# Patient Record
Sex: FEMALE | Race: WHITE | Employment: UNEMPLOYED | URBAN - METROPOLITAN AREA
[De-identification: names, ages, dates, MRNs, and addresses within clinical notes are randomized per-mention and may not be internally consistent; named-entity substitution may affect disease eponyms.]

---

## 2017-04-05 ENCOUNTER — HOSPITAL ENCOUNTER (OUTPATIENT)
Dept: PREADMISSION TESTING | Age: 82
Discharge: HOME | End: 2017-04-05
Attending: OBSTETRICS & GYNECOLOGY | Admitting: OBSTETRICS & GYNECOLOGY

## 2017-04-05 VITALS — HEIGHT: 67 IN | BODY MASS INDEX: 21.4 KG/M2 | WEIGHT: 136.38 LBS

## 2017-04-05 LAB
A/G RATIO: 1.6 (ref 1.1–2.2)
ALBUMIN SERPL-MCNC: 4.1 G/DL (ref 3.4–5)
ALP BLD-CCNC: 59 U/L (ref 40–129)
ALT SERPL-CCNC: 13 U/L (ref 10–40)
ANION GAP SERPL CALCULATED.3IONS-SCNC: 10 MMOL/L (ref 3–16)
AST SERPL-CCNC: 10 U/L (ref 15–37)
BASOPHILS ABSOLUTE: 0 K/UL (ref 0–0.2)
BASOPHILS RELATIVE PERCENT: 0.6 %
BILIRUB SERPL-MCNC: 0.3 MG/DL (ref 0–1)
BUN BLDV-MCNC: 31 MG/DL (ref 7–20)
CALCIUM SERPL-MCNC: 9.4 MG/DL (ref 8.3–10.6)
CHLORIDE BLD-SCNC: 103 MMOL/L (ref 99–110)
CO2: 27 MMOL/L (ref 21–32)
CREAT SERPL-MCNC: <0.5 MG/DL (ref 0.6–1.2)
EOSINOPHILS ABSOLUTE: 0.2 K/UL (ref 0–0.6)
EOSINOPHILS RELATIVE PERCENT: 3.1 %
GFR AFRICAN AMERICAN: >60
GFR NON-AFRICAN AMERICAN: >60
GLOBULIN: 2.6 G/DL
GLUCOSE BLD-MCNC: 94 MG/DL (ref 70–99)
HCT VFR BLD CALC: 44.3 % (ref 36–48)
HEMOGLOBIN: 14.5 G/DL (ref 12–16)
LYMPHOCYTES ABSOLUTE: 1.6 K/UL (ref 1–5.1)
LYMPHOCYTES RELATIVE PERCENT: 30.5 %
MCH RBC QN AUTO: 28.6 PG (ref 26–34)
MCHC RBC AUTO-ENTMCNC: 32.8 G/DL (ref 31–36)
MCV RBC AUTO: 87.2 FL (ref 80–100)
MONOCYTES ABSOLUTE: 0.4 K/UL (ref 0–1.3)
MONOCYTES RELATIVE PERCENT: 7.2 %
NEUTROPHILS ABSOLUTE: 3 K/UL (ref 1.7–7.7)
NEUTROPHILS RELATIVE PERCENT: 58.6 %
PDW BLD-RTO: 15.4 % (ref 12.4–15.4)
PLATELET # BLD: 149 K/UL (ref 135–450)
PMV BLD AUTO: 9.1 FL (ref 5–10.5)
POTASSIUM SERPL-SCNC: 4.7 MMOL/L (ref 3.5–5.1)
RBC # BLD: 5.07 M/UL (ref 4–5.2)
SODIUM BLD-SCNC: 140 MMOL/L (ref 136–145)
TOTAL PROTEIN: 6.7 G/DL (ref 6.4–8.2)
WBC # BLD: 5.1 K/UL (ref 4–11)

## 2017-04-05 RX ORDER — SIMVASTATIN 20 MG
20 TABLET ORAL NIGHTLY
COMMUNITY
End: 2018-02-23

## 2017-04-05 RX ORDER — SODIUM CHLORIDE, SODIUM LACTATE, POTASSIUM CHLORIDE, CALCIUM CHLORIDE 600; 310; 30; 20 MG/100ML; MG/100ML; MG/100ML; MG/100ML
INJECTION, SOLUTION INTRAVENOUS CONTINUOUS
Status: CANCELLED | OUTPATIENT
Start: 2017-04-06

## 2017-04-05 RX ORDER — TRAMADOL HYDROCHLORIDE 50 MG/1
50 TABLET ORAL EVERY 6 HOURS PRN
COMMUNITY
End: 2017-04-10 | Stop reason: CLARIF

## 2017-04-05 RX ORDER — LIDOCAINE HYDROCHLORIDE 10 MG/ML
0.5 INJECTION, SOLUTION EPIDURAL; INFILTRATION; INTRACAUDAL; PERINEURAL ONCE
Status: CANCELLED | OUTPATIENT
Start: 2017-04-06

## 2017-04-06 ENCOUNTER — HOSPITAL ENCOUNTER (OUTPATIENT)
Dept: SURGERY | Age: 82
Discharge: HOME | End: 2017-04-06
Attending: OBSTETRICS & GYNECOLOGY | Admitting: OBSTETRICS & GYNECOLOGY

## 2017-04-06 VITALS
HEIGHT: 67 IN | BODY MASS INDEX: 21.16 KG/M2 | RESPIRATION RATE: 16 BRPM | SYSTOLIC BLOOD PRESSURE: 147 MMHG | TEMPERATURE: 98.2 F | WEIGHT: 134.8 LBS | OXYGEN SATURATION: 96 % | DIASTOLIC BLOOD PRESSURE: 82 MMHG | HEART RATE: 70 BPM

## 2017-04-06 LAB
EKG ATRIAL RATE: 55 BPM
EKG DIAGNOSIS: NORMAL
EKG P AXIS: 25 DEGREES
EKG P-R INTERVAL: 204 MS
EKG Q-T INTERVAL: 438 MS
EKG QRS DURATION: 98 MS
EKG QTC CALCULATION (BAZETT): 419 MS
EKG R AXIS: 78 DEGREES
EKG T AXIS: 49 DEGREES
EKG VENTRICULAR RATE: 55 BPM
GLUCOSE BLD-MCNC: 90 MG/DL (ref 70–99)
PERFORMED ON: NORMAL
POTASSIUM SERPL-SCNC: 3.7 MMOL/L (ref 3.5–5.1)

## 2017-04-06 PROCEDURE — 93010 ELECTROCARDIOGRAM REPORT: CPT | Performed by: INTERNAL MEDICINE

## 2017-04-06 RX ORDER — LIDOCAINE HYDROCHLORIDE 10 MG/ML
1 INJECTION, SOLUTION EPIDURAL; INFILTRATION; INTRACAUDAL; PERINEURAL
Status: ACTIVE | OUTPATIENT
Start: 2017-04-06 | End: 2017-04-06

## 2017-04-06 RX ORDER — MULTIVIT-MIN/IRON/FOLIC ACID/K 18-600-40
2000 CAPSULE ORAL EVERY OTHER DAY
COMMUNITY
End: 2018-06-19

## 2017-04-06 RX ORDER — FENTANYL CITRATE 50 UG/ML
25 INJECTION, SOLUTION INTRAMUSCULAR; INTRAVENOUS EVERY 5 MIN PRN
Status: DISCONTINUED | OUTPATIENT
Start: 2017-04-06 | End: 2017-04-07 | Stop reason: HOSPADM

## 2017-04-06 RX ORDER — TRAMADOL HYDROCHLORIDE 50 MG/1
25 TABLET ORAL ONCE
Status: COMPLETED | OUTPATIENT
Start: 2017-04-06 | End: 2017-04-06

## 2017-04-06 RX ORDER — DOXYCYCLINE HYCLATE 100 MG/1
100 CAPSULE ORAL 2 TIMES DAILY
COMMUNITY
End: 2017-04-26

## 2017-04-06 RX ORDER — TRAMADOL HYDROCHLORIDE 50 MG/1
TABLET ORAL
Status: COMPLETED
Start: 2017-04-06 | End: 2017-04-06

## 2017-04-06 RX ORDER — HYDROMORPHONE HCL 110MG/55ML
0.5 PATIENT CONTROLLED ANALGESIA SYRINGE INTRAVENOUS EVERY 5 MIN PRN
Status: DISCONTINUED | OUTPATIENT
Start: 2017-04-06 | End: 2017-04-06

## 2017-04-06 RX ORDER — HYDROMORPHONE HCL 110MG/55ML
0.25 PATIENT CONTROLLED ANALGESIA SYRINGE INTRAVENOUS EVERY 5 MIN PRN
Status: DISCONTINUED | OUTPATIENT
Start: 2017-04-06 | End: 2017-04-06

## 2017-04-06 RX ORDER — GENTAMICIN SULFATE 80 MG/100ML
INJECTION, SOLUTION INTRAVENOUS
Status: COMPLETED
Start: 2017-04-06 | End: 2017-04-06

## 2017-04-06 RX ORDER — PHENAZOPYRIDINE HYDROCHLORIDE 100 MG/1
200 TABLET, FILM COATED ORAL
Status: DISCONTINUED | OUTPATIENT
Start: 2017-04-06 | End: 2017-04-07 | Stop reason: HOSPADM

## 2017-04-06 RX ORDER — MEPERIDINE HYDROCHLORIDE 25 MG/ML
12.5 INJECTION INTRAMUSCULAR; INTRAVENOUS; SUBCUTANEOUS EVERY 5 MIN PRN
Status: DISCONTINUED | OUTPATIENT
Start: 2017-04-06 | End: 2017-04-06

## 2017-04-06 RX ORDER — GENTAMICIN SULFATE 80 MG/100ML
80 INJECTION, SOLUTION INTRAVENOUS ONCE
Status: COMPLETED | OUTPATIENT
Start: 2017-04-06 | End: 2017-04-06

## 2017-04-06 RX ORDER — PHENAZOPYRIDINE HYDROCHLORIDE 100 MG/1
TABLET, FILM COATED ORAL
Status: COMPLETED
Start: 2017-04-06 | End: 2017-04-06

## 2017-04-06 RX ORDER — TRAMADOL HYDROCHLORIDE 50 MG/1
50 TABLET ORAL EVERY 6 HOURS PRN
Qty: 30 TABLET | Refills: 0 | Status: SHIPPED | OUTPATIENT
Start: 2017-04-06 | End: 2017-04-16

## 2017-04-06 RX ORDER — LIDOCAINE HYDROCHLORIDE 10 MG/ML
0.5 INJECTION, SOLUTION EPIDURAL; INFILTRATION; INTRACAUDAL; PERINEURAL ONCE
Status: DISCONTINUED | OUTPATIENT
Start: 2017-04-06 | End: 2017-04-07 | Stop reason: HOSPADM

## 2017-04-06 RX ORDER — SODIUM CHLORIDE, SODIUM LACTATE, POTASSIUM CHLORIDE, CALCIUM CHLORIDE 600; 310; 30; 20 MG/100ML; MG/100ML; MG/100ML; MG/100ML
INJECTION, SOLUTION INTRAVENOUS CONTINUOUS
Status: DISCONTINUED | OUTPATIENT
Start: 2017-04-06 | End: 2017-04-07 | Stop reason: HOSPADM

## 2017-04-06 RX ORDER — SODIUM CHLORIDE 9 MG/ML
INJECTION, SOLUTION INTRAVENOUS CONTINUOUS
Status: DISCONTINUED | OUTPATIENT
Start: 2017-04-06 | End: 2017-04-07 | Stop reason: HOSPADM

## 2017-04-06 RX ORDER — FENTANYL CITRATE 50 UG/ML
50 INJECTION, SOLUTION INTRAMUSCULAR; INTRAVENOUS EVERY 5 MIN PRN
Status: DISCONTINUED | OUTPATIENT
Start: 2017-04-06 | End: 2017-04-07 | Stop reason: HOSPADM

## 2017-04-06 RX ORDER — CLONAZEPAM 0.5 MG/1
0.25 TABLET ORAL DAILY
Status: DISCONTINUED | OUTPATIENT
Start: 2017-04-06 | End: 2017-04-07 | Stop reason: HOSPADM

## 2017-04-06 RX ORDER — ONDANSETRON 2 MG/ML
4 INJECTION INTRAMUSCULAR; INTRAVENOUS
Status: ACTIVE | OUTPATIENT
Start: 2017-04-06 | End: 2017-04-06

## 2017-04-06 RX ADMIN — CLONAZEPAM 0.25 MG: 0.5 TABLET ORAL at 10:46

## 2017-04-06 RX ADMIN — PHENAZOPYRIDINE HYDROCHLORIDE 200 MG: 100 TABLET, FILM COATED ORAL at 08:44

## 2017-04-06 RX ADMIN — SODIUM CHLORIDE, SODIUM LACTATE, POTASSIUM CHLORIDE, CALCIUM CHLORIDE: 600; 310; 30; 20 INJECTION, SOLUTION INTRAVENOUS at 11:22

## 2017-04-06 RX ADMIN — GENTAMICIN SULFATE 80 MG: 80 INJECTION, SOLUTION INTRAVENOUS at 09:12

## 2017-04-06 RX ADMIN — TRAMADOL HYDROCHLORIDE 25 MG: 50 TABLET ORAL at 13:45

## 2017-04-06 ASSESSMENT — PAIN SCALES - GENERAL
PAINLEVEL_OUTOF10: 5
PAINLEVEL_OUTOF10: 3

## 2017-04-06 ASSESSMENT — PAIN DESCRIPTION - LOCATION
LOCATION: BACK
LOCATION: ABDOMEN

## 2017-04-06 ASSESSMENT — PAIN DESCRIPTION - PAIN TYPE
TYPE: CHRONIC PAIN
TYPE: CHRONIC PAIN

## 2017-04-06 ASSESSMENT — PAIN DESCRIPTION - ORIENTATION: ORIENTATION: LOWER;LEFT

## 2017-04-10 ENCOUNTER — OFFICE VISIT (OUTPATIENT)
Dept: CARDIOLOGY CLINIC | Age: 82
End: 2017-04-10

## 2017-04-10 VITALS
WEIGHT: 136 LBS | SYSTOLIC BLOOD PRESSURE: 134 MMHG | DIASTOLIC BLOOD PRESSURE: 84 MMHG | BODY MASS INDEX: 21.3 KG/M2 | OXYGEN SATURATION: 96 % | HEART RATE: 80 BPM

## 2017-04-10 DIAGNOSIS — I49.9 IRREGULAR HEART RATE: Primary | ICD-10-CM

## 2017-04-10 DIAGNOSIS — E78.2 MIXED HYPERLIPIDEMIA: ICD-10-CM

## 2017-04-10 DIAGNOSIS — I25.10 CORONARY ARTERY DISEASE INVOLVING NATIVE CORONARY ARTERY OF NATIVE HEART WITHOUT ANGINA PECTORIS: ICD-10-CM

## 2017-04-10 PROCEDURE — 99214 OFFICE O/P EST MOD 30 MIN: CPT | Performed by: NURSE PRACTITIONER

## 2017-04-10 PROCEDURE — 93000 ELECTROCARDIOGRAM COMPLETE: CPT | Performed by: NURSE PRACTITIONER

## 2017-04-17 ENCOUNTER — TELEPHONE (OUTPATIENT)
Dept: CARDIOLOGY CLINIC | Age: 82
End: 2017-04-17

## 2017-04-20 PROCEDURE — 93224 XTRNL ECG REC UP TO 48 HRS: CPT | Performed by: INTERNAL MEDICINE

## 2017-04-21 ENCOUNTER — TELEPHONE (OUTPATIENT)
Dept: CARDIOLOGY CLINIC | Age: 82
End: 2017-04-21

## 2017-04-26 ENCOUNTER — NURSE ONLY (OUTPATIENT)
Dept: CARDIOLOGY CLINIC | Age: 82
End: 2017-04-26

## 2017-04-26 ENCOUNTER — OFFICE VISIT (OUTPATIENT)
Dept: CARDIOLOGY CLINIC | Age: 82
End: 2017-04-26

## 2017-04-26 VITALS
DIASTOLIC BLOOD PRESSURE: 64 MMHG | OXYGEN SATURATION: 95 % | BODY MASS INDEX: 21.35 KG/M2 | HEART RATE: 57 BPM | HEIGHT: 67 IN | SYSTOLIC BLOOD PRESSURE: 126 MMHG | WEIGHT: 136 LBS

## 2017-04-26 DIAGNOSIS — R00.2 PALPITATIONS: Primary | ICD-10-CM

## 2017-04-26 DIAGNOSIS — E78.2 MIXED HYPERLIPIDEMIA: ICD-10-CM

## 2017-04-26 DIAGNOSIS — I25.10 CORONARY ARTERY DISEASE INVOLVING NATIVE CORONARY ARTERY OF NATIVE HEART WITHOUT ANGINA PECTORIS: Primary | ICD-10-CM

## 2017-04-26 DIAGNOSIS — R00.2 PALPITATIONS: ICD-10-CM

## 2017-04-26 PROCEDURE — 99214 OFFICE O/P EST MOD 30 MIN: CPT | Performed by: INTERNAL MEDICINE

## 2017-04-26 RX ORDER — BISOPROLOL FUMARATE AND HYDROCHLOROTHIAZIDE 10; 6.25 MG/1; MG/1
0.5 TABLET ORAL DAILY
COMMUNITY
End: 2021-01-26

## 2017-05-30 PROCEDURE — 93268 ECG RECORD/REVIEW: CPT | Performed by: INTERNAL MEDICINE

## 2017-05-31 ENCOUNTER — OFFICE VISIT (OUTPATIENT)
Dept: CARDIOLOGY CLINIC | Age: 82
End: 2017-05-31

## 2017-05-31 VITALS
SYSTOLIC BLOOD PRESSURE: 128 MMHG | WEIGHT: 141 LBS | HEART RATE: 58 BPM | BODY MASS INDEX: 22.08 KG/M2 | DIASTOLIC BLOOD PRESSURE: 62 MMHG

## 2017-05-31 DIAGNOSIS — R00.1 BRADYCARDIA: Primary | ICD-10-CM

## 2017-05-31 DIAGNOSIS — E78.2 MIXED HYPERLIPIDEMIA: ICD-10-CM

## 2017-05-31 DIAGNOSIS — R00.2 PALPITATIONS: ICD-10-CM

## 2017-05-31 DIAGNOSIS — I25.10 CORONARY ARTERY DISEASE INVOLVING NATIVE CORONARY ARTERY OF NATIVE HEART WITHOUT ANGINA PECTORIS: ICD-10-CM

## 2017-05-31 PROCEDURE — 93000 ELECTROCARDIOGRAM COMPLETE: CPT | Performed by: INTERNAL MEDICINE

## 2017-05-31 PROCEDURE — 99203 OFFICE O/P NEW LOW 30 MIN: CPT | Performed by: INTERNAL MEDICINE

## 2018-02-19 ENCOUNTER — HOSPITAL ENCOUNTER (OUTPATIENT)
Dept: OTHER | Age: 83
Discharge: HOME | End: 2018-02-19
Attending: INTERNAL MEDICINE | Admitting: INTERNAL MEDICINE
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
## 2018-06-19 ENCOUNTER — TELEPHONE (OUTPATIENT)
Dept: CARDIOLOGY CLINIC | Age: 83
End: 2018-06-19

## 2018-06-19 ENCOUNTER — OFFICE VISIT (OUTPATIENT)
Dept: CARDIOLOGY CLINIC | Age: 83
End: 2018-06-19

## 2018-06-19 VITALS
HEIGHT: 67 IN | DIASTOLIC BLOOD PRESSURE: 68 MMHG | BODY MASS INDEX: 22.49 KG/M2 | HEART RATE: 55 BPM | SYSTOLIC BLOOD PRESSURE: 152 MMHG | WEIGHT: 143.3 LBS

## 2018-06-19 DIAGNOSIS — I25.10 CORONARY ARTERY DISEASE INVOLVING NATIVE CORONARY ARTERY OF NATIVE HEART WITHOUT ANGINA PECTORIS: ICD-10-CM

## 2018-06-19 DIAGNOSIS — R01.1 CARDIAC MURMUR: ICD-10-CM

## 2018-06-19 DIAGNOSIS — I49.9 IRREGULAR HEART RATE: Primary | ICD-10-CM

## 2018-06-19 DIAGNOSIS — E78.2 MIXED HYPERLIPIDEMIA: ICD-10-CM

## 2018-06-19 PROCEDURE — 99214 OFFICE O/P EST MOD 30 MIN: CPT | Performed by: NURSE PRACTITIONER

## 2018-06-19 RX ORDER — MULTIVIT-MIN/IRON/FOLIC ACID/K 18-600-40
160 CAPSULE ORAL DAILY
COMMUNITY
End: 2021-01-26

## 2018-06-19 RX ORDER — ACETAMINOPHEN 500 MG
500 TABLET ORAL DAILY PRN
COMMUNITY
End: 2021-01-26

## 2018-06-19 RX ORDER — TRAMADOL HYDROCHLORIDE 50 MG/1
50 TABLET ORAL EVERY 6 HOURS PRN
COMMUNITY
End: 2021-01-26

## 2018-06-19 RX ORDER — NITROGLYCERIN 0.4 MG/1
TABLET SUBLINGUAL
Qty: 25 TABLET | Refills: 3 | Status: SHIPPED | OUTPATIENT
Start: 2018-06-19 | End: 2021-01-26 | Stop reason: SDUPTHER

## 2018-06-19 RX ORDER — EZETIMIBE AND SIMVASTATIN 10; 20 MG/1; MG/1
1 TABLET ORAL NIGHTLY
COMMUNITY
End: 2021-01-26

## 2018-06-19 RX ORDER — NITROGLYCERIN 80 MG/1
1 PATCH TRANSDERMAL PRN
Qty: 30 PATCH | Refills: 1 | Status: SHIPPED | OUTPATIENT
Start: 2018-06-19 | End: 2021-01-26

## 2018-06-19 RX ORDER — MULTIVITAMIN/IRON/FOLIC ACID 18MG-0.4MG
400 TABLET ORAL DAILY
COMMUNITY

## 2018-06-19 RX ORDER — NITROGLYCERIN 80 MG/1
1 PATCH TRANSDERMAL PRN
COMMUNITY
End: 2018-06-19 | Stop reason: SDUPTHER

## 2021-01-26 ENCOUNTER — OFFICE VISIT (OUTPATIENT)
Dept: FAMILY MEDICINE CLINIC | Age: 86
End: 2021-01-26
Payer: MEDICARE

## 2021-01-26 VITALS
BODY MASS INDEX: 20.83 KG/M2 | SYSTOLIC BLOOD PRESSURE: 150 MMHG | TEMPERATURE: 97.1 F | DIASTOLIC BLOOD PRESSURE: 100 MMHG | WEIGHT: 133 LBS

## 2021-01-26 DIAGNOSIS — I10 ESSENTIAL HYPERTENSION: ICD-10-CM

## 2021-01-26 DIAGNOSIS — L97.929 ULCER OF LEFT LOWER EXTREMITY, UNSPECIFIED ULCER STAGE (HCC): ICD-10-CM

## 2021-01-26 DIAGNOSIS — I25.10 CORONARY ARTERY DISEASE INVOLVING NATIVE CORONARY ARTERY OF NATIVE HEART WITHOUT ANGINA PECTORIS: ICD-10-CM

## 2021-01-26 DIAGNOSIS — E78.2 MIXED HYPERLIPIDEMIA: Primary | ICD-10-CM

## 2021-01-26 DIAGNOSIS — N39.3 STRESS INCONTINENCE: ICD-10-CM

## 2021-01-26 PROCEDURE — G8427 DOCREV CUR MEDS BY ELIG CLIN: HCPCS | Performed by: FAMILY MEDICINE

## 2021-01-26 PROCEDURE — 1090F PRES/ABSN URINE INCON ASSESS: CPT | Performed by: FAMILY MEDICINE

## 2021-01-26 PROCEDURE — 4040F PNEUMOC VAC/ADMIN/RCVD: CPT | Performed by: FAMILY MEDICINE

## 2021-01-26 PROCEDURE — 1123F ACP DISCUSS/DSCN MKR DOCD: CPT | Performed by: FAMILY MEDICINE

## 2021-01-26 PROCEDURE — G8399 PT W/DXA RESULTS DOCUMENT: HCPCS | Performed by: FAMILY MEDICINE

## 2021-01-26 PROCEDURE — 1036F TOBACCO NON-USER: CPT | Performed by: FAMILY MEDICINE

## 2021-01-26 PROCEDURE — 99204 OFFICE O/P NEW MOD 45 MIN: CPT | Performed by: FAMILY MEDICINE

## 2021-01-26 PROCEDURE — G8420 CALC BMI NORM PARAMETERS: HCPCS | Performed by: FAMILY MEDICINE

## 2021-01-26 PROCEDURE — G8484 FLU IMMUNIZE NO ADMIN: HCPCS | Performed by: FAMILY MEDICINE

## 2021-01-26 RX ORDER — NITROGLYCERIN 0.4 MG/1
TABLET SUBLINGUAL
Qty: 25 TABLET | Refills: 3 | Status: SHIPPED | OUTPATIENT
Start: 2021-01-26 | End: 2021-10-19

## 2021-01-26 RX ORDER — NIACINAMIDE 500 MG
500 TABLET ORAL PRN
COMMUNITY
End: 2021-11-19

## 2021-01-26 RX ORDER — MULTIVITAMIN WITH IRON
100 TABLET ORAL DAILY
COMMUNITY
End: 2021-10-19

## 2021-01-26 RX ORDER — ASPIRIN 325 MG
325 TABLET ORAL DAILY
COMMUNITY
End: 2021-10-19

## 2021-01-26 RX ORDER — HYDROCHLOROTHIAZIDE 12.5 MG/1
12.5 CAPSULE, GELATIN COATED ORAL EVERY MORNING
Qty: 90 CAPSULE | Refills: 1 | Status: SHIPPED | OUTPATIENT
Start: 2021-01-26 | End: 2021-10-19

## 2021-01-26 RX ORDER — AMLODIPINE BESYLATE 5 MG/1
5 TABLET ORAL DAILY
Qty: 30 TABLET | Refills: 3 | Status: SHIPPED | OUTPATIENT
Start: 2021-01-26 | End: 2021-01-26

## 2021-01-26 RX ORDER — AMLODIPINE BESYLATE 5 MG/1
5 TABLET ORAL DAILY
Qty: 90 TABLET | Refills: 1 | Status: SHIPPED | OUTPATIENT
Start: 2021-01-26 | End: 2021-03-26

## 2021-01-26 RX ORDER — VITAMIN E 268 MG
400 CAPSULE ORAL WEEKLY
COMMUNITY
End: 2021-10-19

## 2021-01-26 RX ORDER — LANOLIN ALCOHOL/MO/W.PET/CERES
500 CREAM (GRAM) TOPICAL NIGHTLY
COMMUNITY
End: 2021-10-19

## 2021-01-26 ASSESSMENT — ENCOUNTER SYMPTOMS
CONSTIPATION: 1
DIARRHEA: 0
SHORTNESS OF BREATH: 0
WHEEZING: 0
CHEST TIGHTNESS: 0
RHINORRHEA: 0

## 2021-01-26 NOTE — PROGRESS NOTES
SUBJECTIVE:    Kira Flores is a 81 y.o. female who presents as a new patient. Chief Complaint   Patient presents with   [address]     Patient is here to establish care. Previous Dr. Ronald Trent patient. Coronary Artery Disease  Presents for follow-up visit. Symptoms include leg swelling. Pertinent negatives include no chest pain, chest tightness, dizziness, palpitations or shortness of breath. Risk factors include hyperlipidemia. The symptoms have been stable. Compliance with diet is good. Compliance with exercise is poor. Compliance with medications is poor. Hyperlipidemia  This is a chronic problem. The current episode started more than 1 year ago. The problem is controlled. Pertinent negatives include no chest pain or shortness of breath. She is currently on no antihyperlipidemic treatment. The current treatment provides significant improvement of lipids. Compliance problems include adherence to exercise, adherence to diet and medication side effects. Risk factors for coronary artery disease include post-menopausal, a sedentary lifestyle, hypertension, diabetes mellitus, dyslipidemia and family history. Hypertension  This is a chronic problem. The current episode started more than 1 year ago. Pertinent negatives include no chest pain, palpitations or shortness of breath. Risk factors for coronary artery disease include dyslipidemia, sedentary lifestyle and post-menopausal state. Patient had been on Ziac 10/6.25 for many years. This was stopped and by her history was because of low pulse. She was tried on Norvasc 10 but does not remember why it was stopped. Most recently she was placed on Lopressor and she stated it caused a rash so it was discontinued. Today she is taking nothing for her blood pressure and it is markedly elevated in the office. Stress urinary incontinence  This is a chronic problem.   She has been followed by urology in the past and would like a new urologist.  She states that symptoms are getting worse. I am not sure what she is tried in the past but is on no medicine at this time for this problem. Pressure ulcer left lower extremity  This is a chronic problem. She states that a scab keeps forming. This morning she knocked the scab off while washing foot and it started bleeding.     Past Medical History:   Diagnosis Date    Anemia     Angina at rest [facility])     Anxiety     Back pain     CAD (coronary artery disease)     Clostridium difficile diarrhea 2016    Heart attack ([facility] [address]     History of uterine suspension procedure     Hyperlipidemia     Hypertension     Pain     back    Urinary incontinence      Past Surgical History:   Procedure Laterality Date    APPENDECTOMY      BLADDER SURGERY      COLONOSCOPY      CYSTOCELE REPAIR      CYSTOSCOPY      HYSTERECTOMY, VAGINAL  2016    vagianl hysterectomy, right salpingoophorectomy, anterior and posterior repair, paravaginal repair, uterosacral suspension, cystoscopy, transvaginal tape sling insertion    OTHER SURGICAL HISTORY  2017    SLING REMOVAL/RELEASE, AND [employer] FIT TRANSVAGINAL    RECTOCELE REPAIR      TONSILLECTOMY      UTERINE SUSPENSION       Family History   Problem Relation Age of Onset    Arthritis Mother     Heart Disease Mother     Hearing Loss Mother     Arthritis Father     Cancer Father     Hearing Loss Father     High Cholesterol Father     Dementia Father     Stroke Maternal Grandmother      Social History     Tobacco Use    Smoking status: Former Smoker     Packs/day: 1.00     Years: 15.00     Pack years: 15.00     Types: Cigarettes     Quit date: 2006     Years since quittin.3    Smokeless tobacco: Never Used   Substance Use Topics    Alcohol use: No      Allergies   Allergen Reactions    Atorvastatin      Joint aches    Captopril      \"Any of the \"pril\" medicines I'm allergic to\"    Ciprofloxacin      Lip swelling    Codeine Other (See Comments)     Respiratory distress; \"forget how to breath, end up in ER\"    Detrol [Tolterodine Tartrate]      \"just makes me feel awful and sick feeling\"    Hydrocodone      Respiratory distress, \"forget how to breath, end up in ER\"    Ibuprofen      I get a purplish rash     Oxycodone      Not effective    Statins      lipitor caused muscle pain    Sulfa Antibiotics      I will have to go to the emergency room for itching    Tolectin [Tolmetin]      \"this also sent me to the emergency room; I do not remember why, maybe swelling and itching from head to toe. \"    Zestril [Lisinopril]      Swelling in the mouth    Metoprolol Rash     Current Outpatient Medications on File Prior to Visit   Medication Sig Dispense Refill    aspirin 325 MG tablet Take 325 mg by mouth daily      vitamin B-6 (PYRIDOXINE) 100 MG tablet Take 100 mg by mouth daily      niacin 500 MG extended release capsule Take 500 mg by mouth nightly      vitamin E 400 UNIT capsule Take 400 Units by mouth daily      niacinamide 500 MG tablet Take 500 mg by mouth as needed      Magnesium Oxide 250 MG TABS Take 250 mg by mouth daily      Coenzyme Q10 (CO Q 10 PO) Take 300 mg by mouth daily      nitroGLYCERIN (NITROSTAT) 0.4 MG SL tablet Dissolve 1 tablet under tongue for chest pain and repeat every 5 min up to max of 3 total doses. If no relief after 3 doses call 911 25 tablet 3     No current facility-administered medications on file prior to visit. Review of Systems   Constitutional: Negative for activity change, fatigue and unexpected weight change. HENT: Negative for congestion, postnasal drip and rhinorrhea. Respiratory: Negative for chest tightness, shortness of breath and wheezing. Cardiovascular: Positive for leg swelling. Negative for chest pain and palpitations. Gastrointestinal: Positive for constipation ( taking psylium). Negative for diarrhea. Genitourinary: Positive for difficulty urinating.    Neurological:

## 2021-03-03 ENCOUNTER — OFFICE VISIT (OUTPATIENT)
Dept: FAMILY MEDICINE CLINIC | Age: 86
End: 2021-03-03
Payer: MEDICARE

## 2021-03-03 VITALS
BODY MASS INDEX: 18.64 KG/M2 | SYSTOLIC BLOOD PRESSURE: 130 MMHG | TEMPERATURE: 97.5 F | DIASTOLIC BLOOD PRESSURE: 86 MMHG | WEIGHT: 119 LBS

## 2021-03-03 DIAGNOSIS — I10 ESSENTIAL HYPERTENSION: ICD-10-CM

## 2021-03-03 DIAGNOSIS — E78.2 MIXED HYPERLIPIDEMIA: ICD-10-CM

## 2021-03-03 DIAGNOSIS — B35.1 ONYCHOMYCOSIS: ICD-10-CM

## 2021-03-03 DIAGNOSIS — I25.10 CORONARY ARTERY DISEASE INVOLVING NATIVE CORONARY ARTERY OF NATIVE HEART WITHOUT ANGINA PECTORIS: Primary | ICD-10-CM

## 2021-03-03 PROCEDURE — 1090F PRES/ABSN URINE INCON ASSESS: CPT | Performed by: FAMILY MEDICINE

## 2021-03-03 PROCEDURE — 4040F PNEUMOC VAC/ADMIN/RCVD: CPT | Performed by: FAMILY MEDICINE

## 2021-03-03 PROCEDURE — 1123F ACP DISCUSS/DSCN MKR DOCD: CPT | Performed by: FAMILY MEDICINE

## 2021-03-03 PROCEDURE — G8420 CALC BMI NORM PARAMETERS: HCPCS | Performed by: FAMILY MEDICINE

## 2021-03-03 PROCEDURE — G8427 DOCREV CUR MEDS BY ELIG CLIN: HCPCS | Performed by: FAMILY MEDICINE

## 2021-03-03 PROCEDURE — 99214 OFFICE O/P EST MOD 30 MIN: CPT | Performed by: FAMILY MEDICINE

## 2021-03-03 PROCEDURE — 1036F TOBACCO NON-USER: CPT | Performed by: FAMILY MEDICINE

## 2021-03-03 PROCEDURE — G8484 FLU IMMUNIZE NO ADMIN: HCPCS | Performed by: FAMILY MEDICINE

## 2021-03-03 PROCEDURE — G8399 PT W/DXA RESULTS DOCUMENT: HCPCS | Performed by: FAMILY MEDICINE

## 2021-03-03 RX ORDER — OMEGA-3S/DHA/EPA/FISH OIL/D3 300MG-1000
400 CAPSULE ORAL WEEKLY
COMMUNITY
End: 2021-10-19

## 2021-03-03 ASSESSMENT — ENCOUNTER SYMPTOMS
SHORTNESS OF BREATH: 0
CONSTIPATION: 0
CHEST TIGHTNESS: 0
BACK PAIN: 1
DIARRHEA: 0

## 2021-03-03 NOTE — PROGRESS NOTES
reviewed and updated as appropriate.      Past Medical History:   Diagnosis Date    Anemia     Angina at rest [facility])     Anxiety     Back pain     CAD (coronary artery disease)     Clostridium difficile diarrhea 2016    Heart attack ([facility] [address]     History of uterine suspension procedure     Hyperlipidemia     Hypertension     Pain     back    Urinary incontinence      Past Surgical History:   Procedure Laterality Date    APPENDECTOMY      BLADDER SURGERY      COLONOSCOPY      CYSTOCELE REPAIR      CYSTOSCOPY      HYSTERECTOMY, VAGINAL  2016    vagianl hysterectomy, right salpingoophorectomy, anterior and posterior repair, paravaginal repair, uterosacral suspension, cystoscopy, transvaginal tape sling insertion    OTHER SURGICAL HISTORY  2017    SLING REMOVAL/RELEASE, AND [employer] FIT TRANSVAGINAL    RECTOCELE REPAIR      TONSILLECTOMY      UTERINE SUSPENSION       Family History   Problem Relation Age of Onset    Arthritis Mother     Heart Disease Mother     Hearing Loss Mother     Arthritis Father     Cancer Father     Hearing Loss Father     High Cholesterol Father     Dementia Father     Stroke Maternal Grandmother      Social History     Tobacco Use    Smoking status: Former Smoker     Packs/day: 1.00     Years: 15.00     Pack years: 15.00     Types: Cigarettes     Quit date: 2006     Years since quittin.4    Smokeless tobacco: Never Used   Substance Use Topics    Alcohol use: No      Allergies   Allergen Reactions    Atorvastatin      Joint aches    Captopril      \"Any of the \"pril\" medicines I'm allergic to\"    Ciprofloxacin      Lip swelling    Codeine Other (See Comments)     Respiratory distress; \"forget how to breath, end up in ER\"    Detrol [Tolterodine Tartrate]      \"just makes me feel awful and sick feeling\"    Hydrocodone      Respiratory distress, \"forget how to breath, end up in ER\"    Ibuprofen      I get a purplish rash     Oxycodone      Not effective    Statins      lipitor caused muscle pain    Sulfa Antibiotics      I will have to go to the emergency room for itching    Tolectin [Tolmetin]      \"this also sent me to the emergency room; I do not remember why, maybe swelling and itching from head to toe. \"    Zestril [Lisinopril]      Swelling in the mouth    Metoprolol Rash     Current Outpatient Medications on File Prior to Visit   Medication Sig Dispense Refill    psyllium 0.52 g capsule Take 0.52 g by mouth daily      vitamin D3 (CHOLECALCIFEROL) 10 MCG (400 UNIT) TABS tablet Take 400 Units by mouth once a week      aspirin 325 MG tablet Take 325 mg by mouth daily      vitamin B-6 (PYRIDOXINE) 100 MG tablet Take 100 mg by mouth daily      niacin 500 MG extended release capsule Take 500 mg by mouth nightly      vitamin E 400 UNIT capsule Take 400 Units by mouth once a week       niacinamide 500 MG tablet Take 500 mg by mouth as needed      hydroCHLOROthiazide (MICROZIDE) 12.5 MG capsule Take 1 capsule by mouth every morning 90 capsule 1    amLODIPine (NORVASC) 5 MG tablet TAKE 1 TABLET BY MOUTH DAILY 90 tablet 1    nitroGLYCERIN (NITROSTAT) 0.4 MG SL tablet Dissolve 1 tablet under tongue for chest pain and repeat every 5 min up to max of 3 total doses. If no relief after 3 doses call 911 25 tablet 3    Magnesium Oxide 250 MG TABS Take 400 mg by mouth daily       Coenzyme Q10 (CO Q 10 PO) Take 300 mg by mouth daily       No current facility-administered medications on file prior to visit. Review of Systems   Constitutional: Positive for appetite change ( decreased). Negative for activity change. HENT: Positive for hearing loss. Respiratory: Negative for chest tightness and shortness of breath. Cardiovascular: Negative for chest pain. Gastrointestinal: Negative for constipation ( taking psyillium) and diarrhea. Genitourinary: Positive for difficulty urinating.    Musculoskeletal: Positive for back pain.   Neurological: Positive for dizziness. Psychiatric/Behavioral: Positive for dysphoric mood ( recent death of friends) and sleep disturbance (MNA with urinating at night). The patient is not nervous/anxious. OBJECTIVE:    /86   Temp 97.5 °F (36.4 °C)   Wt 119 lb (54 kg) Comment: pt reported- refused to weigh  BMI 18.64 kg/m²    Physical Exam  Constitutional:       Appearance: She is well-developed. HENT:      Head: Normocephalic and atraumatic. Right Ear: Tympanic membrane and external ear normal.      Left Ear: Tympanic membrane and external ear normal.      Nose: Nose normal.      Mouth/Throat:      Mouth: Mucous membranes are moist.      Pharynx: No posterior oropharyngeal erythema. Eyes:      General:         Right eye: No discharge. Conjunctiva/sclera: Conjunctivae normal.   Neck:      Musculoskeletal: Normal range of motion and neck supple. Thyroid: No thyromegaly. Vascular: No JVD. Trachea: No tracheal deviation. Cardiovascular:      Rate and Rhythm: Normal rate and regular rhythm. Heart sounds: Normal heart sounds. Pulmonary:      Effort: Pulmonary effort is normal. No respiratory distress. Breath sounds: Normal breath sounds. No rales. Lymphadenopathy:      Cervical: No cervical adenopathy. Skin:     General: Skin is warm and dry. Neurological:      Mental Status: She is alert and oriented to person, place, and time. ASSESSMENT/PLAN:    Reva Jackson was seen today for follow-up. Diagnoses and all orders for this visit:    Coronary artery disease involving native coronary artery of native heart without angina pectoris    Mixed hyperlipidemia  Patient does not tolerate statins. She has been taking niacin and multiple other supplements. Essential hypertension  Stable on current meds    Onychomycosis  -     Amb External Referral To Podiatry        Return in about 3 months (around 6/3/2021).     Please note portions of this note were completed with a voicerecognition program.  Efforts were made to edit the dictations but occasionally words are mis-transcribed.

## 2021-03-26 DIAGNOSIS — I10 ESSENTIAL HYPERTENSION: ICD-10-CM

## 2021-03-26 RX ORDER — AMLODIPINE BESYLATE 5 MG/1
5 TABLET ORAL DAILY
Qty: 90 TABLET | Refills: 1 | Status: SHIPPED | OUTPATIENT
Start: 2021-03-26 | End: 2021-05-25

## 2021-05-24 DIAGNOSIS — I10 ESSENTIAL HYPERTENSION: ICD-10-CM

## 2021-05-25 RX ORDER — AMLODIPINE BESYLATE 5 MG/1
5 TABLET ORAL DAILY
Qty: 30 TABLET | Refills: 5 | Status: SHIPPED | OUTPATIENT
Start: 2021-05-25 | End: 2021-10-15 | Stop reason: SDUPTHER

## 2021-10-15 DIAGNOSIS — I10 ESSENTIAL HYPERTENSION: ICD-10-CM

## 2021-10-15 RX ORDER — AMLODIPINE BESYLATE 5 MG/1
5 TABLET ORAL DAILY
Qty: 30 TABLET | Refills: 5 | Status: SHIPPED | OUTPATIENT
Start: 2021-10-15 | End: 2022-04-05

## 2021-10-15 NOTE — TELEPHONE ENCOUNTER
amLODIPine (NORVASC) 5 MG tablet [[id number]]    [id number], [place] [address]   Phone:  [phone]  Fax:  [phone]

## 2021-10-18 ASSESSMENT — ENCOUNTER SYMPTOMS: BACK PAIN: 1

## 2021-10-18 NOTE — PROGRESS NOTES
Tobacco Use    Smoking status: Former Smoker     Packs/day: 1.00     Years: 15.00     Pack years: 15.00     Types: Cigarettes     Quit date: 9/19/2006     Years since quitting: 15.0    Smokeless tobacco: Never Used   Substance Use Topics    Alcohol use: No      Allergies   Allergen Reactions    Atorvastatin      Joint aches    Captopril      \"Any of the \"pril\" medicines I'm allergic to\"    Ciprofloxacin      Lip swelling    Codeine Other (See Comments)     Respiratory distress; \"forget how to breath, end up in ER\"    Detrol [Tolterodine Tartrate]      \"just makes me feel awful and sick feeling\"    Hydrocodone      Respiratory distress, \"forget how to breath, end up in ER\"    Ibuprofen      I get a purplish rash     Oxycodone      Not effective    Statins      lipitor caused muscle pain    Sulfa Antibiotics      I will have to go to the emergency room for itching    Tolectin [Tolmetin]      \"this also sent me to the emergency room; I do not remember why, maybe swelling and itching from head to toe. \"    Zestril [Lisinopril]      Swelling in the mouth    Metoprolol Rash     Current Outpatient Medications on File Prior to Visit   Medication Sig Dispense Refill    amLODIPine (NORVASC) 5 MG tablet Take 1 tablet by mouth daily 30 tablet 5    niacinamide 500 MG tablet Take 500 mg by mouth as needed      Magnesium Oxide 250 MG TABS Take 400 mg by mouth daily       Coenzyme Q10 (CO Q 10 PO) Take 300 mg by mouth daily       No current facility-administered medications on file prior to visit. Review of Systems   Constitutional: Positive for weight loss. Cardiovascular: Negative for chest pain and palpitations ( resolved after stopping medications). Gastrointestinal: Positive for abdominal pain. Musculoskeletal: Positive for back pain.        OBJECTIVE:    /60   Temp 98 °F (36.7 °C)   Ht 5' 7\" (1.702 m)   Wt 111 lb (50.3 kg)   BMI 17.39 kg/m²    Physical Exam  Constitutional: General: She is not in acute distress. Appearance: She is cachectic. HENT:      Head: Normocephalic and atraumatic. Right Ear: Tympanic membrane normal.      Left Ear: Tympanic membrane normal.      Nose: Nose normal.      Mouth/Throat:      Mouth: Mucous membranes are moist.      Pharynx: No posterior oropharyngeal erythema. Cardiovascular:      Rate and Rhythm: Normal rate and regular rhythm. Pulses: Normal pulses. Heart sounds: Normal heart sounds. Pulmonary:      Effort: Pulmonary effort is normal.      Breath sounds: Normal breath sounds. Abdominal:      General: Abdomen is flat. Palpations: Abdomen is soft. There is no mass. Tenderness: There is no abdominal tenderness. There is no guarding. Musculoskeletal:      Right lower leg: No edema. Left lower leg: No edema. Neurological:      General: No focal deficit present. Mental Status: She is alert and oriented to person, place, and time. Psychiatric:         Mood and Affect: Mood normal.         Behavior: Behavior normal. Behavior is cooperative. ASSESSMENT/PLAN:    Marin Pantoja was seen today for follow-up. Diagnoses and all orders for this visit:    Left upper quadrant abdominal pain  -     CT ABDOMEN PELVIS W IV CONTRAST Additional Contrast? Radiologist Recommendation; Future  -     Comprehensive Metabolic Panel, Fasting; Future  -     CBC Auto Differential; Future  -     Lipase; Future    Weight loss  -     TSH with Reflex; Future        Return in about 4 weeks (around 11/16/2021). Please note portions of this note were completed with a voicerecognition program.  Efforts were made to edit the dictations but occasionally words are mis-transcribed.

## 2021-10-19 ENCOUNTER — OFFICE VISIT (OUTPATIENT)
Dept: FAMILY MEDICINE CLINIC | Age: 86
End: 2021-10-19
Payer: MEDICARE

## 2021-10-19 VITALS
BODY MASS INDEX: 17.42 KG/M2 | TEMPERATURE: 98 F | WEIGHT: 111 LBS | DIASTOLIC BLOOD PRESSURE: 60 MMHG | SYSTOLIC BLOOD PRESSURE: 138 MMHG | HEIGHT: 67 IN

## 2021-10-19 DIAGNOSIS — R10.12 LEFT UPPER QUADRANT ABDOMINAL PAIN: Primary | ICD-10-CM

## 2021-10-19 DIAGNOSIS — R63.4 WEIGHT LOSS: ICD-10-CM

## 2021-10-19 PROCEDURE — G8484 FLU IMMUNIZE NO ADMIN: HCPCS | Performed by: FAMILY MEDICINE

## 2021-10-19 PROCEDURE — G8399 PT W/DXA RESULTS DOCUMENT: HCPCS | Performed by: FAMILY MEDICINE

## 2021-10-19 PROCEDURE — 1036F TOBACCO NON-USER: CPT | Performed by: FAMILY MEDICINE

## 2021-10-19 PROCEDURE — 99214 OFFICE O/P EST MOD 30 MIN: CPT | Performed by: FAMILY MEDICINE

## 2021-10-19 PROCEDURE — G8419 CALC BMI OUT NRM PARAM NOF/U: HCPCS | Performed by: FAMILY MEDICINE

## 2021-10-19 PROCEDURE — G8427 DOCREV CUR MEDS BY ELIG CLIN: HCPCS | Performed by: FAMILY MEDICINE

## 2021-10-19 PROCEDURE — 1090F PRES/ABSN URINE INCON ASSESS: CPT | Performed by: FAMILY MEDICINE

## 2021-10-19 PROCEDURE — 1123F ACP DISCUSS/DSCN MKR DOCD: CPT | Performed by: FAMILY MEDICINE

## 2021-10-19 PROCEDURE — 4040F PNEUMOC VAC/ADMIN/RCVD: CPT | Performed by: FAMILY MEDICINE

## 2021-10-19 ASSESSMENT — ENCOUNTER SYMPTOMS: ABDOMINAL PAIN: 1

## 2021-11-19 ENCOUNTER — OFFICE VISIT (OUTPATIENT)
Dept: FAMILY MEDICINE CLINIC | Age: 86
End: 2021-11-19
Payer: MEDICARE

## 2021-11-19 VITALS
SYSTOLIC BLOOD PRESSURE: 120 MMHG | BODY MASS INDEX: 18.32 KG/M2 | WEIGHT: 117 LBS | TEMPERATURE: 97.3 F | DIASTOLIC BLOOD PRESSURE: 90 MMHG

## 2021-11-19 DIAGNOSIS — E55.9 VITAMIN D DEFICIENCY: ICD-10-CM

## 2021-11-19 DIAGNOSIS — R25.2 MUSCLE CRAMPS: Primary | ICD-10-CM

## 2021-11-19 DIAGNOSIS — E78.2 MIXED HYPERLIPIDEMIA: ICD-10-CM

## 2021-11-19 PROCEDURE — G8419 CALC BMI OUT NRM PARAM NOF/U: HCPCS | Performed by: FAMILY MEDICINE

## 2021-11-19 PROCEDURE — 1036F TOBACCO NON-USER: CPT | Performed by: FAMILY MEDICINE

## 2021-11-19 PROCEDURE — G8484 FLU IMMUNIZE NO ADMIN: HCPCS | Performed by: FAMILY MEDICINE

## 2021-11-19 PROCEDURE — 4040F PNEUMOC VAC/ADMIN/RCVD: CPT | Performed by: FAMILY MEDICINE

## 2021-11-19 PROCEDURE — 1123F ACP DISCUSS/DSCN MKR DOCD: CPT | Performed by: FAMILY MEDICINE

## 2021-11-19 PROCEDURE — G8427 DOCREV CUR MEDS BY ELIG CLIN: HCPCS | Performed by: FAMILY MEDICINE

## 2021-11-19 PROCEDURE — G8399 PT W/DXA RESULTS DOCUMENT: HCPCS | Performed by: FAMILY MEDICINE

## 2021-11-19 PROCEDURE — 99213 OFFICE O/P EST LOW 20 MIN: CPT | Performed by: FAMILY MEDICINE

## 2021-11-19 PROCEDURE — 1090F PRES/ABSN URINE INCON ASSESS: CPT | Performed by: FAMILY MEDICINE

## 2021-11-19 ASSESSMENT — ENCOUNTER SYMPTOMS
RHINORRHEA: 0
NAUSEA: 0
SHORTNESS OF BREATH: 0
ABDOMINAL PAIN: 1
DIARRHEA: 0
VOMITING: 0
CONSTIPATION: 1

## 2021-11-19 NOTE — PROGRESS NOTES
SUBJECTIVE:    Jrery Winkler is a 81 y.o. female who presents for a follow up visit. Chief Complaint   Patient presents with    Follow-up     Patient is here for a follow up. Discuss lab. Abdominal Pain  This is a chronic problem. The current episode started more than 1 month ago. The problem occurs daily. The problem has been gradually improving. The pain is located in the LUQ. The pain is mild. The quality of the pain is cramping. Associated symptoms include constipation. Pertinent negatives include no diarrhea, nausea or vomiting. The pain is aggravated by movement. The pain is relieved by nothing. She has tried nothing for the symptoms. Patient's medications, allergies, past medical,surgical, social and family histories were reviewed and updated as appropriate.      Past Medical History:   Diagnosis Date    Anemia     [facility] rest [facility])     Anxiety     Back pain     CAD (coronary artery disease)     Clostridium difficile diarrhea 09/2016    Heart attack ([facility] [address]     History of uterine suspension procedure     Hyperlipidemia     Hypertension     Pain     back    Urinary incontinence      Past Surgical History:   Procedure Laterality Date    APPENDECTOMY      BLADDER SURGERY      COLONOSCOPY      CYSTOCELE REPAIR      CYSTOSCOPY      HYSTERECTOMY, VAGINAL  11/17/2016    vagianl hysterectomy, right salpingoophorectomy, anterior and posterior repair, paravaginal repair, uterosacral suspension, cystoscopy, transvaginal tape sling insertion    OTHER SURGICAL HISTORY  04/06/2017    SLING REMOVAL/RELEASE, AND [employer] FIT TRANSVAGINAL    RECTOCELE REPAIR      TONSILLECTOMY      UTERINE SUSPENSION      WRIST FRACTURE SURGERY Left      Family History   Problem Relation Age of Onset    Arthritis Mother     Heart Disease Mother     Hearing Loss Mother     Arthritis Father     Cancer Father     Hearing Loss Father     High Cholesterol Father     Dementia Father    [employer] Stroke Maternal Grandmother      Social History     Tobacco Use    Smoking status: Former Smoker     Packs/day: 1.00     Years: 15.00     Pack years: 15.00     Types: Cigarettes     Quit date: 9/19/2006     Years since quitting: 15.1    Smokeless tobacco: Never Used   Substance Use Topics    Alcohol use: No      Allergies   Allergen Reactions    Atorvastatin      Joint aches    Captopril      \"Any of the \"pril\" medicines I'm allergic to\"    Ciprofloxacin      Lip swelling    Codeine Other (See Comments)     Respiratory distress; \"forget how to breath, end up in ER\"    Detrol [Tolterodine Tartrate]      \"just makes me feel awful and sick feeling\"    Hydrocodone      Respiratory distress, \"forget how to breath, end up in ER\"    Ibuprofen      I get a purplish rash     Oxycodone      Not effective    Statins      lipitor caused muscle pain    Sulfa Antibiotics      I will have to go to the emergency room for itching    Tolectin [Tolmetin]      \"this also sent me to the emergency room; I do not remember why, maybe swelling and itching from head to toe. \"    Zestril [Lisinopril]      Swelling in the mouth    Metoprolol Rash     Current Outpatient Medications on File Prior to Visit   Medication Sig Dispense Refill    amLODIPine (NORVASC) 5 MG tablet Take 1 tablet by mouth daily 30 tablet 5    Magnesium Oxide 250 MG TABS Take 400 mg by mouth daily        No current facility-administered medications on file prior to visit. Review of Systems   Constitutional: Positive for fatigue. Negative for activity change. HENT: Negative for postnasal drip and rhinorrhea. Respiratory: Negative for shortness of breath. Cardiovascular: Negative for chest pain. Gastrointestinal: Positive for abdominal pain and constipation. Negative for diarrhea, nausea and vomiting. Psychiatric/Behavioral: Negative for sleep disturbance.        OBJECTIVE:    BP (!) 120/90   Temp 97.3 °F (36.3 °C)   Wt 117 lb (53.1 kg) BMI 18.32 kg/m²    Physical Exam  Constitutional:       General: She is not in acute distress. Appearance: She is underweight. HENT:      Head: Normocephalic and atraumatic. Right Ear: Tympanic membrane normal.      Left Ear: Tympanic membrane normal.      Nose: Nose normal. No rhinorrhea. Mouth/Throat:      Mouth: Mucous membranes are moist.      Pharynx: No posterior oropharyngeal erythema. Cardiovascular:      Rate and Rhythm: Normal rate and regular rhythm. Pulmonary:      Effort: Pulmonary effort is normal.      Breath sounds: Normal breath sounds. Musculoskeletal:      Cervical back: Normal range of motion and neck supple. No rigidity. Right lower leg: No edema. Left lower leg: No edema. Lymphadenopathy:      Cervical: No cervical adenopathy. Neurological:      General: No focal deficit present. Mental Status: She is alert and oriented to person, place, and time. Psychiatric:         Mood and Affect: Mood normal.         Behavior: Behavior normal.         ASSESSMENT/PLAN:    Galina Mcrae was seen today for follow-up. Diagnoses and all orders for this visit:    Muscle cramps  -     MAGNESIUM; Future    Vitamin D deficiency  -     VITAMIN D 25 HYDROXY; Future    Mixed hyperlipidemia  -     Comprehensive Metabolic Panel, Fasting; Future  -     Lipid, Fasting; Future        Return in about 4 months (around 3/19/2022). Please note portions of this note were completed with a voicerecognition program.  Efforts were made to edit the dictations but occasionally words are mis-transcribed.

## 2022-04-05 ENCOUNTER — OFFICE VISIT (OUTPATIENT)
Dept: FAMILY MEDICINE CLINIC | Age: 87
End: 2022-04-05
Payer: MEDICARE

## 2022-04-05 VITALS
DIASTOLIC BLOOD PRESSURE: 82 MMHG | BODY MASS INDEX: 19.2 KG/M2 | OXYGEN SATURATION: 94 % | TEMPERATURE: 97.5 F | WEIGHT: 119.5 LBS | SYSTOLIC BLOOD PRESSURE: 182 MMHG | HEART RATE: 92 BPM | HEIGHT: 66 IN

## 2022-04-05 DIAGNOSIS — E78.2 MIXED HYPERLIPIDEMIA: ICD-10-CM

## 2022-04-05 DIAGNOSIS — R41.3 MEMORY IMPAIRMENT: Primary | ICD-10-CM

## 2022-04-05 DIAGNOSIS — I10 ESSENTIAL HYPERTENSION: ICD-10-CM

## 2022-04-05 LAB
A/G RATIO: 1.7 (ref 1.1–2.2)
ALBUMIN SERPL-MCNC: 4.3 G/DL (ref 3.4–5)
ALP BLD-CCNC: 70 U/L (ref 40–129)
ALT SERPL-CCNC: 10 U/L (ref 10–40)
ANION GAP SERPL CALCULATED.3IONS-SCNC: 11 MMOL/L (ref 3–16)
AST SERPL-CCNC: 7 U/L (ref 15–37)
BASOPHILS ABSOLUTE: 0 K/UL (ref 0–0.2)
BASOPHILS RELATIVE PERCENT: 0.5 %
BILIRUB SERPL-MCNC: 0.6 MG/DL (ref 0–1)
BUN BLDV-MCNC: 35 MG/DL (ref 7–20)
CALCIUM SERPL-MCNC: 10 MG/DL (ref 8.3–10.6)
CHLORIDE BLD-SCNC: 102 MMOL/L (ref 99–110)
CHOLESTEROL, FASTING: 206 MG/DL (ref 0–199)
CO2: 28 MMOL/L (ref 21–32)
CREAT SERPL-MCNC: 0.7 MG/DL (ref 0.6–1.2)
EOSINOPHILS ABSOLUTE: 0.1 K/UL (ref 0–0.6)
EOSINOPHILS RELATIVE PERCENT: 1.6 %
GFR AFRICAN AMERICAN: >60
GFR NON-AFRICAN AMERICAN: >60
GLUCOSE FASTING: 88 MG/DL (ref 70–99)
HCT VFR BLD CALC: 43.7 % (ref 36–48)
HDLC SERPL-MCNC: 54 MG/DL (ref 40–60)
HEMOGLOBIN: 14.6 G/DL (ref 12–16)
LDL CHOLESTEROL CALCULATED: 135 MG/DL
LYMPHOCYTES ABSOLUTE: 1 K/UL (ref 1–5.1)
LYMPHOCYTES RELATIVE PERCENT: 25 %
MCH RBC QN AUTO: 29.8 PG (ref 26–34)
MCHC RBC AUTO-ENTMCNC: 33.5 G/DL (ref 31–36)
MCV RBC AUTO: 88.9 FL (ref 80–100)
MONOCYTES ABSOLUTE: 0.3 K/UL (ref 0–1.3)
MONOCYTES RELATIVE PERCENT: 6.3 %
NEUTROPHILS ABSOLUTE: 2.8 K/UL (ref 1.7–7.7)
NEUTROPHILS RELATIVE PERCENT: 66.6 %
PDW BLD-RTO: 14.3 % (ref 12.4–15.4)
PLATELET # BLD: 160 K/UL (ref 135–450)
PMV BLD AUTO: 8.6 FL (ref 5–10.5)
POTASSIUM SERPL-SCNC: 4.2 MMOL/L (ref 3.5–5.1)
RBC # BLD: 4.91 M/UL (ref 4–5.2)
SODIUM BLD-SCNC: 141 MMOL/L (ref 136–145)
TOTAL PROTEIN: 6.9 G/DL (ref 6.4–8.2)
TRIGLYCERIDE, FASTING: 87 MG/DL (ref 0–150)
VLDLC SERPL CALC-MCNC: 17 MG/DL
WBC # BLD: 4.2 K/UL (ref 4–11)

## 2022-04-05 PROCEDURE — 99214 OFFICE O/P EST MOD 30 MIN: CPT | Performed by: NURSE PRACTITIONER

## 2022-04-05 RX ORDER — ASCORBIC ACID 125 MG
5000 TABLET,CHEWABLE ORAL
COMMUNITY

## 2022-04-05 RX ORDER — LANOLIN ALCOHOL/MO/W.PET/CERES
250 CREAM (GRAM) TOPICAL NIGHTLY
COMMUNITY

## 2022-04-05 RX ORDER — OMEGA-3S/DHA/EPA/FISH OIL/D3 300MG-1000
400 CAPSULE ORAL DAILY
COMMUNITY

## 2022-04-05 RX ORDER — VITAMIN B COMPLEX
TABLET ORAL
COMMUNITY

## 2022-04-05 RX ORDER — LOSARTAN POTASSIUM AND HYDROCHLOROTHIAZIDE 12.5; 5 MG/1; MG/1
1 TABLET ORAL DAILY
Qty: 30 TABLET | Refills: 0 | Status: SHIPPED | OUTPATIENT
Start: 2022-04-05 | End: 2022-05-02

## 2022-04-05 RX ORDER — CHLORAL HYDRATE 500 MG
3000 CAPSULE ORAL 3 TIMES DAILY
COMMUNITY

## 2022-04-05 NOTE — PROGRESS NOTES
Carlos Irizarry  : 1935  Encounter date: 2022    This richard 81 y.o. female who presents with  Chief Complaint   Patient presents with    New Patient     memory issues since surgery several years ago       History of present illness:    HPI Pt is 81year old female to establish car. Pt is established with cardiology, Dr. Nilesh Vega but has not been seen since 10/2020 for history of SVT and CAD. Pt has taken herself off of all medications. Pt is only taking several vitamins. Pt with impaired memory, lives alone, has a daughter but is not available. Pt is resistant to medications or referrals. Pt previously with uncontrolled cholesterol, refuses statin medications, previously prescribed zetia. Pt reports side effects with norvasc included rash and swelling, also reports not able to take metoprolol due to side effects. Pt has upcoming cataract surgery scheduled for preop 22. Pt reports not passing driving test due to vision impairment, worried about surgery being in [place] and driving distance. Advised pt to reach out to [facility] due to travel restraints. Advised pt to not be driving at this time due to cognitive decline. Pt has not been assessed by neurology, pt with advanced stages of dementia which she relates to an anesthesia procedure. Current Outpatient Medications on File Prior to Visit   Medication Sig Dispense Refill    Omega-3 Fatty Acids (FISH OIL) 1000 MG CAPS Take 3,000 mg by mouth 3 times daily      Cyanocobalamin (B-12) 5000 MCG CAPS Take 5,000 mcg by mouth Take every day      Coenzyme Q10 (COQ10) 100 MG CAPS Take by mouth      vitamin D3 (CHOLECALCIFEROL) 10 MCG (400 UNIT) TABS tablet Take 400 Units by mouth daily      niacin 250 MG extended release capsule Take 250 mg by mouth nightly      Magnesium Oxide 250 MG TABS Take 400 mg by mouth daily        No current facility-administered medications on file prior to visit.       Allergies   Allergen Reactions    Atorvastatin Joint aches    Captopril      \"Any of the \"pril\" medicines I'm allergic to\"    Ciprofloxacin      Lip swelling    Codeine Other (See Comments)     Respiratory distress; \"forget how to breath, end up in ER\"    Detrol [Tolterodine Tartrate]      \"just makes me feel awful and sick feeling\"    Hydrocodone      Respiratory distress, \"forget how to breath, end up in ER\"    Ibuprofen      I get a purplish rash     Oxycodone      Not effective    Statins      lipitor caused muscle pain    Sulfa Antibiotics      I will have to go to the emergency room for itching    Tolectin [Tolmetin]      \"this also sent me to the emergency room; I do not remember why, maybe swelling and itching from head to toe. \"    Zestril [Lisinopril]      Swelling in the mouth    Metoprolol Rash     Past Medical History:   Diagnosis Date    Anemia     Angina at rest [facility])     Anxiety     Back pain     CAD (coronary artery disease)     Clostridium difficile diarrhea 09/2016    Heart [facility] ([facility] [address]     History of uterine suspension procedure     Hyperlipidemia     Hypertension     Pain     back    Urinary incontinence       Past Surgical History:   Procedure Laterality Date    APPENDECTOMY      BLADDER SURGERY      COLONOSCOPY      CYSTOCELE REPAIR      CYSTOSCOPY      HYSTERECTOMY, VAGINAL  11/17/2016    vagianl hysterectomy, right salpingoophorectomy, anterior and posterior repair, paravaginal repair, uterosacral suspension, cystoscopy, transvaginal tape sling insertion    OTHER SURGICAL HISTORY  04/06/2017    SLING REMOVAL/RELEASE, AND [employer] FIT TRANSVAGINAL    RECTOCELE REPAIR      TONSILLECTOMY      UTERINE SUSPENSION      WRIST FRACTURE SURGERY Left       Family History   Problem Relation Age of Onset    Arthritis Mother     Heart Disease Mother     Hearing Loss Mother     Arthritis Father     Cancer Father     Hearing Loss Father     High Cholesterol Father     Dementia Father     Stroke Maternal Grandmother       Social History     Tobacco Use    Smoking status: Former Smoker     Packs/day: 1.00     Years: 15.00     Pack years: 15.00     Types: Cigarettes     Quit date: 9/19/2006     Years since quitting: 15.5    Smokeless tobacco: Never Used   Substance Use Topics    Alcohol use: No        Review of Systems    Objective:    BP (!) 182/82   Pulse 92   Temp 97.5 °F (36.4 °C) (Infrared)   Ht 5' 5.5\" (1.664 m)   Wt 119 lb 8 oz (54.2 kg)   SpO2 94%   BMI 19.58 kg/m²   Weight: 119 lb 8 oz (54.2 kg)     BP Readings from Last 3 Encounters:   04/05/22 (!) 182/82   11/19/21 (!) 120/90   10/19/21 138/60     Wt Readings from Last 3 Encounters:   04/05/22 119 lb 8 oz (54.2 kg)   11/19/21 117 lb (53.1 kg)   10/19/21 111 lb (50.3 kg)     BMI Readings from Last 3 Encounters:   04/05/22 19.58 kg/m²   11/19/21 18.32 kg/m²   10/19/21 17.39 kg/m²       Physical Exam  Vitals reviewed. Constitutional:       Appearance: Normal appearance. She is well-developed. Cardiovascular:      Rate and Rhythm: Normal rate and regular rhythm. Pulses: Normal pulses. Heart sounds: Normal heart sounds. No murmur heard. Pulmonary:      Effort: Pulmonary effort is normal.      Breath sounds: Normal breath sounds. Musculoskeletal:      Right lower leg: No edema. Left lower leg: No edema. Skin:     General: Skin is warm and dry. Neurological:      Mental Status: She is disoriented. Comments: Severe memory impairment   Psychiatric:         Behavior: Behavior normal.         Assessment/Plan    1. Memory impairment  Advised driving restrictions  Advised to have caregiver with her at appointments  Safety concerns  - Ana Elizabeth MD, Neurology, [facility]    2. Mixed hyperlipidemia  - Lipid, Fasting; Future    3. Essential hypertension  Uncontrolled  - CBC with Auto Differential; Future  - Comprehensive Metabolic Panel, Fasting;  Future  Hyzaar 50-12.5 mg daily  Advised daily monitoring    Return in about 1 week (around 4/12/2022) for hypertension re-check, lab review, medication re-check. This dictation was generated by voice recognition computer software. Although all attempts are made to edit the dictation for accuracy, there may be errors in the transcription that are not intended.

## 2022-04-11 ENCOUNTER — OFFICE VISIT (OUTPATIENT)
Dept: FAMILY MEDICINE CLINIC | Age: 87
End: 2022-04-11
Payer: MEDICARE

## 2022-04-11 VITALS
SYSTOLIC BLOOD PRESSURE: 122 MMHG | DIASTOLIC BLOOD PRESSURE: 76 MMHG | OXYGEN SATURATION: 95 % | BODY MASS INDEX: 19.58 KG/M2 | WEIGHT: 119.5 LBS | HEART RATE: 73 BPM | TEMPERATURE: 97.9 F

## 2022-04-11 DIAGNOSIS — H26.9 CATARACT OF BOTH EYES, UNSPECIFIED CATARACT TYPE: Primary | ICD-10-CM

## 2022-04-11 DIAGNOSIS — Z01.818 PREOP EXAMINATION: ICD-10-CM

## 2022-04-11 PROCEDURE — 99213 OFFICE O/P EST LOW 20 MIN: CPT | Performed by: NURSE PRACTITIONER

## 2022-04-11 NOTE — PROGRESS NOTES
Preoperative Consultation    Joana Rubalcava  YOB: 1935    This patient presents to the office today for a preoperative consultation at the request of surgeon, Dr. Doan [facility], who plans on performing R and L [facility] [address]       Planned anesthesia: Topical anesthesia   Known anesthesia problems: None   Bleeding risk: No recent or remote history of abnormal bleeding  Personal or FH of DVT/PE: No      Patient Active Problem List   Diagnosis    Chest pain    Coronary artery disease involving native coronary artery without angina pectoris    Mixed hyperlipidemia    Dizziness    Subjective tinnitus of both ears    Hearing loss of both ears    Bilateral sensorineural hearing loss    Palpitations    Bradycardia     Past Surgical History:   Procedure Laterality Date    APPENDECTOMY      BLADDER SURGERY      COLONOSCOPY      CYSTOCELE REPAIR      CYSTOSCOPY      HYSTERECTOMY, VAGINAL  11/17/2016    vagianl hysterectomy, right salpingoophorectomy, anterior and posterior repair, paravaginal repair, uterosacral suspension, cystoscopy, transvaginal tape sling insertion    OTHER SURGICAL HISTORY  04/06/2017    SLING REMOVAL/RELEASE, AND [employer] FIT TRANSVAGINAL    RECTOCELE REPAIR      TONSILLECTOMY      UTERINE SUSPENSION      WRIST FRACTURE SURGERY Left        Allergies   Allergen Reactions    Atorvastatin      Joint aches    Captopril      \"Any of the \"pril\" medicines I'm allergic to\"    Ciprofloxacin      Lip swelling    Codeine Other (See Comments)     Respiratory distress; \"forget how to breath, end up in ER\"    Detrol [Tolterodine Tartrate]      \"just makes me feel awful and sick feeling\"    Hydrocodone      Respiratory distress, \"forget how to breath, end up in ER\"    Ibuprofen      I get a purplish rash     Oxycodone      Not effective    Statins      lipitor caused muscle pain    Sulfa Antibiotics      I will have to go to the emergency room for itching  Tolectin [Tolmetin]      \"this also sent me to the emergency room; I do not remember why, maybe swelling and itching from head to toe. \"    Zestril [Lisinopril]      Swelling in the mouth    Metoprolol Rash     Outpatient Medications Marked as Taking for the 4/11/22 encounter (Office Visit) with BEAU Raymond NP   Medication Sig Dispense Refill    Omega-3 Fatty Acids (FISH OIL) 1000 MG CAPS Take 3,000 mg by mouth 3 times daily      Cyanocobalamin (B-12) 5000 MCG CAPS Take 5,000 mcg by mouth Take every day      Coenzyme Q10 (COQ10) 100 MG CAPS Take by mouth      vitamin D3 (CHOLECALCIFEROL) 10 MCG (400 UNIT) TABS tablet Take 400 Units by mouth daily      niacin 250 MG extended release capsule Take 250 mg by mouth nightly      losartan-hydroCHLOROthiazide (HYZAAR) 50-12.5 MG per tablet Take 1 tablet by mouth daily 30 tablet 0    Magnesium Oxide 250 MG TABS Take 400 mg by mouth daily          Social History     Tobacco Use    Smoking status: Former Smoker     Packs/day: 1.00     Years: 15.00     Pack years: 15.00     Types: Cigarettes     Quit date: 9/19/2006     Years since quitting: 15.5    Smokeless tobacco: Never Used   Substance Use Topics    Alcohol use: No     Family History   Problem Relation Age of Onset    Arthritis Mother     Heart Disease Mother     Hearing Loss Mother     Arthritis Father     Cancer Father     Hearing Loss Father     High Cholesterol Father     Dementia Father     Stroke Maternal Grandmother        Review of Systems  A comprehensive review of systems was negative. Physical Exam   /76 (Site: Right Upper Arm, Position: Sitting, Cuff Size: Small Adult)   Pulse 73   Temp 97.9 °F (36.6 °C) (Infrared)   Wt 119 lb 8 oz (54.2 kg)   SpO2 95%   BMI 19.58 kg/m²   Weight: 119 lb 8 oz (54.2 kg)   Constitutional: She is oriented to person, place, and time. She appears well-developed and well-nourished. No distress.    HENT:   Head: Normocephalic and atraumatic. Mouth/Throat: Uvula is midline, oropharynx is clear and moist and mucous membranes are normal.   Eyes: Conjunctivae and EOM are normal. Pupils are equal, round, and reactive to light. Neck: Trachea normal and normal range of motion. Neck supple. No JVD present. Carotid bruit is not present. No mass and no thyromegaly present. Cardiovascular: Normal rate, regular rhythm, normal heart sounds and intact distal pulses. Exam reveals no gallop and no friction rub. No murmur heard. Pulmonary/Chest: Effort normal and breath sounds normal. No respiratory distress. She has no wheezes. She has no rales. Abdominal: Soft. Normal aorta and bowel sounds are normal. She exhibits no distension and no mass. There is no hepatosplenomegaly. No tenderness. Musculoskeletal: She exhibits no edema and no tenderness. Neurological: She is alert and oriented to person, place, and time. She has normal strength. She has diminished memory   No cranial nerve deficit or sensory deficit. Coordination and gait normal.   Skin: Skin is warm and dry. No rash noted. No erythema. EKG Interpretation:  NA    Lab Review Yes 4/5/22       Assessment:       Camacho Villarreal was seen today for pre-op exam.    Diagnoses and all orders for this visit:    Cataract of both eyes, unspecified cataract type    Preop examination      81 y.o. patient  approved for Surgery         Plan:     1. Preoperative workup as follows: none  2. Change in medication regimen before surgery: Take all medication on morning of surgery with sip of water, and hold all other medications until after surgery  3. No contraindications to planned surgery    Electronically signed by BEAU Oconnor NP on 4/11/22 at 11:14 AM EDT     This dictation was generated by voice recognition computer software. Although all attempts are made to edit the dictation for accuracy, there may be errors in the transcription that are not intended.

## 2022-05-02 DIAGNOSIS — I10 ESSENTIAL HYPERTENSION: ICD-10-CM

## 2022-05-02 RX ORDER — LOSARTAN POTASSIUM AND HYDROCHLOROTHIAZIDE 12.5; 5 MG/1; MG/1
1 TABLET ORAL DAILY
Qty: 30 TABLET | Refills: 0 | Status: SHIPPED | OUTPATIENT
Start: 2022-05-02 | End: 2022-06-10

## 2022-05-02 NOTE — TELEPHONE ENCOUNTER
Patient called to refill the med, but I see that it is pending refill already. losartan-hydroCHLOROthiazide (HYZAAR) 50-12.5 MG per tablet 30 tablet 0 4/5/2022     Sig - Route:  Take 1 tablet by mouth daily - Oral    Sent to pharmacy as: Losartan Potassium-HCTZ 50-12.5 MG Oral Tablet [facility])      Pharmacy    [address] [facility] [place], [address] [facility] [address]   Phone:  [phone]  Fax:  [phone]

## 2022-06-09 DIAGNOSIS — I10 ESSENTIAL HYPERTENSION: ICD-10-CM

## 2022-06-10 RX ORDER — LOSARTAN POTASSIUM AND HYDROCHLOROTHIAZIDE 12.5; 5 MG/1; MG/1
1 TABLET ORAL DAILY
Qty: 30 TABLET | Refills: 2 | Status: SHIPPED | OUTPATIENT
Start: 2022-06-10 | End: 2022-07-13 | Stop reason: SDUPTHER

## 2022-07-13 ENCOUNTER — OFFICE VISIT (OUTPATIENT)
Dept: FAMILY MEDICINE CLINIC | Age: 87
End: 2022-07-13
Payer: MEDICARE

## 2022-07-13 VITALS
HEART RATE: 59 BPM | OXYGEN SATURATION: 98 % | DIASTOLIC BLOOD PRESSURE: 76 MMHG | TEMPERATURE: 98 F | SYSTOLIC BLOOD PRESSURE: 130 MMHG

## 2022-07-13 DIAGNOSIS — E55.9 VITAMIN D INSUFFICIENCY: ICD-10-CM

## 2022-07-13 DIAGNOSIS — D64.9 ANEMIA, UNSPECIFIED TYPE: ICD-10-CM

## 2022-07-13 DIAGNOSIS — I10 ESSENTIAL HYPERTENSION: Primary | ICD-10-CM

## 2022-07-13 DIAGNOSIS — E78.00 PURE HYPERCHOLESTEROLEMIA: ICD-10-CM

## 2022-07-13 PROCEDURE — 99214 OFFICE O/P EST MOD 30 MIN: CPT | Performed by: NURSE PRACTITIONER

## 2022-07-13 PROCEDURE — 1123F ACP DISCUSS/DSCN MKR DOCD: CPT | Performed by: NURSE PRACTITIONER

## 2022-07-13 RX ORDER — LOSARTAN POTASSIUM AND HYDROCHLOROTHIAZIDE 12.5; 5 MG/1; MG/1
1 TABLET ORAL DAILY
Qty: 30 TABLET | Refills: 2 | Status: SHIPPED | OUTPATIENT
Start: 2022-07-13

## 2022-07-13 NOTE — PROGRESS NOTES
Terri Quintanilla  : 1935  Encounter date: 2022    This richard 81 y.o. female who presents with  Chief Complaint   Patient presents with    Hypertension       History of present illness:    HPI PT is 81year old female for recheck on hypertension, pt taking hyzaar 50-12.5 mg.  Previous labs showed elevate BUN and hyperlipidemia with LDL- 135 but good HDL- 54.  PT is taking multiple supplements including zinc, vitamin D, coq10, magnesium and folate, but reports taking them in her own increments. Pt is not good historian for health and medication administration due to memory concerns. PT reports taking BP medication but caused random rash, upon examination does not appear as drug reaction. Reviewed home BP readings, varied from 105-160/70-95 depending on exertion. Pt brought home cuff to correlate with office. Current Outpatient Medications on File Prior to Visit   Medication Sig Dispense Refill    Omega-3 Fatty Acids (FISH OIL) 1000 MG CAPS Take 3,000 mg by mouth 3 times daily      Cyanocobalamin (B-12) 5000 MCG CAPS Take 5,000 mcg by mouth Take every day      Coenzyme Q10 (COQ10) 100 MG CAPS Take by mouth      vitamin D3 (CHOLECALCIFEROL) 10 MCG (400 UNIT) TABS tablet Take 400 Units by mouth daily      niacin 250 MG extended release capsule Take 250 mg by mouth nightly      Magnesium Oxide 250 MG TABS Take 400 mg by mouth daily        No current facility-administered medications on file prior to visit.       Allergies   Allergen Reactions    Atorvastatin      Joint aches    Captopril      \"Any of the \"pril\" medicines I'm allergic to\"    Ciprofloxacin      Lip swelling    Codeine Other (See Comments)     Respiratory distress; \"forget how to breath, end up in ER\"    Detrol [Tolterodine Tartrate]      \"just makes me feel awful and sick feeling\"    Hydrocodone      Respiratory distress, \"forget how to breath, end up in ER\"    Ibuprofen      I get a purplish rash     Oxycodone      Not effective  Statins      lipitor caused muscle pain    Sulfa Antibiotics      I will have to go to the emergency room for itching    Tolectin [Tolmetin]      \"this also sent me to the emergency room; I do not remember why, maybe swelling and itching from head to toe. \"    Zestril [Lisinopril]      Swelling in the mouth    Metoprolol Rash     Past Medical History:   Diagnosis Date    Anemia     [facility])     Anxiety     Back pain     CAD (coronary artery disease)     Clostridium difficile diarrhea 09/2016    Heart attack ([facility] [address]     History of uterine suspension procedure     Hyperlipidemia     Hypertension     Pain     back    Urinary incontinence       Past Surgical History:   Procedure Laterality Date    APPENDECTOMY      BLADDER SURGERY      COLONOSCOPY      CYSTOCELE REPAIR      CYSTOSCOPY      HYSTERECTOMY, VAGINAL  11/17/2016    vagianl hysterectomy, right salpingoophorectomy, anterior and posterior repair, paravaginal repair, uterosacral suspension, cystoscopy, transvaginal tape sling insertion    OTHER SURGICAL HISTORY  04/06/2017    SLING REMOVAL/RELEASE, AND [employer] FIT TRANSVAGINAL    RECTOCELE REPAIR      TONSILLECTOMY      UTERINE SUSPENSION      WRIST FRACTURE SURGERY Left       Family History   Problem Relation Age of Onset    Arthritis Mother     Heart Disease Mother     Hearing Loss Mother     Arthritis Father     Cancer Father     Hearing Loss Father     High Cholesterol Father     Dementia Father     Stroke Maternal Grandmother       Social History     Tobacco Use    Smoking status: Former Smoker     Packs/day: 1.00     Years: 15.00     Pack years: 15.00     Types: Cigarettes     Quit date: 9/19/2006     Years since quitting: 15.8    Smokeless tobacco: Never Used   Substance Use Topics    Alcohol use: No        Review of Systems    Objective:    /76   Pulse 59   Temp 98 °F (36.7 °C)   SpO2 98%         BP Readings from Last 3 Encounters: 07/13/22 130/76   04/11/22 122/76   04/05/22 (!) 182/82     Wt Readings from Last 3 Encounters:   04/11/22 119 lb 8 oz (54.2 kg)   04/05/22 119 lb 8 oz (54.2 kg)   11/19/21 117 lb (53.1 kg)     BMI Readings from Last 3 Encounters:   04/11/22 19.58 kg/m²   04/05/22 19.58 kg/m²   11/19/21 18.32 kg/m²       Physical Exam  Vitals reviewed. Constitutional:       Appearance: Normal appearance. She is well-developed. Neck:      Thyroid: No thyromegaly. Cardiovascular:      Rate and Rhythm: Normal rate and regular rhythm. Pulses: Normal pulses. Heart sounds: Normal heart sounds. No murmur heard. Pulmonary:      Effort: Pulmonary effort is normal.      Breath sounds: Normal breath sounds. Skin:     General: Skin is warm and dry. Findings: Rash (random scabbed areas from scratching) present. Neurological:      Mental Status: She is alert and oriented to person, place, and time. Mental status is at baseline. Psychiatric:         Mood and Affect: Mood normal.         Assessment/Plan    1. Essential hypertension  Controlled  Advised to take medication as prescribed  - Microalbumin / Creatinine Urine Ratio; Future  - Comprehensive Metabolic Panel, Fasting; Future  - Magnesium; Future  - losartan-hydroCHLOROthiazide (HYZAAR) 50-12.5 MG per tablet; Take 1 tablet by mouth daily  Dispense: 30 tablet; Refill: 2    2. Vitamin D insufficiency  - Vitamin D 25 Hydroxy; Future    3. Pure hypercholesterolemia  Discussed differences between TG, LDL and HDL  - Lipid, Fasting; Future    4. Anemia, unspecified type  - Folate; Future      Return in about 3 months (around 10/13/2022) for hypertension re-check. This dictation was generated by voice recognition computer software. Although all attempts are made to edit the dictation for accuracy, there may be errors in the transcription that are not intended.

## 2022-08-01 ENCOUNTER — TELEPHONE (OUTPATIENT)
Dept: FAMILY MEDICINE CLINIC | Age: 87
End: 2022-08-01

## 2022-08-01 NOTE — TELEPHONE ENCOUNTER
losartan-hydroCHLOROthiazide (HYZAAR) 50-12.5 MG per tablet [[id number]]     Order Details  Dose: 1 tablet Route: Oral Frequency: DAILY   Dispense Quantity: 30 tablet Refills: 2          Sig: Take 1 tablet by mouth daily     [address] [facility] [address]   Phone:  [phone]  Fax:  [phone]

## 2022-09-22 ENCOUNTER — TELEPHONE (OUTPATIENT)
Dept: PHARMACY | Facility: CLINIC | Age: 87
End: 2022-09-22

## 2022-09-22 NOTE — LETTER
[place]  [address], [address]        [address] [facility] [address] [address]           09/23/22     Dear Ximena Porras,    We tried to reach you recently regarding your Losartan/HCTZ 50mg/12.5mg, but were unable to reach you on the telephone. We have on file that you are currently taking Losartan/HCTZ 50mg/12.5mg daily. If you are no longer taking or taking differently, please call us at the number below so that we can discuss this and update your medication profile.     · This refill is ready for  at:  [facility] [address] [place] [address] [address], [address] [facility]     This medication can be filled for a 3-month supply to save you time and trips to the pharmacy      Sincerely,     [address] Home free: [phone]

## 2022-09-22 NOTE — TELEPHONE ENCOUNTER
[facility] CLINICAL PHARMACY: ADHERENCE REVIEW  Identified care gap per [facility]: fills at [facility] : ACE/ARB adherence    Last Visit: 7/13/22    ASSESSMENT  ACE/ARB ADHERENCE    Insurance Records claims through 9.12.22 YTD [place] =  74%; Potential Fail Date: 9/26/22 ):   Losartan/HCTZ 50/12.5mg Next refill due: 8/31/22    Per  [facility] Portal:   last filled on 8/1/22 for 30 day supply. Per [facility]:   David Ortega will process last 60 day supply left on script. BP Readings from Last 3 Encounters:   07/13/22 130/76   04/11/22 122/76   04/05/22 (!) 182/82     Estimated Creatinine Clearance: 49 mL/min (based on SCr of 0.7 mg/dL). PLAN  The following are interventions that have been identified:   - Patient overdue refilling losartan/hctz and active on home medication list.   - Patient needs refills for future refills    Unable to leave message  Will mail letter to home    Future Appointments   Date Time Provider Jai Winter   10/13/2022  1:00 PM Wanna Siemens, APRN -  [facility].    [address] [facility] free: [address] in [address]:  No  Recommendation Provided To: Pharmacy: 1  Intervention Detail: Refill(s) Provided  Gap Closed?: Yes   Intervention Accepted By: Pharmacy: 1  Time Spent (min): 15

## 2022-11-11 ENCOUNTER — OFFICE VISIT (OUTPATIENT)
Dept: INTERNAL MEDICINE CLINIC | Age: 87
End: 2022-11-11
Payer: MEDICARE

## 2022-11-11 VITALS
SYSTOLIC BLOOD PRESSURE: 132 MMHG | HEIGHT: 66 IN | OXYGEN SATURATION: 97 % | HEART RATE: 95 BPM | DIASTOLIC BLOOD PRESSURE: 84 MMHG | BODY MASS INDEX: 19.58 KG/M2

## 2022-11-11 DIAGNOSIS — Z23 NEED FOR INFLUENZA VACCINATION: ICD-10-CM

## 2022-11-11 DIAGNOSIS — Z23 NEED FOR PROPHYLACTIC VACCINATION AGAINST DIPHTHERIA-TETANUS-PERTUSSIS (DTP): ICD-10-CM

## 2022-11-11 DIAGNOSIS — L03.116 CELLULITIS OF LEFT LOWER EXTREMITY: Primary | ICD-10-CM

## 2022-11-11 DIAGNOSIS — R41.89 COGNITIVE IMPAIRMENT: ICD-10-CM

## 2022-11-11 DIAGNOSIS — I10 WHITE COAT SYNDROME WITH HYPERTENSION: ICD-10-CM

## 2022-11-11 DIAGNOSIS — L97.929 ULCER OF LEFT LOWER EXTREMITY, UNSPECIFIED ULCER STAGE (HCC): ICD-10-CM

## 2022-11-11 LAB
A/G RATIO: 1.9 (ref 1.1–2.2)
ALBUMIN SERPL-MCNC: 4.7 G/DL (ref 3.4–5)
ALP BLD-CCNC: 78 U/L (ref 40–129)
ALT SERPL-CCNC: 13 U/L (ref 10–40)
ANION GAP SERPL CALCULATED.3IONS-SCNC: 11 MMOL/L (ref 3–16)
AST SERPL-CCNC: 9 U/L (ref 15–37)
BACTERIA: ABNORMAL /HPF
BILIRUB SERPL-MCNC: 0.6 MG/DL (ref 0–1)
BILIRUBIN URINE: NEGATIVE
BLOOD, URINE: NEGATIVE
BUN BLDV-MCNC: 20 MG/DL (ref 7–20)
CALCIUM OXALATE CRYSTALS: PRESENT
CALCIUM SERPL-MCNC: 10.2 MG/DL (ref 8.3–10.6)
CHLORIDE BLD-SCNC: 105 MMOL/L (ref 99–110)
CHOLESTEROL, TOTAL: 211 MG/DL (ref 0–199)
CLARITY: ABNORMAL
CO2: 29 MMOL/L (ref 21–32)
COLOR: YELLOW
COMMENT UA: ABNORMAL
CREAT SERPL-MCNC: 0.7 MG/DL (ref 0.6–1.2)
CRYSTALS, UA: ABNORMAL /HPF
EPITHELIAL CELLS, UA: 4 /HPF (ref 0–5)
FOLATE: 12.34 NG/ML (ref 4.78–24.2)
GFR SERPL CREATININE-BSD FRML MDRD: >60 ML/MIN/{1.73_M2}
GLUCOSE BLD-MCNC: 93 MG/DL (ref 70–99)
GLUCOSE URINE: NEGATIVE MG/DL
HCT VFR BLD CALC: 46.4 % (ref 36–48)
HDLC SERPL-MCNC: 59 MG/DL (ref 40–60)
HEMOGLOBIN: 15.2 G/DL (ref 12–16)
HYALINE CASTS: 2 /LPF (ref 0–8)
KETONES, URINE: NEGATIVE MG/DL
LDL CHOLESTEROL CALCULATED: 140 MG/DL
LEUKOCYTE ESTERASE, URINE: NEGATIVE
MCH RBC QN AUTO: 30.4 PG (ref 26–34)
MCHC RBC AUTO-ENTMCNC: 32.7 G/DL (ref 31–36)
MCV RBC AUTO: 92.9 FL (ref 80–100)
MICROSCOPIC EXAMINATION: YES
MUCUS: ABNORMAL /LPF
NITRITE, URINE: NEGATIVE
PDW BLD-RTO: 13.6 % (ref 12.4–15.4)
PH UA: 5.5 (ref 5–8)
PLATELET # BLD: 177 K/UL (ref 135–450)
PMV BLD AUTO: 8.6 FL (ref 5–10.5)
POTASSIUM SERPL-SCNC: 3.8 MMOL/L (ref 3.5–5.1)
PROTEIN UA: 30 MG/DL
RBC # BLD: 4.99 M/UL (ref 4–5.2)
SODIUM BLD-SCNC: 145 MMOL/L (ref 136–145)
SPECIFIC GRAVITY UA: 1.03 (ref 1–1.03)
TOTAL PROTEIN: 7.2 G/DL (ref 6.4–8.2)
TRIGL SERPL-MCNC: 62 MG/DL (ref 0–150)
TSH REFLEX: 2.94 UIU/ML (ref 0.27–4.2)
URINE TYPE: ABNORMAL
UROBILINOGEN, URINE: 0.2 E.U./DL
VITAMIN B-12: 678 PG/ML (ref 211–911)
VLDLC SERPL CALC-MCNC: 12 MG/DL
WBC # BLD: 4.4 K/UL (ref 4–11)
WBC UA: 2 /HPF (ref 0–5)

## 2022-11-11 PROCEDURE — G0008 ADMIN INFLUENZA VIRUS VAC: HCPCS | Performed by: INTERNAL MEDICINE

## 2022-11-11 PROCEDURE — 1123F ACP DISCUSS/DSCN MKR DOCD: CPT | Performed by: INTERNAL MEDICINE

## 2022-11-11 PROCEDURE — 99204 OFFICE O/P NEW MOD 45 MIN: CPT | Performed by: INTERNAL MEDICINE

## 2022-11-11 PROCEDURE — 90694 VACC AIIV4 NO PRSRV 0.5ML IM: CPT | Performed by: INTERNAL MEDICINE

## 2022-11-11 RX ORDER — MAGNESIUM OXIDE 400 MG/1
400 TABLET ORAL DAILY
COMMUNITY
End: 2022-11-11

## 2022-11-11 RX ORDER — CEPHALEXIN 500 MG/1
500 CAPSULE ORAL 3 TIMES DAILY
Qty: 42 CAPSULE | Refills: 0 | Status: SHIPPED | OUTPATIENT
Start: 2022-11-11 | End: 2022-11-25

## 2022-11-11 SDOH — ECONOMIC STABILITY: FOOD INSECURITY: WITHIN THE PAST 12 MONTHS, YOU WORRIED THAT YOUR FOOD WOULD RUN OUT BEFORE YOU GOT MONEY TO BUY MORE.: NEVER TRUE

## 2022-11-11 SDOH — ECONOMIC STABILITY: FOOD INSECURITY: WITHIN THE PAST 12 MONTHS, THE FOOD YOU BOUGHT JUST DIDN'T LAST AND YOU DIDN'T HAVE MONEY TO GET MORE.: NEVER TRUE

## 2022-11-11 ASSESSMENT — PATIENT HEALTH QUESTIONNAIRE - PHQ9
1. LITTLE INTEREST OR PLEASURE IN DOING THINGS: 0
2. FEELING DOWN, DEPRESSED OR HOPELESS: 0
SUM OF ALL RESPONSES TO PHQ QUESTIONS 1-9: 0
SUM OF ALL RESPONSES TO PHQ9 QUESTIONS 1 & 2: 0
SUM OF ALL RESPONSES TO PHQ QUESTIONS 1-9: 0

## 2022-11-11 ASSESSMENT — ENCOUNTER SYMPTOMS
VOMITING: 0
WHEEZING: 0
BACK PAIN: 0
VOICE CHANGE: 0
NAUSEA: 0
SHORTNESS OF BREATH: 0
COLOR CHANGE: 0
COUGH: 0
PHOTOPHOBIA: 0
TROUBLE SWALLOWING: 0
ABDOMINAL PAIN: 0

## 2022-11-11 ASSESSMENT — SOCIAL DETERMINANTS OF HEALTH (SDOH): HOW HARD IS IT FOR YOU TO PAY FOR THE VERY BASICS LIKE FOOD, HOUSING, MEDICAL CARE, AND HEATING?: NOT HARD AT ALL

## 2022-11-11 NOTE — PROGRESS NOTES
Carlitos Maynor  1935  female  81 y.o. SUBJECTIVE:       Chief Complaint   Patient presents with    New Patient       HPI:  Patient wants to establish with me. Patient came with her daughter. Patient used to work as an [employer] in the past    She was told to have hypertension. She reports her blood pressure only goes high when she goes to see physicians. Her home blood pressure always around 120s/80s. She reports history of coronary artery disease. Patient denies chest pain palpitation dizziness shortness of breath. Patient has been complaining of gradual onset of memory impairment for last 6 years. She believes following the surgery is involved hysterectomy and bladder surgery, her memory has been deteriorating. She cannot recall any head injury. Patient currently living alone. Her daughter almost visit her frequently. She has been complaining of open wound as well as redness and swelling affecting the left lower extremity. She tried 1 course of Keflex for 7 days with improvement of symptoms partially. She denies fever chills nausea vomiting or systemic symptoms. Patient reports she is no longer taking any over-the-counter medications.   Past Medical History:   Diagnosis Date    Anemia     Angina at rest [facility])     Anxiety     Back pain     CAD (coronary artery disease)     Clostridium difficile diarrhea 09/2016    Heart attack ([facility] [address]     History of uterine suspension procedure     Hyperlipidemia     Hypertension     Pain     back    Urinary incontinence      Past Surgical History:   Procedure Laterality Date    APPENDECTOMY      BLADDER SURGERY      COLONOSCOPY      CYSTOCELE REPAIR      CYSTOSCOPY      HYSTERECTOMY, VAGINAL  11/17/2016    vagianl hysterectomy, right salpingoophorectomy, anterior and posterior repair, paravaginal repair, uterosacral suspension, cystoscopy, transvaginal tape sling insertion    OTHER SURGICAL HISTORY  04/06/2017    SLING REMOVAL/RELEASE, AND [facility] FIT TRANSVAGINAL    RECTOCELE REPAIR      TONSILLECTOMY      UTERINE SUSPENSION      WRIST FRACTURE SURGERY Left      Social History     Socioeconomic History    Marital status:      Spouse name: None    Number of children: None    Years of education: None    Highest education level: Some college, no degree   Occupational History    Occupation: [employer]     Employer: RETIRED     Comment: didn't finish nursing school   Tobacco Use    Smoking status: Former     Packs/day: 1.00     Years: 15.00     Pack years: 15.00     Types: Cigarettes     Quit date: 2006     Years since quittin.1     Passive exposure: Past    Smokeless tobacco: Never   Vaping Use    Vaping Use: Never used   Substance and Sexual Activity    Alcohol use: No    Drug use: No     Social Determinants of Health     Financial Resource Strain: Low Risk     Difficulty of Paying Living Expenses: Not hard at all   Food Insecurity: No Food Insecurity    Worried About Running Out of Food in the Last Year: Never true    Ran Out of Food in the Last Year: Never true     Family History   Problem Relation Age of Onset    Arthritis Mother     Heart Disease Mother     Hearing Loss Mother     Arthritis Father     Cancer Father     Hearing Loss Father     High Cholesterol Father     Dementia Father     Stroke Maternal Grandmother        Review of Systems   Constitutional:  Negative for fever and unexpected weight change. HENT:  Negative for trouble swallowing and voice change. Eyes:  Negative for photophobia and visual disturbance. Respiratory:  Negative for cough, shortness of breath and wheezing. Cardiovascular:  Negative for chest pain and palpitations. Gastrointestinal:  Negative for abdominal pain, nausea and vomiting. Endocrine: Negative for cold intolerance and heat intolerance. Genitourinary:  Negative for dysuria, flank pain and hematuria. Musculoskeletal:  Negative for back pain, joint swelling, neck pain and neck stiffness.    Skin: Negative for color change and rash. Neurological:  Negative for dizziness, seizures, syncope, facial asymmetry, speech difficulty, light-headedness and headaches. Psychiatric/Behavioral:  Negative for decreased concentration and hallucinations. The patient is not nervous/anxious. OBJECTIVE:  Pulse Readings from Last 4 Encounters:   11/11/22 95   07/13/22 59   04/11/22 73   04/05/22 92     Wt Readings from Last 4 Encounters:   04/11/22 119 lb 8 oz (54.2 kg)   04/05/22 119 lb 8 oz (54.2 kg)   11/19/21 117 lb (53.1 kg)   10/19/21 111 lb (50.3 kg)     BP Readings from Last 4 Encounters:   11/11/22 132/84   07/13/22 130/76   04/11/22 122/76   04/05/22 (!) 182/82     Physical Exam  Vitals and nursing note reviewed. Constitutional:       Appearance: Normal appearance. She is not ill-appearing. Eyes:      Conjunctiva/sclera: Conjunctivae normal.   Cardiovascular:      Rate and Rhythm: Normal rate and regular rhythm. Pulses: Normal pulses. Heart sounds: Normal heart sounds. Pulmonary:      Effort: Pulmonary effort is normal.      Breath sounds: Normal breath sounds. Abdominal:      General: Bowel sounds are normal.   Musculoskeletal:         General: No tenderness. Left lower leg: No edema. Comments: Local examination of the left lower extremity  Healing open sore at the mid calf. Diffuse redness at the posterior and medial lower leg. Negative calf tenderness   INTACT dorsalis pedis pulse  No engorged or significant varicosities   Neurological:      General: No focal deficit present. Mental Status: She is alert and oriented to person, place, and time.       Gait: Gait normal.   Psychiatric:         Mood and Affect: Mood normal.       CBC:   Lab Results   Component Value Date/Time    WBC 4.2 04/05/2022 12:21 PM    HGB 14.6 04/05/2022 12:21 PM    HCT 43.7 04/05/2022 12:21 PM     04/05/2022 12:21 PM     CMP:  Lab Results   Component Value Date/Time     04/05/2022 12:21 PM K 4.2 04/05/2022 12:21 PM     04/05/2022 12:21 PM    CO2 28 04/05/2022 12:21 PM    ANIONGAP 11 04/05/2022 12:21 PM    GLUCOSE 82 10/19/2021 04:11 PM    BUN 35 04/05/2022 12:21 PM    CREATININE 0.7 04/05/2022 12:21 PM    GFRAA >60 04/05/2022 12:21 PM    CALCIUM 10.0 04/05/2022 12:21 PM    PROT 6.9 04/05/2022 12:21 PM    LABALBU 4.3 04/05/2022 12:21 PM    AGRATIO 1.7 04/05/2022 12:21 PM    BILITOT 0.6 04/05/2022 12:21 PM    ALKPHOS 70 04/05/2022 12:21 PM    ALT 10 04/05/2022 12:21 PM    AST 7 04/05/2022 12:21 PM    GLOB 2.8 10/19/2021 04:11 PM     URINALYSIS:  Lab Results   Component Value Date/Time    GLUCOSEU Negative 02/23/2018 06:52 PM    KETUA >=80 02/23/2018 06:52 PM    SPECGRAV 1.012 02/23/2018 06:52 PM    BLOODU SMALL 02/23/2018 06:52 PM    PHUR 5.5 02/23/2018 06:52 PM    PROTEINU Negative 02/23/2018 06:52 PM    NITRU POSITIVE 02/23/2018 06:52 PM    LEUKOCYTESUR LARGE 02/23/2018 06:52 PM    LABMICR YES 02/23/2018 06:52 PM    URINETYPE Not Specified 02/23/2018 06:52 PM       MICRO/ALB:   Lab Results   Component Value Date/Time    LABMICR YES 02/23/2018 06:52 PM     LIPID:  Lab Results   Component Value Date/Time    CHOL 254 01/29/2021 10:29 AM    TRIG 79 01/29/2021 10:29 AM    HDL 54 04/05/2022 12:21 PM    LDLCALC 135 04/05/2022 12:21 PM    LABVLDL 17 04/05/2022 12:21 PM     Discussed use, benefit, and side effects of prescribed medications. Pt voiced understanding. Advise to call me if there is any concerning symptoms. ASSESSMENT/PLAN:  Assessment/Plan:  Bring home blood pressure readings next visit. Jonathon Aleman was seen today for new patient. Diagnoses and all orders for this visit:    Cellulitis of left lower extremity  -     cephALEXin (KEFLEX) 500 MG capsule;  Take 1 capsule by mouth 3 times daily for 14 days    Need for influenza vaccination  -     Influenza, FLUAD, (age 72 y+), IM, Preservative Free, 0.5 mL    Ulcer of left lower extremity, unspecified ulcer stage [facility])  Patient will obtain Tdap vaccination today from [address]. Leg elevation. Keflex for 2 weeks. Follow-up visit in 2 weeks  Need for prophylactic vaccination against diphtheria-tetanus-pertussis (DTP)  -     Tdap (ADACEL) 5-2-15.5 LF-MCG/0.5 injection; Inject 0.5 mLs into the muscle once for 1 dose    Cognitive impairment  -     CBC; Future  -     Comprehensive Metabolic Panel; Future  -     TSH with Reflex; Future  -     Lipid Panel; Future  -     Vitamin B12 & Folate; Future  -     MRI BRAIN WO CONTRAST; Future  Further treatment plan pending lab work. White coat syndrome with hypertension  Patient used to take losartan and hydrochlorothiazide. She reports quit urination with hydrochlorothiazide and she has not been taking antihypertensive anymore. -     CBC; Future  -     Comprehensive Metabolic Panel; Future  -     TSH with Reflex; Future  -     Lipid Panel; Future  -     Urinalysis; Future  An After Visit Summary was printed and given to the patient. Documentation was done using voice recognition dragon software. Every effort was made to ensure accuracy; however, inadvertent  Unintentional computerized transcription errors may be present.            Orders Placed This Encounter   Procedures    MRI BRAIN WO CONTRAST     Standing Status:   Future     Standing Expiration Date:   11/11/2023    Influenza, FLUAD, (age 72 y+), IM, Preservative Free, 0.5 mL    CBC     Standing Status:   Future     Number of Occurrences:   1     Standing Expiration Date:   11/11/2023    Comprehensive Metabolic Panel     Standing Status:   Future     Number of Occurrences:   1     Standing Expiration Date:   11/11/2023    TSH with Reflex     Standing Status:   Future     Number of Occurrences:   1     Standing Expiration Date:   11/11/2023    Lipid Panel     Standing Status:   Future     Number of Occurrences:   1     Standing Expiration Date:   11/11/2023     Order Specific Question:   Is Patient Fasting?/# of Hours     Answer:   10    Urinalysis Standing Status:   Future     Number of Occurrences:   1     Standing Expiration Date:   11/11/2023    Vitamin B12 & Folate     Standing Status:   Future     Number of Occurrences:   1     Standing Expiration Date:   11/11/2023     Current Outpatient Medications   Medication Sig Dispense Refill    Tdap (ADACEL) 5-2-15.5 LF-MCG/0.5 injection Inject 0.5 mLs into the muscle once for 1 dose 0.5 mL 0    cephALEXin (KEFLEX) 500 MG capsule Take 1 capsule by mouth 3 times daily for 14 days 42 capsule 0     No current facility-administered medications for this visit. Return in about 4 weeks (around 12/9/2022). An After Visit Summary was printed and given to the patient. Documentation was done using voice recognition dragon software. Every effort was made to ensure accuracy; however, inadvertent  Unintentional computerized transcription errors may be present.

## 2022-11-13 NOTE — RESULT ENCOUNTER NOTE
Cholesterol is elevated. I see she could not tolerate Lipitor in the past.  She may try low intensity cholesterol medicine like pravastatin 20 mg/day. Minor other lab abnormalities. Oxalate crystals in urine. Encourage hydration to avoid future renal stone. Small protein in urine. I recommend to start only low-dose losartan 25 mg/day.

## 2022-11-14 RX ORDER — MAGNESIUM OXIDE 400 MG/1
400 TABLET ORAL DAILY PRN
COMMUNITY
Start: 2022-11-14

## 2022-11-16 DIAGNOSIS — L03.116 CELLULITIS OF LEFT LOWER EXTREMITY: Primary | ICD-10-CM

## 2022-11-16 NOTE — PROGRESS NOTES
Pt stopped by office and stated that her left leg is still red and painful--she has 5 more days of antibiotic and has f/u appt on Tuesday. Dr. Isabel Gomez ordered venous doppler.

## 2022-11-17 ENCOUNTER — HOSPITAL ENCOUNTER (OUTPATIENT)
Dept: VASCULAR LAB | Age: 87
Discharge: HOME | End: 2022-11-17
Payer: MEDICARE

## 2022-11-17 DIAGNOSIS — L03.116 CELLULITIS OF LEFT LOWER EXTREMITY: ICD-10-CM

## 2022-11-17 PROCEDURE — 93971 EXTREMITY STUDY: CPT

## 2022-11-22 ENCOUNTER — OFFICE VISIT (OUTPATIENT)
Dept: INTERNAL MEDICINE CLINIC | Age: 87
End: 2022-11-22
Payer: MEDICARE

## 2022-11-22 VITALS
BODY MASS INDEX: 18 KG/M2 | HEIGHT: 66 IN | DIASTOLIC BLOOD PRESSURE: 88 MMHG | OXYGEN SATURATION: 100 % | WEIGHT: 112 LBS | SYSTOLIC BLOOD PRESSURE: 132 MMHG | HEART RATE: 66 BPM

## 2022-11-22 DIAGNOSIS — I89.0 LYMPHEDEMA: ICD-10-CM

## 2022-11-22 DIAGNOSIS — L03.116 CELLULITIS OF LEFT LOWER EXTREMITY: Primary | ICD-10-CM

## 2022-11-22 DIAGNOSIS — L97.929 ULCER OF LEFT LOWER EXTREMITY, UNSPECIFIED ULCER STAGE (HCC): ICD-10-CM

## 2022-11-22 PROCEDURE — 1123F ACP DISCUSS/DSCN MKR DOCD: CPT | Performed by: INTERNAL MEDICINE

## 2022-11-22 PROCEDURE — 99213 OFFICE O/P EST LOW 20 MIN: CPT | Performed by: INTERNAL MEDICINE

## 2022-11-22 RX ORDER — DOXYCYCLINE HYCLATE 100 MG
100 TABLET ORAL 2 TIMES DAILY
Qty: 20 TABLET | Refills: 0 | Status: SHIPPED | OUTPATIENT
Start: 2022-11-22 | End: 2022-12-02

## 2022-11-22 ASSESSMENT — ENCOUNTER SYMPTOMS
VOMITING: 0
ABDOMINAL PAIN: 0
NAUSEA: 0
WHEEZING: 0
PHOTOPHOBIA: 0
SHORTNESS OF BREATH: 0

## 2022-11-22 NOTE — PROGRESS NOTES
Timo Villarreal  1935  female  81 y.o. SUBJECTIVE:       Chief Complaint   Patient presents with    Cellulitis     Left lower leg. Will finish antibiotic today, less red, but still painful to touch    Weight Loss     7 #--eat because it's mealtime--no appetite       HPI:  Follow-up visit. Despite taking prolonged course of Keflex patient continued to complain of pain swelling redness at the left lower leg. There is open area almost healed. Patient denies fever chills or systemic symptoms. Past Medical History:   Diagnosis Date    Anemia     Angina at rest [facility])     Anxiety     Back pain     CAD (coronary artery disease)     Clostridium difficile diarrhea 2016    Heart attack ([facility] [address]     History of uterine suspension procedure     Hyperlipidemia     Hypertension     Pain     back    Urinary incontinence      Past Surgical History:   Procedure Laterality Date    APPENDECTOMY      BLADDER SURGERY      COLONOSCOPY      CYSTOCELE REPAIR      CYSTOSCOPY      HYSTERECTOMY, VAGINAL  2016    vagianl hysterectomy, right salpingoophorectomy, anterior and posterior repair, paravaginal repair, uterosacral suspension, cystoscopy, transvaginal tape sling insertion    OTHER SURGICAL HISTORY  2017    SLING REMOVAL/RELEASE, AND [employer] FIT TRANSVAGINAL    RECTOCELE REPAIR      TONSILLECTOMY      UTERINE SUSPENSION      WRIST FRACTURE SURGERY Left      Social History     Socioeconomic History    Marital status:       Spouse name: None    Number of children: None    Years of education: None    Highest education level: Some college, no degree   Occupational History    Occupation: [employer]     Employer: RETIRED     Comment: didn't finish nursing school   Tobacco Use    Smoking status: Former     Packs/day: 1.00     Years: 15.00     Pack years: 15.00     Types: Cigarettes     Quit date: 2006     Years since quittin.1     Passive exposure: Past    Smokeless tobacco: Never   Vaping Use Vaping Use: Never used   Substance and Sexual Activity    Alcohol use: No    Drug use: No     Social Determinants of Health     Financial Resource Strain: Low Risk     Difficulty of Paying Living Expenses: Not hard at all   Food Insecurity: No Food Insecurity    Worried About Running Out of Food in the Last Year: Never true    Ran Out of Food in the Last Year: Never true     Family History   Problem Relation Age of Onset    Arthritis Mother     Heart Disease Mother     Hearing Loss Mother     Arthritis Father     Cancer Father     Hearing Loss Father     High Cholesterol Father     Dementia Father     Stroke Maternal Grandmother        Review of Systems   Eyes:  Negative for photophobia and visual disturbance. Respiratory:  Negative for shortness of breath and wheezing. Cardiovascular:  Negative for chest pain and palpitations. Gastrointestinal:  Negative for abdominal pain, nausea and vomiting. Neurological:  Negative for dizziness and light-headedness. OBJECTIVE:  Pulse Readings from Last 4 Encounters:   11/22/22 66   11/11/22 95   07/13/22 59   04/11/22 73     Wt Readings from Last 4 Encounters:   11/22/22 112 lb (50.8 kg)   04/11/22 119 lb 8 oz (54.2 kg)   04/05/22 119 lb 8 oz (54.2 kg)   11/19/21 117 lb (53.1 kg)     BP Readings from Last 4 Encounters:   11/22/22 132/88   11/11/22 132/84   07/13/22 130/76   04/11/22 122/76     Physical Exam  Vitals and nursing note reviewed. Constitutional:       Appearance: She is not ill-appearing. Cardiovascular:      Rate and Rhythm: Normal rate and regular rhythm. Pulses: Normal pulses. Heart sounds: Normal heart sounds. Pulmonary:      Effort: Pulmonary effort is normal.      Breath sounds: Normal breath sounds. Musculoskeletal:      Comments: Examination of the left leg  Patient continued to have pain swelling redness at the lower half of the left leg. Open wound is sealed now. Neurological:      Mental Status: She is alert. CBC:   Lab Results   Component Value Date/Time    WBC 4.4 11/11/2022 11:28 AM    HGB 15.2 11/11/2022 11:28 AM    HCT 46.4 11/11/2022 11:28 AM     11/11/2022 11:28 AM     CMP:  Lab Results   Component Value Date/Time     11/11/2022 11:28 AM    K 3.8 11/11/2022 11:28 AM     11/11/2022 11:28 AM    CO2 29 11/11/2022 11:28 AM    ANIONGAP 11 11/11/2022 11:28 AM    GLUCOSE 93 11/11/2022 11:28 AM    BUN 20 11/11/2022 11:28 AM    CREATININE 0.7 11/11/2022 11:28 AM    GFRAA >60 04/05/2022 12:21 PM    CALCIUM 10.2 11/11/2022 11:28 AM    PROT 7.2 11/11/2022 11:28 AM    LABALBU 4.7 11/11/2022 11:28 AM    AGRATIO 1.9 11/11/2022 11:28 AM    BILITOT 0.6 11/11/2022 11:28 AM    ALKPHOS 78 11/11/2022 11:28 AM    ALT 13 11/11/2022 11:28 AM    AST 9 11/11/2022 11:28 AM    GLOB 2.8 10/19/2021 04:11 PM     URINALYSIS:  Lab Results   Component Value Date/Time    GLUCOSEU Negative 11/11/2022 11:28 AM    KETUA Negative 11/11/2022 11:28 AM    SPECGRAV 1.032 11/11/2022 11:28 AM    BLOODU Negative 11/11/2022 11:28 AM    PHUR 5.5 11/11/2022 11:28 AM    PROTEINU 30 11/11/2022 11:28 AM    NITRU Negative 11/11/2022 11:28 AM    LEUKOCYTESUR Negative 11/11/2022 11:28 AM    LABMICR YES 11/11/2022 11:28 AM    URINETYPE [employer] 11/11/2022 11:28 AM       MICRO/ALB:   Lab Results   Component Value Date/Time    LABMICR YES 11/11/2022 11:28 AM     LIPID:  Lab Results   Component Value Date/Time    CHOL 211 11/11/2022 11:28 AM    TRIG 62 11/11/2022 11:28 AM    HDL 59 11/11/2022 11:28 AM    LDLCALC 140 11/11/2022 11:28 AM    LABVLDL 12 11/11/2022 11:28 AM     TSH:   Lab Results   Component Value Date/Time    TSHREFLEX 2.94 11/11/2022 11:28 AM       ASSESSMENT/PLAN:    Assessment/Plan:  Yeimy Maloney was seen today for cellulitis and weight loss. Diagnoses and all orders for this visit:    Ulcer of left lower extremity, unspecified ulcer stage (HCC)  -     doxycycline hyclate (VIBRA-TABS) 100 MG tablet;  Take 1 tablet by mouth 2 times daily for 10 days  -     Lindsey Nolen MD, Vascular/Endovascular Surgery, [facility]    Cellulitis of left lower extremity  -     doxycycline hyclate (VIBRA-TABS) 100 MG tablet; Take 1 tablet by mouth 2 times daily for 10 days  -     Lindsey Nolen MD, Vascular/Endovascular Surgery, [facility]    Lymphedema  Continue leg elevation. Orders Placed This Encounter   Procedures    Brandon Martinez MD, Vascular/Endovascular Surgery, [facility]     Referral Priority:   Routine     Referral Type:   Eval and Treat     Referral Reason:   Specialty Services Required     Referred to Provider:   Celestino Alvarez MD     Requested Specialty:   Vascular Surgery     Number of Visits Requested:   1     Current Outpatient Medications   Medication Sig Dispense Refill    doxycycline hyclate (VIBRA-TABS) 100 MG tablet Take 1 tablet by mouth 2 times daily for 10 days 20 tablet 0    magnesium oxide (MAG-OX) 400 MG tablet Take 1 tablet by mouth daily as needed      cephALEXin (KEFLEX) 500 MG capsule Take 1 capsule by mouth 3 times daily for 14 days 42 capsule 0     No current facility-administered medications for this visit. Return in about 4 weeks (around 12/20/2022) for slums test, as schedule. An After Visit Summary was printed and given to the patient. Documentation was done using voice recognition dragon software. Every effort was made to ensure accuracy; however, inadvertent  Unintentional computerized transcription errors may be present.

## 2022-12-07 ENCOUNTER — OFFICE VISIT (OUTPATIENT)
Dept: VASCULAR SURGERY | Age: 87
End: 2022-12-07
Payer: MEDICARE

## 2022-12-07 VITALS
DIASTOLIC BLOOD PRESSURE: 88 MMHG | BODY MASS INDEX: 16.88 KG/M2 | SYSTOLIC BLOOD PRESSURE: 122 MMHG | WEIGHT: 105 LBS | HEIGHT: 66 IN

## 2022-12-07 DIAGNOSIS — I83.009 VENOUS ULCER (HCC): ICD-10-CM

## 2022-12-07 DIAGNOSIS — L97.909 VENOUS ULCER (HCC): ICD-10-CM

## 2022-12-07 DIAGNOSIS — I83.893 SYMPTOMATIC VARICOSE VEINS OF BOTH LOWER EXTREMITIES: Primary | ICD-10-CM

## 2022-12-07 PROCEDURE — 99204 OFFICE O/P NEW MOD 45 MIN: CPT | Performed by: SURGERY

## 2022-12-07 PROCEDURE — 1123F ACP DISCUSS/DSCN MKR DOCD: CPT | Performed by: SURGERY

## 2022-12-07 ASSESSMENT — ENCOUNTER SYMPTOMS
RESPIRATORY NEGATIVE: 1
GASTROINTESTINAL NEGATIVE: 1
COLOR CHANGE: 1
ALLERGIC/IMMUNOLOGIC NEGATIVE: 1
EYES NEGATIVE: 1
BACK PAIN: 1

## 2022-12-07 NOTE — PROGRESS NOTES
Subjective:      Patient ID: Fatimah Dumont is a 81 y.o. female. HPI Referral from Debby Chun MD for evaluation of chronic left leg swelling with painful skin changes and erythema associated with a small ulceration on the medial left calf. Patient believes a lot of her problems have been related to medication she was on and believes some of her problems have improved once she is stopped all these medications on her own. Also has noted brown discoloration on the dorsum of her right foot. Long history of varicose veins without treatment. No history of DVT/SVT. Does report an episode of bleeding from the arch of her left foot treated through the wound care center at [facility].  No previous venous interventions. Does wear off-the-shelf nonprescription bilateral compression stockings.     Past Medical History:   Diagnosis Date    Anemia     Angina at rest [facility])     Anxiety     Back pain     CAD (coronary artery disease)     Clostridium difficile diarrhea 09/2016    Heart attack ([facility] [address]     History of uterine suspension procedure     Hyperlipidemia     Hypertension     Pain     back    Urinary incontinence      Past Surgical History:   Procedure Laterality Date    APPENDECTOMY      BLADDER SURGERY      COLONOSCOPY      CYSTOCELE REPAIR      CYSTOSCOPY      HYSTERECTOMY, VAGINAL  11/17/2016    vagianl hysterectomy, right salpingoophorectomy, anterior and posterior repair, paravaginal repair, uterosacral suspension, cystoscopy, transvaginal tape sling insertion    OTHER SURGICAL HISTORY  04/06/2017    SLING REMOVAL/RELEASE, AND [employer] FIT TRANSVAGINAL    RECTOCELE REPAIR      TONSILLECTOMY      UTERINE SUSPENSION      WRIST FRACTURE SURGERY Left      Allergies   Allergen Reactions    Atorvastatin      Joint aches    Captopril      \"Any of the \"pril\" medicines I'm allergic to\"    Ciprofloxacin      Lip swelling    Codeine Other (See Comments)     Respiratory distress; \"forget how to breath, end up in ER\"    Detrol [Tolterodine Dora Arabi makes me feel awful and sick feeling\"    Hydrocodone      Respiratory distress, \"forget how to breath, end up in ER\"    Ibuprofen      I get a purplish rash     Oxycodone      Not effective    Statins      lipitor caused muscle pain    Sulfa Antibiotics Swelling     Swelling and itchy face. Tolectin [Tolmetin]      \"this also sent me to the emergency room; I do not remember why, maybe swelling and itching from head to toe. \"    Zestril [Lisinopril]      Swelling in the mouth    Metoprolol Rash     Current Outpatient Medications   Medication Sig Dispense Refill    magnesium oxide (MAG-OX) 400 MG tablet Take 1 tablet by mouth daily as needed       No current facility-administered medications for this visit. Social History     Socioeconomic History    Marital status:       Spouse name: Not on file    Number of children: Not on file    Years of education: Not on file    Highest education level: Some college, no degree   Occupational History    Occupation: [employer]     Employer: RETIRED     Comment: didn't finish nursing school   Tobacco Use    Smoking status: Former     Packs/day: 1.00     Years: 15.00     Pack years: 15.00     Types: Cigarettes     Quit date: 2006     Years since quittin.2     Passive exposure: Past    Smokeless tobacco: Never   Vaping Use    Vaping Use: Never used   Substance and Sexual Activity    Alcohol use: No    Drug use: No    Sexual activity: Not on file   Other Topics Concern    Not on file   Social History Narrative    Not on file     Social Determinants of Health     Financial Resource Strain: Low Risk     Difficulty of Paying Living Expenses: Not hard at all   Food Insecurity: No Food Insecurity    Worried About Running Out of Food in the Last Year: Never true    Ran Out of Food in the Last Year: Never true   Transportation Needs: Not on file   Physical Activity: Not on file   Stress: Not on file   Social Connections: Not on file   Intimate Partner Violence: Not on file   Housing Stability: Not on file     Family History   Problem Relation Age of Onset    Arthritis Mother     Heart Disease Mother     Hearing Loss Mother     Arthritis Father     Cancer Father     Hearing Loss Father     High Cholesterol Father     Dementia Father     Stroke Maternal Grandmother          Review of Systems   Constitutional:  Positive for unexpected weight change. HENT: Negative. Eyes: Negative. Respiratory: Negative. Cardiovascular: Negative. Gastrointestinal: Negative. Endocrine: Negative. Genitourinary: Negative. Musculoskeletal:  Positive for back pain. Skin:  Positive for color change, rash and wound. Allergic/Immunologic: Negative. Neurological: Negative. Hematological: Negative. Psychiatric/Behavioral: Negative. 15 pt ROS per this MD personally. Objective:   Physical Exam  Vitals and nursing note reviewed. Exam conducted with a chaperone present (daughter). Constitutional:       Comments: Very thin   HENT:      Head: Normocephalic and atraumatic. Right Ear: External ear normal.      Left Ear: External ear normal.      Nose: Nose normal.      Mouth/Throat:      Mouth: Mucous membranes are moist.      Pharynx: Oropharynx is clear. Eyes:      Conjunctiva/sclera: Conjunctivae normal.      Pupils: Pupils are equal, round, and reactive to light. Cardiovascular:      Rate and Rhythm: Normal rate and regular rhythm. Heart sounds: Normal heart sounds. Pulmonary:      Effort: Pulmonary effort is normal.      Comments: kyphoscoliosis  Abdominal:      General: Abdomen is flat. Bowel sounds are normal.      Palpations: Abdomen is soft. There is no mass. Hernia: No hernia is present. Musculoskeletal:      Cervical back: Normal range of motion and neck supple. Right lower leg: No edema. Left lower leg: Edema (1+) present. Skin:     General: Skin is warm and dry.       Capillary Refill: Capillary refill takes less than 2 seconds. Findings: Erythema (L paitor region) and lesion (scabbed wound medial L calf (<1 cm in size)) present. Comments: Hemosiderin dorsum R foot   Neurological:      General: No focal deficit present. Mental Status: She is alert and oriented to person, place, and time. Cranial Nerves: No cranial nerve deficit. Motor: No weakness. Gait: Gait abnormal (unsteady). Psychiatric:         Mood and Affect: Mood normal.         Behavior: Behavior normal.         Thought Content: Thought content normal.         Judgment: Judgment normal.     Pulses:   R bruit  L bruit   2   carotid 2    2   brachial 2    2   radial 2    2   femoral 2    2   popliteal 2    2   posterior tibial 2    2   dorsalis pedis 2       bypass graft             Assessment:      Chronic venous insufficiency B legs with varicose veins  Stasis changes B legs - R CEAP C4a, L CEAP C6 (active ulceration and dermatitis)      Plan:      Begin 20/30 mmHg KH compression stocking R leg. Unna boot with steroid cream L leg - weekly changes until changes resolved then stockings. Will consider more complete venous evaluation with reflux scan after L leg healed. Explained in detail to pt and daughter. Both understand and are agreeable. F/U next week at [facility] office.         Vanessa Esquivel MD

## 2022-12-07 NOTE — Clinical Note
Dr. Wilson Litter in the office today for evaluation of her left leg. She has clear evidence of bilateral chronic venous insufficiency with varicose veins and complications. We will begin treating her with an Unna boot on the left leg and compression stockings on the right. Once her problems have cleared in the left leg she will need further evaluation for the possibility of intervention. If you have any questions please feel free to contact me. I will keep you appraised of our progress and plans for the future. Thanks for asked me to see and please let me know if I can help with any other patients in the future.   Kiersten High

## 2022-12-13 ENCOUNTER — TELEPHONE (OUTPATIENT)
Dept: INTERNAL MEDICINE CLINIC | Age: 87
End: 2022-12-13

## 2022-12-13 DIAGNOSIS — R30.0 DYSURIA: Primary | ICD-10-CM

## 2022-12-13 LAB
BACTERIA: ABNORMAL /HPF
BILIRUBIN URINE: NEGATIVE
BLOOD, URINE: ABNORMAL
CLARITY: ABNORMAL
COLOR: YELLOW
COMMENT UA: ABNORMAL
CRYSTALS, UA: ABNORMAL /HPF
EPITHELIAL CELLS, UA: 3 /HPF (ref 0–5)
GLUCOSE URINE: NEGATIVE MG/DL
HYALINE CASTS: 2 /LPF (ref 0–8)
KETONES, URINE: NEGATIVE MG/DL
LEUKOCYTE ESTERASE, URINE: ABNORMAL
MICROSCOPIC EXAMINATION: YES
NITRITE, URINE: NEGATIVE
PH UA: 6 (ref 5–8)
PROTEIN UA: 30 MG/DL
RBC UA: ABNORMAL /HPF (ref 0–4)
SPECIFIC GRAVITY UA: 1.02 (ref 1–1.03)
URINE REFLEX TO CULTURE: YES
URINE TYPE: ABNORMAL
UROBILINOGEN, URINE: 0.2 E.U./DL
WBC UA: 1113 /HPF (ref 0–5)

## 2022-12-14 ENCOUNTER — OFFICE VISIT (OUTPATIENT)
Dept: VASCULAR SURGERY | Age: 87
End: 2022-12-14
Payer: MEDICARE

## 2022-12-14 VITALS — BODY MASS INDEX: 17.49 KG/M2 | HEIGHT: 65 IN | WEIGHT: 105 LBS

## 2022-12-14 DIAGNOSIS — I83.009 VENOUS ULCER (HCC): ICD-10-CM

## 2022-12-14 DIAGNOSIS — N39.0 URINARY TRACT INFECTION WITHOUT HEMATURIA, SITE UNSPECIFIED: Primary | ICD-10-CM

## 2022-12-14 DIAGNOSIS — L97.909 VENOUS ULCER (HCC): ICD-10-CM

## 2022-12-14 DIAGNOSIS — I83.893 SYMPTOMATIC VARICOSE VEINS OF BOTH LOWER EXTREMITIES: Primary | ICD-10-CM

## 2022-12-14 PROCEDURE — 1123F ACP DISCUSS/DSCN MKR DOCD: CPT | Performed by: SURGERY

## 2022-12-14 PROCEDURE — 99212 OFFICE O/P EST SF 10 MIN: CPT | Performed by: SURGERY

## 2022-12-14 RX ORDER — CEFUROXIME AXETIL 250 MG/1
250 TABLET ORAL 2 TIMES DAILY
Qty: 20 TABLET | Refills: 0 | Status: SHIPPED | OUTPATIENT
Start: 2022-12-14 | End: 2022-12-24

## 2022-12-14 NOTE — RESULT ENCOUNTER NOTE
Patient have a urinary tract infection. While awaiting culture report start antibiotic. Order sent to the pharmacy.

## 2022-12-14 NOTE — PROGRESS NOTES
Seen for L leg venous stasis changes and small ulceration. Tolerated wrap this week without slippage. Feels better overall. EXAM:  Edema improved    Decreased gaitor erythema and induration    Medial calf wound remains but jus    All VVs soft, nontender    A/P: Chronic venous insufficiency B legs with varicose veins  Venous stasis changes B legs - R CEAP C4a, L CEAP C6 (active ulceration and dermatitis)  Improved with compression and steroid cream  Unna boot today with topical steroid cream  F/U one week - possible stocking fitting. Eventual venous reflux scan.

## 2022-12-16 ENCOUNTER — TELEPHONE (OUTPATIENT)
Dept: INTERNAL MEDICINE CLINIC | Age: 87
End: 2022-12-16

## 2022-12-16 LAB
ORGANISM: ABNORMAL
URINE CULTURE, ROUTINE: ABNORMAL

## 2022-12-21 ENCOUNTER — OFFICE VISIT (OUTPATIENT)
Dept: VASCULAR SURGERY | Age: 87
End: 2022-12-21
Payer: MEDICARE

## 2022-12-21 DIAGNOSIS — I83.893 SYMPTOMATIC VARICOSE VEINS OF BOTH LOWER EXTREMITIES: ICD-10-CM

## 2022-12-21 DIAGNOSIS — L97.909 VENOUS ULCER (HCC): Primary | ICD-10-CM

## 2022-12-21 DIAGNOSIS — I83.009 VENOUS ULCER (HCC): Primary | ICD-10-CM

## 2022-12-21 PROCEDURE — 99212 OFFICE O/P EST SF 10 MIN: CPT | Performed by: SURGERY

## 2022-12-21 PROCEDURE — 1123F ACP DISCUSS/DSCN MKR DOCD: CPT | Performed by: SURGERY

## 2023-01-06 ENCOUNTER — TELEPHONE (OUTPATIENT)
Dept: INTERNAL MEDICINE CLINIC | Age: 88
End: 2023-01-06

## 2023-01-06 NOTE — TELEPHONE ENCOUNTER
Pt came in asking about her urine culture advised jessica notes she stated still having mild symptoms of a UTI .  Please advise thank you

## 2023-01-06 NOTE — TELEPHONE ENCOUNTER
Urine cx returned on 12/16/22. Patient still having some mild dysuria with slightly cloudy urine - symptoms are very mild, not concerning to patient and she stated she does not need this addressed by covering provider at this time - PCP out of office - would be forwarded to on-call physician. Patient was advised to drink more fluids over the next few days - she has not been drinking many fluids - and she was advised to call us back on Monday if she is still having mild symptoms for further advice/treatment.

## 2023-01-26 ENCOUNTER — OFFICE VISIT (OUTPATIENT)
Dept: INTERNAL MEDICINE CLINIC | Age: 88
End: 2023-01-26

## 2023-01-26 VITALS
DIASTOLIC BLOOD PRESSURE: 76 MMHG | HEIGHT: 65 IN | WEIGHT: 112 LBS | OXYGEN SATURATION: 98 % | BODY MASS INDEX: 18.66 KG/M2 | SYSTOLIC BLOOD PRESSURE: 120 MMHG | HEART RATE: 95 BPM

## 2023-01-26 DIAGNOSIS — I10 WHITE COAT SYNDROME WITH HYPERTENSION: ICD-10-CM

## 2023-01-26 DIAGNOSIS — L97.909 VENOUS ULCER (HCC): ICD-10-CM

## 2023-01-26 DIAGNOSIS — I83.009 VENOUS ULCER (HCC): ICD-10-CM

## 2023-01-26 DIAGNOSIS — Z00.00 INITIAL MEDICARE ANNUAL WELLNESS VISIT: Primary | ICD-10-CM

## 2023-01-26 DIAGNOSIS — H91.93 HEARING DEFICIT, BILATERAL: ICD-10-CM

## 2023-01-26 DIAGNOSIS — R41.89 COGNITIVE IMPAIRMENT: ICD-10-CM

## 2023-01-26 DIAGNOSIS — R41.89 COGNITIVE IMPAIRMENT: Primary | ICD-10-CM

## 2023-01-26 DIAGNOSIS — I83.93 VARICOSE VEINS OF BOTH LOWER EXTREMITIES, UNSPECIFIED WHETHER COMPLICATED: ICD-10-CM

## 2023-01-26 RX ORDER — LANOLIN ALCOHOL/MO/W.PET/CERES
400 CREAM (GRAM) TOPICAL EVERY OTHER DAY
COMMUNITY

## 2023-01-26 RX ORDER — DONEPEZIL HYDROCHLORIDE 5 MG/1
5 TABLET, FILM COATED ORAL NIGHTLY
Qty: 30 TABLET | Refills: 2 | Status: SHIPPED | OUTPATIENT
Start: 2023-01-26

## 2023-01-26 RX ORDER — ACETAMINOPHEN 160 MG
TABLET,DISINTEGRATING ORAL
COMMUNITY

## 2023-01-26 ASSESSMENT — ENCOUNTER SYMPTOMS
SHORTNESS OF BREATH: 0
CHEST TIGHTNESS: 0
VOICE CHANGE: 0
WHEEZING: 0
TROUBLE SWALLOWING: 0

## 2023-01-26 ASSESSMENT — PATIENT HEALTH QUESTIONNAIRE - PHQ9
SUM OF ALL RESPONSES TO PHQ QUESTIONS 1-9: 0
SUM OF ALL RESPONSES TO PHQ QUESTIONS 1-9: 0
1. LITTLE INTEREST OR PLEASURE IN DOING THINGS: 0
SUM OF ALL RESPONSES TO PHQ QUESTIONS 1-9: 0
2. FEELING DOWN, DEPRESSED OR HOPELESS: 0
SUM OF ALL RESPONSES TO PHQ QUESTIONS 1-9: 0
SUM OF ALL RESPONSES TO PHQ9 QUESTIONS 1 & 2: 0

## 2023-01-26 ASSESSMENT — LIFESTYLE VARIABLES
HOW OFTEN DO YOU HAVE A DRINK CONTAINING ALCOHOL: NEVER
HOW MANY STANDARD DRINKS CONTAINING ALCOHOL DO YOU HAVE ON A TYPICAL DAY: PATIENT DOES NOT DRINK

## 2023-01-26 NOTE — PROGRESS NOTES
Medicare Annual Wellness Visit    Pinky Le is here for [facility] AWV    Assessment & Plan   Initial Medicare annual wellness visit  Hearing deficit, bilateral  -     [facility] Er[facility]. ULICES, Audiology, [facility]      Recommendations for Preventive Services Due: see orders and patient instructions/AVS.  Recommended screening schedule for the next 5-10 years is provided to the patient in written form: see Patient Instructions/AVS.     No follow-ups on file. Subjective   The following acute and/or chronic problems were also addressed today:  Patient has no current healthcare gaps. Patient's complete Health Risk Assessment and screening values have been reviewed and are found in Flowsheets. The following problems were reviewed today and where indicated follow up appointments were made and/or referrals ordered. Positive Risk Factor Screenings with Interventions:    Fall Risk:  Do you feel unsteady or are you worried about falling? : (!) yes  2 or more falls in past year?: no  Fall with injury in past year?: no     Interventions:    Patient sometimes gets dizzy when she looks certain directions, given vertigo exercises to try and alleviate. If does not helped instructed to talk with PCP. Patient also encouraged to make appt with in office Physical Therapist to evaluate to see if she needs help with weakness.                Weight and Activity:  Physical Activity: Inactive    Days of Exercise per Week: 0 days    Minutes of Exercise per Session: 0 min     On average, how many days per week do you engage in moderate to strenuous exercise (like a brisk walk)?: 0 days  Have you lost any weight without trying in the past 3 months?: No         Inactivity Interventions:  See AVS for additional education material      Dentist Screen:  Have you seen the dentist within the past year?: (!) No (top dentures)    Intervention:  Advised to schedule with their dentist    Hearing Screen:  Do you or your family notice any trouble with your hearing that hasn't been managed with hearing aids?: (!) Yes    Interventions:  Referred to Audiology     Safety:  Do you have any tripping hazards - loose or unsecured carpets or rugs?: (!) Yes  Interventions:  See AVS for additional education material, only has rugs in bathroom is going to get something to secure down. If patient is unable to get rugs secured patient is encouraged to remove. Objective   There were no vitals filed for this visit. There is no height or weight on file to calculate BMI. Allergies   Allergen Reactions    Atorvastatin      Joint aches    Captopril      \"Any of the \"pril\" medicines I'm allergic to\"    Ciprofloxacin      Lip swelling    Codeine Other (See Comments)     Respiratory distress; \"forget how to breath, end up in ER\"    Detrol [Tolterodine Tartrate]      \"just makes me feel awful and sick feeling\"    Hydrocodone      Respiratory distress, \"forget how to breath, end up in ER\"    Ibuprofen      I get a purplish rash     Oxycodone      Not effective    Statins      lipitor caused muscle pain    Sulfa Antibiotics Swelling     Swelling and itchy face. Tolectin [Tolmetin]      \"this also sent me to the emergency room; I do not remember why, maybe swelling and itching from head to toe. \"    Zestril [Lisinopril]      Swelling in the mouth    Metoprolol Rash     Prior to Visit Medications    Medication Sig Taking?  Authorizing Provider   folic acid (FOLVITE) 662 MCG tablet Take 400 mcg by mouth every other day  Historical Provider, MD   Coenzyme Q10 (COQ10 PO) Take by mouth  Historical Provider, MD   Omega-3 Fatty Acids (FISH OIL EXTRA STRENGTH) 1200 MG CAPS Take by mouth  Historical Provider, MD   donepezil (ARICEPT) 5 MG tablet Take 1 tablet by mouth nightly  Margarita Bravo MD   Cholecalciferol (VITAMIN D3) 50 MCG (2000 UT) CAPS Take by mouth  Historical Provider, MD   magnesium oxide (MAG-OX) 400 MG tablet Take 400 mg by mouth daily as needed Leg cramps  Hiwot Meneses MD       [facility] (Including outside providers/suppliers regularly involved in providing care):   Patient Care Team:  Hiwot Meneses MD as PCP - General (Internal Medicine)  Hiwot Meneses MD as PCP - [facility] Empaneled Provider  Tiago Deluna MD as Consulting Physician (Otolaryngology)     Reviewed and updated this visit:  Tobacco  Allergies  Meds  Med Hx  Surg Hx  Soc Hx  Fam Hx             This encounter was performed under my, Erum Guajardo, direct supervision, 1/26/2023.

## 2023-01-26 NOTE — PROGRESS NOTES
Blanca Ch  1935  female  81 y.o. SUBJECTIVE:       Chief Complaint   Patient presents with    Dementia     SLUMS 23/30    Other     Denies urinary issues. Daughter here        HPI:  Follow-up visit. History of whitecoat hypertension. In the past she could not tolerate antihypertensive. Patient continue to complain of forgetfulness. Today she got slums score of 23. She have not done MRI of the brain yet. Since last visit patient has been evaluated by vascular surgeon for bilateral varicose veins. Her bilateral redness of the feet and legs gradually getting better. Past Medical History:   Diagnosis Date    Anemia     Angina at rest [facility])     Anxiety     Back pain     CAD (coronary artery disease)     Clostridium difficile diarrhea 09/2016    Heart attack ([facility] [address]     History of uterine suspension procedure     Hyperlipidemia     Hypertension     Pain     back    Urinary incontinence      Past Surgical History:   Procedure Laterality Date    APPENDECTOMY      BLADDER SURGERY      COLONOSCOPY      CYSTOCELE REPAIR      CYSTOSCOPY      HYSTERECTOMY, VAGINAL  11/17/2016    vagianl hysterectomy, right salpingoophorectomy, anterior and posterior repair, paravaginal repair, uterosacral suspension, cystoscopy, transvaginal tape sling insertion    OTHER SURGICAL HISTORY  04/06/2017    SLING REMOVAL/RELEASE, AND [email or web address] [facility] FIT TRANSVAGINAL    RECTOCELE REPAIR      TONSILLECTOMY      UTERINE SUSPENSION      WRIST FRACTURE SURGERY Left      Social History     Socioeconomic History    Marital status:       Spouse name: None    Number of children: None    Years of education: None    Highest education level: Some college, no degree   Occupational History    Occupation: [employer]     Employer: RETIRED     Comment: didn't finish nursing school   Tobacco Use    Smoking status: Former     Packs/day: 1.00     Years: 15.00     Pack years: 15.00     Types: Cigarettes     Quit date: 9/19/2006     Years since quittin.3     Passive exposure: Past    Smokeless tobacco: Never   Vaping Use    Vaping Use: Never used   Substance and Sexual Activity    Alcohol use: No    Drug use: No     Social Determinants of Health     Financial Resource Strain: Low Risk     Difficulty of Paying Living Expenses: Not hard at all   Food Insecurity: No Food Insecurity    Worried About Running Out of Food in the Last Year: Never true    Ran Out of Food in the Last Year: Never true     Family History   Problem Relation Age of Onset    Arthritis Mother     Heart Disease Mother     Hearing Loss Mother     Arthritis Father     Cancer Father     Hearing Loss Father     High Cholesterol Father     Dementia Father     Stroke Maternal Grandmother        Review of Systems   Constitutional:  Negative for diaphoresis and unexpected weight change. HENT:  Negative for trouble swallowing and voice change. Respiratory:  Negative for chest tightness, shortness of breath and wheezing. Cardiovascular:  Negative for chest pain and palpitations. Neurological:  Negative for dizziness and light-headedness. OBJECTIVE:  Pulse Readings from Last 4 Encounters:   23 95   22 66   22 95   22 59     Wt Readings from Last 4 Encounters:   23 112 lb (50.8 kg)   22 105 lb (47.6 kg)   22 105 lb (47.6 kg)   22 112 lb (50.8 kg)     BP Readings from Last 4 Encounters:   23 120/76   22 122/88   22 132/88   22 132/84     Physical Exam  Vitals and nursing note reviewed. Constitutional:       Appearance: Normal appearance. Comments: Thin built. Eyes:      Conjunctiva/sclera: Conjunctivae normal.   Cardiovascular:      Rate and Rhythm: Normal rate and regular rhythm. Pulses: Normal pulses. Heart sounds: Normal heart sounds. Pulmonary:      Effort: Pulmonary effort is normal.      Breath sounds: Normal breath sounds. No wheezing or rhonchi.    Musculoskeletal:         General: No swelling. Comments: Continue to have generalized mild redness affecting both feet and lower leg. No open ulcer. Neurological:      General: No focal deficit present. Mental Status: She is alert and oriented to person, place, and time. Mental status is at baseline.       Comments: Slums score 23   Psychiatric:         Mood and Affect: Mood normal.         Behavior: Behavior normal.       CBC:   Lab Results   Component Value Date/Time    WBC 4.4 11/11/2022 11:28 AM    HGB 15.2 11/11/2022 11:28 AM    HCT 46.4 11/11/2022 11:28 AM     11/11/2022 11:28 AM     CMP:  Lab Results   Component Value Date/Time     11/11/2022 11:28 AM    K 3.8 11/11/2022 11:28 AM     11/11/2022 11:28 AM    CO2 29 11/11/2022 11:28 AM    ANIONGAP 11 11/11/2022 11:28 AM    GLUCOSE 93 11/11/2022 11:28 AM    BUN 20 11/11/2022 11:28 AM    CREATININE 0.7 11/11/2022 11:28 AM    GFRAA >60 04/05/2022 12:21 PM    CALCIUM 10.2 11/11/2022 11:28 AM    PROT 7.2 11/11/2022 11:28 AM    LABALBU 4.7 11/11/2022 11:28 AM    AGRATIO 1.9 11/11/2022 11:28 AM    BILITOT 0.6 11/11/2022 11:28 AM    ALKPHOS 78 11/11/2022 11:28 AM    ALT 13 11/11/2022 11:28 AM    AST 9 11/11/2022 11:28 AM    GLOB 2.8 10/19/2021 04:11 PM     URINALYSIS:  Lab Results   Component Value Date/Time    GLUCOSEU Negative 12/13/2022 12:06 PM    KETUA Negative 12/13/2022 12:06 PM    SPECGRAV 1.024 12/13/2022 12:06 PM    BLOODU SMALL 12/13/2022 12:06 PM    PHUR 6.0 12/13/2022 12:06 PM    PROTEINU 30 12/13/2022 12:06 PM    NITRU Negative 12/13/2022 12:06 PM    LEUKOCYTESUR LARGE 12/13/2022 12:06 PM    LABMICR YES 12/13/2022 12:06 PM    URINETYPE [employer] 12/13/2022 12:06 PM     MICRO/ALB:   Lab Results   Component Value Date/Time    LABMICR YES 12/13/2022 12:06 PM     LIPID:  Lab Results   Component Value Date/Time    CHOL 211 11/11/2022 11:28 AM    TRIG 62 11/11/2022 11:28 AM    HDL 59 11/11/2022 11:28 AM    LDLCALC 140 11/11/2022 11:28 AM    LABVLDL 12 11/11/2022 11:28 AM     TSH:   Lab Results   Component Value Date/Time    TSHREFLEX 2.94 11/11/2022 11:28 AM         ASSESSMENT/PLAN:  Assessment/Plan:  Lolita Melgar was seen today for dementia and other. Diagnoses and all orders for this visit:    Cognitive impairment  SLUMS score 23  -     donepezil (ARICEPT) 5 MG tablet; Take 1 tablet by mouth nightly  Call me in a month about the response to current dose of Aricept. May increase dose of Aricept. Patient will get MRI of the brain as advised. White coat syndrome with hypertension  Nonpharmacologic management. Varicose veins of both lower extremities, unspecified whether complicated  Continue to follow-up with vascular surgeon. Currently on [employer] Venous ulcer ([facility] [address]  No active venous ulcer at present. No orders of the defined types were placed in this encounter. Current Outpatient Medications   Medication Sig Dispense Refill    folic acid (FOLVITE) 982 MCG tablet Take 400 mcg by mouth every other day      Coenzyme Q10 (COQ10 PO) Take by mouth      Omega-3 Fatty Acids (FISH OIL EXTRA STRENGTH) 1200 MG CAPS Take by mouth      donepezil (ARICEPT) 5 MG tablet Take 1 tablet by mouth nightly 30 tablet 2    magnesium oxide (MAG-OX) 400 MG tablet Take 400 mg by mouth daily as needed Leg cramps       No current facility-administered medications for this visit. Return in about 3 months (around 4/26/2023). An After Visit Summary was printed and given to the patient. Documentation was done using voice recognition dragon software. Every effort was made to ensure accuracy; however, inadvertent  Unintentional computerized transcription errors may be present.

## 2023-01-26 NOTE — PATIENT INSTRUCTIONS
Personalized Preventive Plan for Gina Adams - 1/26/2023  Medicare offers a range of preventive health benefits. Some of the tests and screenings are paid in full while other may be subject to a deductible, co-insurance, and/or copay. Some of these benefits include a comprehensive review of your medical history including lifestyle, illnesses that may run in your family, and various assessments and screenings as appropriate. After reviewing your medical record and screening and assessments performed today your provider may have ordered immunizations, labs, imaging, and/or referrals for you. A list of these orders (if applicable) as well as your Preventive Care list are included within your After Visit Summary for your review. Other Preventive Recommendations:    A preventive eye exam performed by an eye specialist is recommended every 1-2 years to screen for glaucoma; cataracts, macular degeneration, and other eye disorders. A preventive dental visit is recommended every 6 months. Try to get at least 150 minutes of exercise per week or 10,000 steps per day on a pedometer . Order or download the FREE \"Exercise & Physical Activity: Your Everyday Guide\" from [employer]. Call 6-495.866.7700 or search [employer] online. You need 5676-2837 mg of calcium and 6619-6478 IU of vitamin D per day. It is possible to meet your calcium requirement with diet alone, but a vitamin D supplement is usually necessary to meet this goal.  When exposed to the sun, use a sunscreen that protects against both UVA and UVB radiation with an SPF of 30 or greater. Reapply every 2 to 3 hours or after sweating, drying off with a towel, or swimming. Always wear a seat belt when traveling in a car. Always wear a helmet when riding a bicycle or motorcycle.

## 2023-01-27 RX ORDER — DONEPEZIL HYDROCHLORIDE 5 MG/1
TABLET, FILM COATED ORAL
Qty: 90 TABLET | OUTPATIENT
Start: 2023-01-27

## 2023-02-01 ENCOUNTER — OFFICE VISIT (OUTPATIENT)
Dept: VASCULAR SURGERY | Age: 88
End: 2023-02-01
Payer: MEDICARE

## 2023-02-01 VITALS — BODY MASS INDEX: 18.66 KG/M2 | WEIGHT: 112 LBS | HEIGHT: 65 IN

## 2023-02-01 DIAGNOSIS — L97.909 VENOUS ULCER (HCC): ICD-10-CM

## 2023-02-01 DIAGNOSIS — I83.893 SYMPTOMATIC VARICOSE VEINS OF BOTH LOWER EXTREMITIES: Primary | ICD-10-CM

## 2023-02-01 DIAGNOSIS — I83.009 VENOUS ULCER (HCC): ICD-10-CM

## 2023-02-01 PROCEDURE — 1123F ACP DISCUSS/DSCN MKR DOCD: CPT | Performed by: SURGERY

## 2023-02-01 PROCEDURE — 99212 OFFICE O/P EST SF 10 MIN: CPT | Performed by: SURGERY

## 2023-02-01 NOTE — PROGRESS NOTES
Seen for L leg venous stasis changes and small ulceration. Begun on compression stockings 6 weeks ago and has been very compliant wearing daily and elevating. Feels better overall. EXAM:  Edema controlled. Gaitor erythema and induration controlled    Medial calf wound remains healed    All VVs soft, nontender    A/P: Chronic venous insufficiency B legs with varicose veins  Venous stasis changes B legs - R CEAP C4a, L CEAP C5 (healed ulceration and dermatitis) - controlled  Improved with compression and steroid cream  Continue stocking use daily. Elevate when not walking. Elevate FOB. F/U 3 months. Consider venous duplex and intervention should she fail this approach. Pt and daughter understand and agree.

## 2023-02-01 NOTE — Clinical Note
Dr. Kylie Rudolph,  I saw Robles Garcia back in the office today. I am happy to say that her venous stasis is well controlled as she remains compliant with her compression stockings. It is my recommendation that we stay conservative on her but if she fails she may need further work-up and intervention. We will keep you appraised as our plan develops in the future. If you have any questions please will free to contact me. Thanks Brandon Rahman please him know if he can help you with any other patients in the future.   Maddie Petty

## 2023-02-20 ENCOUNTER — TELEPHONE (OUTPATIENT)
Dept: INTERNAL MEDICINE CLINIC | Age: 88
End: 2023-02-20

## 2023-02-20 ENCOUNTER — OFFICE VISIT (OUTPATIENT)
Dept: INTERNAL MEDICINE CLINIC | Age: 88
End: 2023-02-20

## 2023-02-20 VITALS
BODY MASS INDEX: 18.64 KG/M2 | HEART RATE: 100 BPM | SYSTOLIC BLOOD PRESSURE: 138 MMHG | DIASTOLIC BLOOD PRESSURE: 88 MMHG | WEIGHT: 112 LBS | OXYGEN SATURATION: 97 %

## 2023-02-20 DIAGNOSIS — N39.0 URINARY TRACT INFECTION WITHOUT HEMATURIA, SITE UNSPECIFIED: Primary | ICD-10-CM

## 2023-02-20 DIAGNOSIS — R41.89 COGNITIVE IMPAIRMENT: ICD-10-CM

## 2023-02-20 LAB
BILIRUBIN, POC: ABNORMAL
BLOOD URINE, POC: ABNORMAL
CLARITY, POC: ABNORMAL
COLOR, POC: YELLOW
GLUCOSE URINE, POC: ABNORMAL
KETONES, POC: ABNORMAL
LEUKOCYTE EST, POC: ABNORMAL
NITRITE, POC: ABNORMAL
PH, POC: 7
PROTEIN, POC: ABNORMAL
SPECIFIC GRAVITY, POC: >=1.03
UROBILINOGEN, POC: 0.2

## 2023-02-20 RX ORDER — NITROFURANTOIN 25; 75 MG/1; MG/1
100 CAPSULE ORAL 2 TIMES DAILY
Qty: 14 CAPSULE | Refills: 0 | Status: SHIPPED | OUTPATIENT
Start: 2023-02-20 | End: 2023-02-27

## 2023-02-20 RX ORDER — MEMANTINE HYDROCHLORIDE 5 MG/1
5 TABLET ORAL DAILY
Qty: 30 TABLET | Refills: 0 | Status: SHIPPED | OUTPATIENT
Start: 2023-02-20

## 2023-02-20 SDOH — ECONOMIC STABILITY: HOUSING INSECURITY
IN THE LAST 12 MONTHS, WAS THERE A TIME WHEN YOU DID NOT HAVE A STEADY PLACE TO SLEEP OR SLEPT IN A SHELTER (INCLUDING NOW)?: NO

## 2023-02-20 SDOH — ECONOMIC STABILITY: FOOD INSECURITY: WITHIN THE PAST 12 MONTHS, THE FOOD YOU BOUGHT JUST DIDN'T LAST AND YOU DIDN'T HAVE MONEY TO GET MORE.: NEVER TRUE

## 2023-02-20 SDOH — ECONOMIC STABILITY: FOOD INSECURITY: WITHIN THE PAST 12 MONTHS, YOU WORRIED THAT YOUR FOOD WOULD RUN OUT BEFORE YOU GOT MONEY TO BUY MORE.: NEVER TRUE

## 2023-02-20 SDOH — ECONOMIC STABILITY: INCOME INSECURITY: HOW HARD IS IT FOR YOU TO PAY FOR THE VERY BASICS LIKE FOOD, HOUSING, MEDICAL CARE, AND HEATING?: NOT HARD AT ALL

## 2023-02-20 ASSESSMENT — ENCOUNTER SYMPTOMS
SHORTNESS OF BREATH: 0
WHEEZING: 0
PHOTOPHOBIA: 0
VOMITING: 0
NAUSEA: 0
ABDOMINAL PAIN: 0

## 2023-02-20 NOTE — PROGRESS NOTES
Subjective:      Chief Complaint   Patient presents with    Urinary Tract Infection       Patient ID: Timo Villarreal is a 81 y.o. female. HPI  Patient walked into our office. Complaining of urinary tract symptoms of burning frequency urgency for the last 7 days. She report she had UTI in the past and feels similar symptoms. Patient denies fever, chills, nausea, vomiting, flank pain. She just had slight discomfort over the suprapubic area. Review of Systems   Constitutional:  Negative for appetite change, fatigue, fever and unexpected weight change. Eyes:  Negative for photophobia and visual disturbance. Respiratory:  Negative for shortness of breath and wheezing. Cardiovascular:  Negative for chest pain and palpitations. Gastrointestinal:  Negative for abdominal pain, nausea and vomiting. Genitourinary:  Positive for dysuria, frequency and urgency. Negative for flank pain and hematuria. Neurological:  Negative for light-headedness and headaches. Vitals:    02/20/23 1155   BP: 138/88   Site: Left Upper Arm   Position: Sitting   Cuff Size: Child   Pulse: 100   SpO2: 97%   Weight: 112 lb (50.8 kg)       Objective:   Physical Exam  Vitals and nursing note reviewed. Constitutional:       General: She is not in acute distress. Appearance: Normal appearance. She is not ill-appearing or diaphoretic. Cardiovascular:      Rate and Rhythm: Normal rate and regular rhythm. Pulses: Normal pulses. Heart sounds: Normal heart sounds. Pulmonary:      Effort: Pulmonary effort is normal.      Breath sounds: Normal breath sounds. Abdominal:      General: Bowel sounds are normal.      Tenderness: There is no abdominal tenderness. There is no right CVA tenderness, left CVA tenderness, guarding or rebound. Comments: Slight discomfort over the suprapubic area on deep palpation. Neurological:      Mental Status: She is alert.      Assessment/Plan:  Jessica Russo was seen today for urinary tract infection. Diagnoses and all orders for this visit:    Urinary tract infection without hematuria, site unspecified  -     POCT Urinalysis no Micro  Positive nitrite and mild leukocyte esterase positive. -     Culture, Urine; Future  -     Culture, Urine  -     nitrofurantoin, macrocrystal-monohydrate, (MACROBID) 100 MG capsule; Take 1 capsule by mouth 2 times daily for 7 days    Cognitive impairment  Patient continues to complains of memory issues  Patient could not tolerate Aricept. We will try Namenda. -     memantine (NAMENDA) 5 MG tablet; Take 1 tablet by mouth daily  She will keep regular appointment as a schedule in April  An After Visit Summary was printed and given to the patient. Documentation was done using voice recognition dragon software. Every effort was made to ensure accuracy; however, inadvertent  Unintentional computerized transcription errors may be present.      Velia Preston MD

## 2023-02-22 LAB
ORGANISM: ABNORMAL
URINE CULTURE, ROUTINE: ABNORMAL

## 2023-03-28 DIAGNOSIS — R41.89 COGNITIVE IMPAIRMENT: ICD-10-CM

## 2023-03-28 RX ORDER — MEMANTINE HYDROCHLORIDE 5 MG/1
5 TABLET ORAL DAILY
Qty: 90 TABLET | Refills: 1 | Status: SHIPPED | OUTPATIENT
Start: 2023-03-28 | End: 2023-05-18

## 2023-04-30 ENCOUNTER — APPOINTMENT (OUTPATIENT)
Dept: CT IMAGING | Age: 88
DRG: 417 | End: 2023-04-30
Payer: MEDICARE

## 2023-04-30 ENCOUNTER — APPOINTMENT (OUTPATIENT)
Dept: GENERAL RADIOLOGY | Age: 88
DRG: 417 | End: 2023-04-30
Payer: MEDICARE

## 2023-04-30 ENCOUNTER — HOSPITAL ENCOUNTER (INPATIENT)
Age: 88
LOS: 3 days | Discharge: HOME HEALTH | DRG: 417 | End: 2023-05-03
Attending: EMERGENCY MEDICINE | Admitting: INTERNAL MEDICINE
Payer: MEDICARE

## 2023-04-30 DIAGNOSIS — K81.9 CHOLECYSTITIS: ICD-10-CM

## 2023-04-30 DIAGNOSIS — R41.82 ALTERED MENTAL STATUS, UNSPECIFIED ALTERED MENTAL STATUS TYPE: Primary | ICD-10-CM

## 2023-04-30 DIAGNOSIS — K81.0 ACUTE CHOLECYSTITIS: ICD-10-CM

## 2023-04-30 DIAGNOSIS — N30.00 ACUTE CYSTITIS WITHOUT HEMATURIA: ICD-10-CM

## 2023-04-30 PROBLEM — F03.90 DEMENTIA (HCC): Status: ACTIVE | Noted: 2023-04-30

## 2023-04-30 LAB
ALBUMIN SERPL-MCNC: 3.7 G/DL (ref 3.4–5)
ALBUMIN/GLOB SERPL: 1.4 {RATIO} (ref 1.1–2.2)
ALP SERPL-CCNC: 72 U/L (ref 40–129)
ALT SERPL-CCNC: 9 U/L (ref 10–40)
ANION GAP SERPL CALCULATED.3IONS-SCNC: 8 MMOL/L (ref 3–16)
AST SERPL-CCNC: 7 U/L (ref 15–37)
BACTERIA URNS QL MICRO: ABNORMAL /HPF
BASOPHILS # BLD: 0 K/UL (ref 0–0.2)
BASOPHILS NFR BLD: 0.4 %
BILIRUB SERPL-MCNC: 0.3 MG/DL (ref 0–1)
BILIRUB UR QL STRIP.AUTO: NEGATIVE
BUN SERPL-MCNC: 36 MG/DL (ref 7–20)
CALCIUM SERPL-MCNC: 9.8 MG/DL (ref 8.3–10.6)
CHLORIDE SERPL-SCNC: 106 MMOL/L (ref 99–110)
CLARITY UR: ABNORMAL
CO2 SERPL-SCNC: 28 MMOL/L (ref 21–32)
COLOR UR: YELLOW
CREAT SERPL-MCNC: 0.6 MG/DL (ref 0.6–1.2)
DEPRECATED RDW RBC AUTO: 14 % (ref 12.4–15.4)
EKG ATRIAL RATE: 64 BPM
EKG DIAGNOSIS: NORMAL
EKG P AXIS: 83 DEGREES
EKG P-R INTERVAL: 194 MS
EKG Q-T INTERVAL: 408 MS
EKG QRS DURATION: 96 MS
EKG QTC CALCULATION (BAZETT): 420 MS
EKG R AXIS: -43 DEGREES
EKG T AXIS: 45 DEGREES
EKG VENTRICULAR RATE: 64 BPM
EOSINOPHIL # BLD: 0.1 K/UL (ref 0–0.6)
EOSINOPHIL NFR BLD: 1.9 %
EPI CELLS #/AREA URNS AUTO: 0 /HPF (ref 0–5)
GFR SERPLBLD CREATININE-BSD FMLA CKD-EPI: >60 ML/MIN/{1.73_M2}
GLUCOSE SERPL-MCNC: 104 MG/DL (ref 70–99)
GLUCOSE UR STRIP.AUTO-MCNC: NEGATIVE MG/DL
HCT VFR BLD AUTO: 42.8 % (ref 36–48)
HGB BLD-MCNC: 14.1 G/DL (ref 12–16)
HGB UR QL STRIP.AUTO: NEGATIVE
HYALINE CASTS #/AREA URNS AUTO: 1 /LPF (ref 0–8)
KETONES UR STRIP.AUTO-MCNC: NEGATIVE MG/DL
LEUKOCYTE ESTERASE UR QL STRIP.AUTO: ABNORMAL
LIPASE SERPL-CCNC: 79 U/L (ref 13–60)
LYMPHOCYTES # BLD: 1.1 K/UL (ref 1–5.1)
LYMPHOCYTES NFR BLD: 20.5 %
MCH RBC QN AUTO: 29.8 PG (ref 26–34)
MCHC RBC AUTO-ENTMCNC: 33.1 G/DL (ref 31–36)
MCV RBC AUTO: 90.1 FL (ref 80–100)
MONOCYTES # BLD: 0.3 K/UL (ref 0–1.3)
MONOCYTES NFR BLD: 5.6 %
NEUTROPHILS # BLD: 3.8 K/UL (ref 1.7–7.7)
NEUTROPHILS NFR BLD: 71.6 %
NITRITE UR QL STRIP.AUTO: POSITIVE
PH UR STRIP.AUTO: 7.5 [PH] (ref 5–8)
PLATELET # BLD AUTO: 172 K/UL (ref 135–450)
PMV BLD AUTO: 8.4 FL (ref 5–10.5)
POTASSIUM SERPL-SCNC: 4 MMOL/L (ref 3.5–5.1)
PROT SERPL-MCNC: 6.3 G/DL (ref 6.4–8.2)
PROT UR STRIP.AUTO-MCNC: NEGATIVE MG/DL
RBC # BLD AUTO: 4.75 M/UL (ref 4–5.2)
RBC CLUMPS #/AREA URNS AUTO: 1 /HPF (ref 0–4)
REASON FOR REJECTION: NORMAL
REJECTED TEST: NORMAL
SODIUM SERPL-SCNC: 142 MMOL/L (ref 136–145)
SP GR UR STRIP.AUTO: 1.01 (ref 1–1.03)
TROPONIN, HIGH SENSITIVITY: 9 NG/L (ref 0–14)
UA COMPLETE W REFLEX CULTURE PNL UR: YES
UA DIPSTICK W REFLEX MICRO PNL UR: YES
URN SPEC COLLECT METH UR: ABNORMAL
UROBILINOGEN UR STRIP-ACNC: 0.2 E.U./DL
WBC # BLD AUTO: 5.3 K/UL (ref 4–11)
WBC #/AREA URNS AUTO: 32 /HPF (ref 0–5)

## 2023-04-30 PROCEDURE — 36415 COLL VENOUS BLD VENIPUNCTURE: CPT

## 2023-04-30 PROCEDURE — 87186 SC STD MICRODIL/AGAR DIL: CPT

## 2023-04-30 PROCEDURE — 99222 1ST HOSP IP/OBS MODERATE 55: CPT | Performed by: SURGERY

## 2023-04-30 PROCEDURE — 2580000003 HC RX 258: Performed by: INTERNAL MEDICINE

## 2023-04-30 PROCEDURE — 87086 URINE CULTURE/COLONY COUNT: CPT

## 2023-04-30 PROCEDURE — 93010 ELECTROCARDIOGRAM REPORT: CPT | Performed by: INTERNAL MEDICINE

## 2023-04-30 PROCEDURE — 1200000000 HC SEMI PRIVATE

## 2023-04-30 PROCEDURE — 70450 CT HEAD/BRAIN W/O DYE: CPT

## 2023-04-30 PROCEDURE — 6360000002 HC RX W HCPCS: Performed by: PHYSICIAN ASSISTANT

## 2023-04-30 PROCEDURE — 80053 COMPREHEN METABOLIC PANEL: CPT

## 2023-04-30 PROCEDURE — 84484 ASSAY OF TROPONIN QUANT: CPT

## 2023-04-30 PROCEDURE — 96375 TX/PRO/DX INJ NEW DRUG ADDON: CPT

## 2023-04-30 PROCEDURE — 81001 URINALYSIS AUTO W/SCOPE: CPT

## 2023-04-30 PROCEDURE — 74177 CT ABD & PELVIS W/CONTRAST: CPT

## 2023-04-30 PROCEDURE — 87077 CULTURE AEROBIC IDENTIFY: CPT

## 2023-04-30 PROCEDURE — 85025 COMPLETE CBC W/AUTO DIFF WBC: CPT

## 2023-04-30 PROCEDURE — 93005 ELECTROCARDIOGRAM TRACING: CPT | Performed by: EMERGENCY MEDICINE

## 2023-04-30 PROCEDURE — 96365 THER/PROPH/DIAG IV INF INIT: CPT

## 2023-04-30 PROCEDURE — 2580000003 HC RX 258: Performed by: PHYSICIAN ASSISTANT

## 2023-04-30 PROCEDURE — 83690 ASSAY OF LIPASE: CPT

## 2023-04-30 PROCEDURE — 71045 X-RAY EXAM CHEST 1 VIEW: CPT

## 2023-04-30 PROCEDURE — 6360000002 HC RX W HCPCS: Performed by: INTERNAL MEDICINE

## 2023-04-30 PROCEDURE — 6360000004 HC RX CONTRAST MEDICATION: Performed by: EMERGENCY MEDICINE

## 2023-04-30 PROCEDURE — 99285 EMERGENCY DEPT VISIT HI MDM: CPT

## 2023-04-30 RX ORDER — FOLIC ACID 1 MG/1
500 TABLET ORAL EVERY OTHER DAY
Status: DISCONTINUED | OUTPATIENT
Start: 2023-05-01 | End: 2023-05-03 | Stop reason: HOSPADM

## 2023-04-30 RX ORDER — ENOXAPARIN SODIUM 100 MG/ML
40 INJECTION SUBCUTANEOUS DAILY
Status: DISCONTINUED | OUTPATIENT
Start: 2023-04-30 | End: 2023-05-03 | Stop reason: HOSPADM

## 2023-04-30 RX ORDER — SODIUM CHLORIDE 9 MG/ML
INJECTION, SOLUTION INTRAVENOUS PRN
Status: DISCONTINUED | OUTPATIENT
Start: 2023-04-30 | End: 2023-05-03 | Stop reason: HOSPADM

## 2023-04-30 RX ORDER — 0.9 % SODIUM CHLORIDE 0.9 %
500 INTRAVENOUS SOLUTION INTRAVENOUS ONCE
Status: COMPLETED | OUTPATIENT
Start: 2023-04-30 | End: 2023-04-30

## 2023-04-30 RX ORDER — SODIUM CHLORIDE 9 MG/ML
INJECTION, SOLUTION INTRAVENOUS CONTINUOUS
Status: DISCONTINUED | OUTPATIENT
Start: 2023-04-30 | End: 2023-05-03

## 2023-04-30 RX ORDER — MEMANTINE HYDROCHLORIDE 5 MG/1
5 TABLET ORAL DAILY
Status: DISCONTINUED | OUTPATIENT
Start: 2023-04-30 | End: 2023-05-03 | Stop reason: HOSPADM

## 2023-04-30 RX ORDER — SODIUM CHLORIDE 0.9 % (FLUSH) 0.9 %
5-40 SYRINGE (ML) INJECTION EVERY 12 HOURS SCHEDULED
Status: DISCONTINUED | OUTPATIENT
Start: 2023-04-30 | End: 2023-05-03 | Stop reason: HOSPADM

## 2023-04-30 RX ORDER — MORPHINE SULFATE 2 MG/ML
2 INJECTION, SOLUTION INTRAMUSCULAR; INTRAVENOUS EVERY 4 HOURS PRN
Status: DISCONTINUED | OUTPATIENT
Start: 2023-04-30 | End: 2023-05-01

## 2023-04-30 RX ORDER — ONDANSETRON 2 MG/ML
4 INJECTION INTRAMUSCULAR; INTRAVENOUS EVERY 6 HOURS PRN
Status: DISCONTINUED | OUTPATIENT
Start: 2023-04-30 | End: 2023-05-03 | Stop reason: HOSPADM

## 2023-04-30 RX ORDER — SODIUM CHLORIDE 0.9 % (FLUSH) 0.9 %
5-40 SYRINGE (ML) INJECTION PRN
Status: DISCONTINUED | OUTPATIENT
Start: 2023-04-30 | End: 2023-05-03 | Stop reason: HOSPADM

## 2023-04-30 RX ORDER — FENTANYL CITRATE 50 UG/ML
25 INJECTION, SOLUTION INTRAMUSCULAR; INTRAVENOUS ONCE
Status: COMPLETED | OUTPATIENT
Start: 2023-04-30 | End: 2023-04-30

## 2023-04-30 RX ORDER — POLYETHYLENE GLYCOL 3350 17 G/17G
17 POWDER, FOR SOLUTION ORAL DAILY PRN
Status: DISCONTINUED | OUTPATIENT
Start: 2023-04-30 | End: 2023-05-03 | Stop reason: HOSPADM

## 2023-04-30 RX ORDER — ONDANSETRON 4 MG/1
4 TABLET, ORALLY DISINTEGRATING ORAL EVERY 8 HOURS PRN
Status: DISCONTINUED | OUTPATIENT
Start: 2023-04-30 | End: 2023-05-03 | Stop reason: HOSPADM

## 2023-04-30 RX ORDER — LANOLIN ALCOHOL/MO/W.PET/CERES
400 CREAM (GRAM) TOPICAL DAILY PRN
Status: DISCONTINUED | OUTPATIENT
Start: 2023-04-30 | End: 2023-05-03 | Stop reason: HOSPADM

## 2023-04-30 RX ORDER — ACETAMINOPHEN 650 MG/1
650 SUPPOSITORY RECTAL EVERY 6 HOURS PRN
Status: DISCONTINUED | OUTPATIENT
Start: 2023-04-30 | End: 2023-05-03 | Stop reason: HOSPADM

## 2023-04-30 RX ORDER — ONDANSETRON 2 MG/ML
4 INJECTION INTRAMUSCULAR; INTRAVENOUS EVERY 30 MIN PRN
Status: DISCONTINUED | OUTPATIENT
Start: 2023-04-30 | End: 2023-04-30 | Stop reason: ALTCHOICE

## 2023-04-30 RX ORDER — ACETAMINOPHEN 325 MG/1
650 TABLET ORAL EVERY 6 HOURS PRN
Status: DISCONTINUED | OUTPATIENT
Start: 2023-04-30 | End: 2023-05-02 | Stop reason: DRUGHIGH

## 2023-04-30 RX ADMIN — PIPERACILLIN AND TAZOBACTAM 3375 MG: 3; .375 INJECTION, POWDER, LYOPHILIZED, FOR SOLUTION INTRAVENOUS at 20:29

## 2023-04-30 RX ADMIN — CEFTRIAXONE SODIUM 1000 MG: 1 INJECTION, POWDER, FOR SOLUTION INTRAMUSCULAR; INTRAVENOUS at 10:21

## 2023-04-30 RX ADMIN — FENTANYL CITRATE 25 MCG: 50 INJECTION, SOLUTION INTRAMUSCULAR; INTRAVENOUS at 09:28

## 2023-04-30 RX ADMIN — SODIUM CHLORIDE: 9 INJECTION, SOLUTION INTRAVENOUS at 17:12

## 2023-04-30 RX ADMIN — IOPAMIDOL 75 ML: 755 INJECTION, SOLUTION INTRAVENOUS at 11:11

## 2023-04-30 RX ADMIN — ONDANSETRON 4 MG: 2 INJECTION INTRAMUSCULAR; INTRAVENOUS at 09:27

## 2023-04-30 RX ADMIN — ENOXAPARIN SODIUM 40 MG: 100 INJECTION SUBCUTANEOUS at 16:59

## 2023-04-30 RX ADMIN — PIPERACILLIN AND TAZOBACTAM 3375 MG: 3; .375 INJECTION, POWDER, LYOPHILIZED, FOR SOLUTION INTRAVENOUS at 13:06

## 2023-04-30 RX ADMIN — SODIUM CHLORIDE 500 ML: 9 INJECTION, SOLUTION INTRAVENOUS at 12:31

## 2023-04-30 ASSESSMENT — PAIN DESCRIPTION - DESCRIPTORS: DESCRIPTORS: ACHING

## 2023-04-30 ASSESSMENT — LIFESTYLE VARIABLES: HOW OFTEN DO YOU HAVE A DRINK CONTAINING ALCOHOL: NEVER

## 2023-04-30 ASSESSMENT — PAIN DESCRIPTION - LOCATION: LOCATION: ABDOMEN

## 2023-04-30 ASSESSMENT — PAIN - FUNCTIONAL ASSESSMENT: PAIN_FUNCTIONAL_ASSESSMENT: 0-10

## 2023-04-30 ASSESSMENT — PAIN DESCRIPTION - PAIN TYPE: TYPE: ACUTE PAIN

## 2023-04-30 ASSESSMENT — PAIN SCALES - GENERAL: PAINLEVEL_OUTOF10: 9

## 2023-05-01 ENCOUNTER — APPOINTMENT (OUTPATIENT)
Dept: GENERAL RADIOLOGY | Age: 88
DRG: 417 | End: 2023-05-01
Payer: MEDICARE

## 2023-05-01 ENCOUNTER — ANESTHESIA EVENT (OUTPATIENT)
Dept: OPERATING ROOM | Age: 88
DRG: 417 | End: 2023-05-01
Payer: MEDICARE

## 2023-05-01 ENCOUNTER — ANESTHESIA (OUTPATIENT)
Dept: OPERATING ROOM | Age: 88
DRG: 417 | End: 2023-05-01
Payer: MEDICARE

## 2023-05-01 PROBLEM — J96.01 ACUTE RESPIRATORY FAILURE WITH HYPOXIA AND HYPERCAPNIA (HCC): Status: ACTIVE | Noted: 2023-05-01

## 2023-05-01 PROBLEM — J96.02 ACUTE RESPIRATORY FAILURE WITH HYPOXIA AND HYPERCAPNIA (HCC): Status: ACTIVE | Noted: 2023-05-01

## 2023-05-01 PROBLEM — R41.82 ALTERED MENTAL STATUS: Status: ACTIVE | Noted: 2023-05-01

## 2023-05-01 PROBLEM — G93.41 ACUTE METABOLIC ENCEPHALOPATHY: Status: ACTIVE | Noted: 2023-05-01

## 2023-05-01 LAB
ANION GAP SERPL CALCULATED.3IONS-SCNC: 5 MMOL/L (ref 3–16)
BASE EXCESS BLDA CALC-SCNC: 1 MMOL/L (ref -3–3)
BASOPHILS # BLD: 0 K/UL (ref 0–0.2)
BASOPHILS NFR BLD: 0.7 %
BUN SERPL-MCNC: 18 MG/DL (ref 7–20)
CA-I BLD-SCNC: 1.32 MMOL/L (ref 1.12–1.32)
CALCIUM SERPL-MCNC: 9 MG/DL (ref 8.3–10.6)
CHLORIDE SERPL-SCNC: 109 MMOL/L (ref 99–110)
CO2 BLDA-SCNC: 32 MMOL/L
CO2 SERPL-SCNC: 26 MMOL/L (ref 21–32)
CREAT SERPL-MCNC: 0.6 MG/DL (ref 0.6–1.2)
DEPRECATED RDW RBC AUTO: 13.7 % (ref 12.4–15.4)
EOSINOPHIL # BLD: 0.1 K/UL (ref 0–0.6)
EOSINOPHIL NFR BLD: 3 %
GFR SERPLBLD CREATININE-BSD FMLA CKD-EPI: >60 ML/MIN/{1.73_M2}
GLUCOSE BLD-MCNC: 157 MG/DL (ref 70–99)
GLUCOSE SERPL-MCNC: 84 MG/DL (ref 70–99)
HCO3 BLDA-SCNC: 29.8 MMOL/L (ref 21–29)
HCT VFR BLD AUTO: 40 % (ref 36–48)
HCT VFR BLD AUTO: 44 % (ref 36–48)
HGB BLD CALC-MCNC: 15 GM/DL (ref 12–16)
HGB BLD-MCNC: 12.9 G/DL (ref 12–16)
LACTATE BLD-SCNC: 0.74 MMOL/L (ref 0.4–2)
LYMPHOCYTES # BLD: 1.4 K/UL (ref 1–5.1)
LYMPHOCYTES NFR BLD: 32 %
MCH RBC QN AUTO: 29.1 PG (ref 26–34)
MCHC RBC AUTO-ENTMCNC: 32.3 G/DL (ref 31–36)
MCV RBC AUTO: 89.9 FL (ref 80–100)
MONOCYTES # BLD: 0.4 K/UL (ref 0–1.3)
MONOCYTES NFR BLD: 8.9 %
NEUTROPHILS # BLD: 2.4 K/UL (ref 1.7–7.7)
NEUTROPHILS NFR BLD: 55.4 %
PCO2 BLDA: 83.8 MM HG (ref 35–45)
PERFORMED ON: ABNORMAL
PH BLDA: 7.16 [PH] (ref 7.35–7.45)
PLATELET # BLD AUTO: 141 K/UL (ref 135–450)
PMV BLD AUTO: 7.9 FL (ref 5–10.5)
PO2 BLDA: 112.1 MM HG (ref 75–108)
POC SAMPLE TYPE: ABNORMAL
POTASSIUM BLD-SCNC: 3.5 MMOL/L (ref 3.5–5.1)
POTASSIUM SERPL-SCNC: 3.6 MMOL/L (ref 3.5–5.1)
RBC # BLD AUTO: 4.44 M/UL (ref 4–5.2)
SAO2 % BLDA: 96 % (ref 93–100)
SODIUM BLD-SCNC: 144 MMOL/L (ref 136–145)
SODIUM SERPL-SCNC: 140 MMOL/L (ref 136–145)
WBC # BLD AUTO: 4.3 K/UL (ref 4–11)

## 2023-05-01 PROCEDURE — 36415 COLL VENOUS BLD VENIPUNCTURE: CPT

## 2023-05-01 PROCEDURE — 82947 ASSAY GLUCOSE BLOOD QUANT: CPT

## 2023-05-01 PROCEDURE — BF131ZZ FLUOROSCOPY OF GALLBLADDER AND BILE DUCTS USING LOW OSMOLAR CONTRAST: ICD-10-PCS | Performed by: SURGERY

## 2023-05-01 PROCEDURE — 3600000004 HC SURGERY LEVEL 4 BASE: Performed by: SURGERY

## 2023-05-01 PROCEDURE — 2580000003 HC RX 258: Performed by: ANESTHESIOLOGY

## 2023-05-01 PROCEDURE — 6370000000 HC RX 637 (ALT 250 FOR IP): Performed by: INTERNAL MEDICINE

## 2023-05-01 PROCEDURE — 2720000010 HC SURG SUPPLY STERILE: Performed by: SURGERY

## 2023-05-01 PROCEDURE — 6360000004 HC RX CONTRAST MEDICATION: Performed by: SURGERY

## 2023-05-01 PROCEDURE — 88304 TISSUE EXAM BY PATHOLOGIST: CPT

## 2023-05-01 PROCEDURE — 6360000002 HC RX W HCPCS: Performed by: SURGERY

## 2023-05-01 PROCEDURE — 2580000003 HC RX 258: Performed by: SURGERY

## 2023-05-01 PROCEDURE — 74300 X-RAY BILE DUCTS/PANCREAS: CPT

## 2023-05-01 PROCEDURE — 84132 ASSAY OF SERUM POTASSIUM: CPT

## 2023-05-01 PROCEDURE — 2000000000 HC ICU R&B

## 2023-05-01 PROCEDURE — 85025 COMPLETE CBC W/AUTO DIFF WBC: CPT

## 2023-05-01 PROCEDURE — 2700000000 HC OXYGEN THERAPY PER DAY

## 2023-05-01 PROCEDURE — 36600 WITHDRAWAL OF ARTERIAL BLOOD: CPT

## 2023-05-01 PROCEDURE — 82330 ASSAY OF CALCIUM: CPT

## 2023-05-01 PROCEDURE — 6360000002 HC RX W HCPCS

## 2023-05-01 PROCEDURE — 82803 BLOOD GASES ANY COMBINATION: CPT

## 2023-05-01 PROCEDURE — 3600000014 HC SURGERY LEVEL 4 ADDTL 15MIN: Performed by: SURGERY

## 2023-05-01 PROCEDURE — 6360000002 HC RX W HCPCS: Performed by: INTERNAL MEDICINE

## 2023-05-01 PROCEDURE — 84295 ASSAY OF SERUM SODIUM: CPT

## 2023-05-01 PROCEDURE — 2709999900 HC NON-CHARGEABLE SUPPLY: Performed by: SURGERY

## 2023-05-01 PROCEDURE — A4217 STERILE WATER/SALINE, 500 ML: HCPCS | Performed by: SURGERY

## 2023-05-01 PROCEDURE — 47563 LAPARO CHOLECYSTECTOMY/GRAPH: CPT | Performed by: SURGERY

## 2023-05-01 PROCEDURE — 94761 N-INVAS EAR/PLS OXIMETRY MLT: CPT

## 2023-05-01 PROCEDURE — 85014 HEMATOCRIT: CPT

## 2023-05-01 PROCEDURE — 99223 1ST HOSP IP/OBS HIGH 75: CPT | Performed by: INTERNAL MEDICINE

## 2023-05-01 PROCEDURE — 80048 BASIC METABOLIC PNL TOTAL CA: CPT

## 2023-05-01 PROCEDURE — 2580000003 HC RX 258: Performed by: INTERNAL MEDICINE

## 2023-05-01 PROCEDURE — 2500000003 HC RX 250 WO HCPCS: Performed by: NURSE ANESTHETIST, CERTIFIED REGISTERED

## 2023-05-01 PROCEDURE — 6360000002 HC RX W HCPCS: Performed by: ANESTHESIOLOGY

## 2023-05-01 PROCEDURE — 6360000002 HC RX W HCPCS: Performed by: NURSE ANESTHETIST, CERTIFIED REGISTERED

## 2023-05-01 PROCEDURE — 0FT44ZZ RESECTION OF GALLBLADDER, PERCUTANEOUS ENDOSCOPIC APPROACH: ICD-10-PCS | Performed by: SURGERY

## 2023-05-01 PROCEDURE — 83605 ASSAY OF LACTIC ACID: CPT

## 2023-05-01 PROCEDURE — 3700000000 HC ANESTHESIA ATTENDED CARE: Performed by: SURGERY

## 2023-05-01 PROCEDURE — 7100000000 HC PACU RECOVERY - FIRST 15 MIN: Performed by: SURGERY

## 2023-05-01 PROCEDURE — 94660 CPAP INITIATION&MGMT: CPT

## 2023-05-01 PROCEDURE — 6370000000 HC RX 637 (ALT 250 FOR IP): Performed by: SURGERY

## 2023-05-01 PROCEDURE — 3700000001 HC ADD 15 MINUTES (ANESTHESIA): Performed by: SURGERY

## 2023-05-01 PROCEDURE — 2500000003 HC RX 250 WO HCPCS: Performed by: SURGERY

## 2023-05-01 PROCEDURE — 7100000001 HC PACU RECOVERY - ADDTL 15 MIN: Performed by: SURGERY

## 2023-05-01 RX ORDER — SODIUM CHLORIDE 9 MG/ML
INJECTION, SOLUTION INTRAVENOUS CONTINUOUS
Status: DISCONTINUED | OUTPATIENT
Start: 2023-05-01 | End: 2023-05-01 | Stop reason: HOSPADM

## 2023-05-01 RX ORDER — HYDROMORPHONE HCL 110MG/55ML
PATIENT CONTROLLED ANALGESIA SYRINGE INTRAVENOUS
Status: COMPLETED
Start: 2023-05-01 | End: 2023-05-01

## 2023-05-01 RX ORDER — NALOXONE HYDROCHLORIDE 0.4 MG/ML
INJECTION, SOLUTION INTRAMUSCULAR; INTRAVENOUS; SUBCUTANEOUS
Status: DISPENSED
Start: 2023-05-01 | End: 2023-05-02

## 2023-05-01 RX ORDER — SODIUM CHLORIDE 9 MG/ML
INJECTION, SOLUTION INTRAVENOUS PRN
Status: DISCONTINUED | OUTPATIENT
Start: 2023-05-01 | End: 2023-05-01 | Stop reason: HOSPADM

## 2023-05-01 RX ORDER — FENTANYL CITRATE 50 UG/ML
INJECTION, SOLUTION INTRAMUSCULAR; INTRAVENOUS PRN
Status: DISCONTINUED | OUTPATIENT
Start: 2023-05-01 | End: 2023-05-01 | Stop reason: SDUPTHER

## 2023-05-01 RX ORDER — LIDOCAINE HYDROCHLORIDE 10 MG/ML
1 INJECTION, SOLUTION EPIDURAL; INFILTRATION; INTRACAUDAL; PERINEURAL
Status: DISCONTINUED | OUTPATIENT
Start: 2023-05-01 | End: 2023-05-01 | Stop reason: HOSPADM

## 2023-05-01 RX ORDER — PROCHLORPERAZINE EDISYLATE 5 MG/ML
5 INJECTION INTRAMUSCULAR; INTRAVENOUS
Status: DISCONTINUED | OUTPATIENT
Start: 2023-05-01 | End: 2023-05-01 | Stop reason: HOSPADM

## 2023-05-01 RX ORDER — HYDROMORPHONE HYDROCHLORIDE 1 MG/ML
1 INJECTION, SOLUTION INTRAMUSCULAR; INTRAVENOUS; SUBCUTANEOUS ONCE
Status: COMPLETED | OUTPATIENT
Start: 2023-05-01 | End: 2023-05-01

## 2023-05-01 RX ORDER — BUPIVACAINE HYDROCHLORIDE AND EPINEPHRINE 5; 5 MG/ML; UG/ML
INJECTION, SOLUTION EPIDURAL; INTRACAUDAL; PERINEURAL
Status: COMPLETED | OUTPATIENT
Start: 2023-05-01 | End: 2023-05-01

## 2023-05-01 RX ORDER — LABETALOL HYDROCHLORIDE 5 MG/ML
10 INJECTION, SOLUTION INTRAVENOUS
Status: DISCONTINUED | OUTPATIENT
Start: 2023-05-01 | End: 2023-05-01 | Stop reason: HOSPADM

## 2023-05-01 RX ORDER — NALOXONE HYDROCHLORIDE 0.4 MG/ML
0.4 INJECTION, SOLUTION INTRAMUSCULAR; INTRAVENOUS; SUBCUTANEOUS ONCE
Status: CANCELLED | OUTPATIENT
Start: 2023-05-01

## 2023-05-01 RX ORDER — LIDOCAINE HYDROCHLORIDE 20 MG/ML
INJECTION, SOLUTION INFILTRATION; PERINEURAL PRN
Status: DISCONTINUED | OUTPATIENT
Start: 2023-05-01 | End: 2023-05-01 | Stop reason: SDUPTHER

## 2023-05-01 RX ORDER — MAGNESIUM HYDROXIDE 1200 MG/15ML
LIQUID ORAL CONTINUOUS PRN
Status: COMPLETED | OUTPATIENT
Start: 2023-05-01 | End: 2023-05-01

## 2023-05-01 RX ORDER — ACETAMINOPHEN 325 MG/1
650 TABLET ORAL EVERY 6 HOURS
Status: DISCONTINUED | OUTPATIENT
Start: 2023-05-01 | End: 2023-05-03 | Stop reason: HOSPADM

## 2023-05-01 RX ORDER — FENTANYL CITRATE 50 UG/ML
25 INJECTION, SOLUTION INTRAMUSCULAR; INTRAVENOUS EVERY 5 MIN PRN
Status: DISCONTINUED | OUTPATIENT
Start: 2023-05-01 | End: 2023-05-01 | Stop reason: HOSPADM

## 2023-05-01 RX ORDER — SODIUM CHLORIDE 0.9 % (FLUSH) 0.9 %
5-40 SYRINGE (ML) INJECTION PRN
Status: DISCONTINUED | OUTPATIENT
Start: 2023-05-01 | End: 2023-05-01 | Stop reason: HOSPADM

## 2023-05-01 RX ORDER — SUCCINYLCHOLINE CHLORIDE 20 MG/ML
INJECTION INTRAMUSCULAR; INTRAVENOUS PRN
Status: DISCONTINUED | OUTPATIENT
Start: 2023-05-01 | End: 2023-05-01 | Stop reason: SDUPTHER

## 2023-05-01 RX ORDER — HYDRALAZINE HYDROCHLORIDE 20 MG/ML
10 INJECTION INTRAMUSCULAR; INTRAVENOUS
Status: DISCONTINUED | OUTPATIENT
Start: 2023-05-01 | End: 2023-05-01 | Stop reason: HOSPADM

## 2023-05-01 RX ORDER — SODIUM CHLORIDE 9 MG/ML
25 INJECTION, SOLUTION INTRAVENOUS PRN
Status: DISCONTINUED | OUTPATIENT
Start: 2023-05-01 | End: 2023-05-01 | Stop reason: HOSPADM

## 2023-05-01 RX ORDER — KETOROLAC TROMETHAMINE 15 MG/ML
15 INJECTION, SOLUTION INTRAMUSCULAR; INTRAVENOUS EVERY 6 HOURS
Status: DISCONTINUED | OUTPATIENT
Start: 2023-05-01 | End: 2023-05-03

## 2023-05-01 RX ORDER — HYDROMORPHONE HCL 110MG/55ML
0.25 PATIENT CONTROLLED ANALGESIA SYRINGE INTRAVENOUS EVERY 5 MIN PRN
Status: DISCONTINUED | OUTPATIENT
Start: 2023-05-01 | End: 2023-05-01 | Stop reason: HOSPADM

## 2023-05-01 RX ORDER — ROCURONIUM BROMIDE 10 MG/ML
INJECTION, SOLUTION INTRAVENOUS PRN
Status: DISCONTINUED | OUTPATIENT
Start: 2023-05-01 | End: 2023-05-01 | Stop reason: SDUPTHER

## 2023-05-01 RX ORDER — SODIUM CHLORIDE 0.9 % (FLUSH) 0.9 %
5-40 SYRINGE (ML) INJECTION EVERY 12 HOURS SCHEDULED
Status: DISCONTINUED | OUTPATIENT
Start: 2023-05-01 | End: 2023-05-01 | Stop reason: HOSPADM

## 2023-05-01 RX ORDER — ETOMIDATE 2 MG/ML
INJECTION INTRAVENOUS PRN
Status: DISCONTINUED | OUTPATIENT
Start: 2023-05-01 | End: 2023-05-01 | Stop reason: SDUPTHER

## 2023-05-01 RX ORDER — ONDANSETRON 2 MG/ML
4 INJECTION INTRAMUSCULAR; INTRAVENOUS
Status: DISCONTINUED | OUTPATIENT
Start: 2023-05-01 | End: 2023-05-01 | Stop reason: HOSPADM

## 2023-05-01 RX ORDER — ZIPRASIDONE MESYLATE 20 MG/ML
20 INJECTION, POWDER, LYOPHILIZED, FOR SOLUTION INTRAMUSCULAR EVERY 12 HOURS PRN
Status: DISCONTINUED | OUTPATIENT
Start: 2023-05-01 | End: 2023-05-03 | Stop reason: HOSPADM

## 2023-05-01 RX ADMIN — LIDOCAINE HYDROCHLORIDE 80 MG: 20 INJECTION, SOLUTION INFILTRATION; PERINEURAL at 11:15

## 2023-05-01 RX ADMIN — ETOMIDATE 10 MG: 2 INJECTION, SOLUTION INTRAVENOUS at 11:17

## 2023-05-01 RX ADMIN — ROCURONIUM BROMIDE 25 MG: 10 INJECTION, SOLUTION INTRAVENOUS at 11:25

## 2023-05-01 RX ADMIN — FENTANYL CITRATE 25 MCG: 50 INJECTION, SOLUTION INTRAMUSCULAR; INTRAVENOUS at 11:18

## 2023-05-01 RX ADMIN — HYDROMORPHONE HYDROCHLORIDE 0.25 MG: 2 INJECTION, SOLUTION INTRAMUSCULAR; INTRAVENOUS; SUBCUTANEOUS at 12:35

## 2023-05-01 RX ADMIN — ETOMIDATE 5 MG: 2 INJECTION, SOLUTION INTRAVENOUS at 11:19

## 2023-05-01 RX ADMIN — SODIUM CHLORIDE: 9 INJECTION, SOLUTION INTRAVENOUS at 22:33

## 2023-05-01 RX ADMIN — FENTANYL CITRATE 25 MCG: 50 INJECTION, SOLUTION INTRAMUSCULAR; INTRAVENOUS at 11:41

## 2023-05-01 RX ADMIN — HYDROMORPHONE HYDROCHLORIDE 0.25 MG: 2 INJECTION, SOLUTION INTRAMUSCULAR; INTRAVENOUS; SUBCUTANEOUS at 13:00

## 2023-05-01 RX ADMIN — FENTANYL CITRATE 25 MCG: 50 INJECTION, SOLUTION INTRAMUSCULAR; INTRAVENOUS at 11:13

## 2023-05-01 RX ADMIN — SUCCINYLCHOLINE CHLORIDE 120 MG: 20 INJECTION, SOLUTION INTRAMUSCULAR; INTRAVENOUS at 11:18

## 2023-05-01 RX ADMIN — KETOROLAC TROMETHAMINE 15 MG: 15 INJECTION, SOLUTION INTRAMUSCULAR; INTRAVENOUS at 22:28

## 2023-05-01 RX ADMIN — HYDROMORPHONE HYDROCHLORIDE 1 MG: 1 INJECTION, SOLUTION INTRAMUSCULAR; INTRAVENOUS; SUBCUTANEOUS at 16:40

## 2023-05-01 RX ADMIN — ZIPRASIDONE MESYLATE 20 MG: 20 INJECTION, POWDER, LYOPHILIZED, FOR SOLUTION INTRAMUSCULAR at 15:28

## 2023-05-01 RX ADMIN — PIPERACILLIN AND TAZOBACTAM 3375 MG: 3; .375 INJECTION, POWDER, LYOPHILIZED, FOR SOLUTION INTRAVENOUS at 03:47

## 2023-05-01 RX ADMIN — PIPERACILLIN AND TAZOBACTAM 3375 MG: 3; .375 INJECTION, POWDER, LYOPHILIZED, FOR SOLUTION INTRAVENOUS at 16:34

## 2023-05-01 RX ADMIN — Medication 10 ML: at 22:28

## 2023-05-01 RX ADMIN — HYDROMORPHONE HYDROCHLORIDE 0.25 MG: 2 INJECTION, SOLUTION INTRAMUSCULAR; INTRAVENOUS; SUBCUTANEOUS at 12:49

## 2023-05-01 RX ADMIN — SUGAMMADEX 200 MG: 100 INJECTION, SOLUTION INTRAVENOUS at 12:15

## 2023-05-01 RX ADMIN — FENTANYL CITRATE 25 MCG: 50 INJECTION, SOLUTION INTRAMUSCULAR; INTRAVENOUS at 11:32

## 2023-05-01 RX ADMIN — SODIUM CHLORIDE: 9 INJECTION, SOLUTION INTRAVENOUS at 10:10

## 2023-05-01 ASSESSMENT — PAIN DESCRIPTION - LOCATION
LOCATION: ABDOMEN

## 2023-05-01 ASSESSMENT — PAIN DESCRIPTION - DESCRIPTORS
DESCRIPTORS: ACHING

## 2023-05-01 ASSESSMENT — PAIN SCALES - GENERAL
PAINLEVEL_OUTOF10: 10
PAINLEVEL_OUTOF10: 10
PAINLEVEL_OUTOF10: 0
PAINLEVEL_OUTOF10: 10
PAINLEVEL_OUTOF10: 0
PAINLEVEL_OUTOF10: 0
PAINLEVEL_OUTOF10: 10
PAINLEVEL_OUTOF10: 0
PAINLEVEL_OUTOF10: 7
PAINLEVEL_OUTOF10: 10
PAINLEVEL_OUTOF10: 0

## 2023-05-01 ASSESSMENT — PAIN SCALES - WONG BAKER
WONGBAKER_NUMERICALRESPONSE: 0
WONGBAKER_NUMERICALRESPONSE: 0

## 2023-05-01 ASSESSMENT — ENCOUNTER SYMPTOMS: SHORTNESS OF BREATH: 0

## 2023-05-01 ASSESSMENT — PAIN DESCRIPTION - ORIENTATION
ORIENTATION: MID
ORIENTATION: RIGHT;LEFT;MID
ORIENTATION: RIGHT;LEFT;MID

## 2023-05-01 ASSESSMENT — PAIN - FUNCTIONAL ASSESSMENT
PAIN_FUNCTIONAL_ASSESSMENT: 0-10
PAIN_FUNCTIONAL_ASSESSMENT: ACTIVITIES ARE NOT PREVENTED

## 2023-05-01 ASSESSMENT — PAIN DESCRIPTION - PAIN TYPE: TYPE: SURGICAL PAIN

## 2023-05-01 NOTE — BRIEF OP NOTE
Brief Postoperative Note      Patient: Cori Ocampo  YOB: 1935  MRN: [id number]    Date of Procedure: 5/1/2023    Pre-Op Diagnosis Codes:     * Cholecystitis [K81.9]    Post-Op Diagnosis: Same       Procedure(s):  CHOLECYSTECTOMY LAPAROSCOPIC, CHOLANGIOGRAM    Surgeon(s):  Evita Diaz MD    Assistant:  Surgical Assistant: Jonathan Polanco    Anesthesia: General    Estimated Blood Loss (mL): Minimal    Complications: None    Specimens:   ID Type Source Tests Collected by Time Destination   A : a.gallbladder  Tissue Tissue SURGICAL PATHOLOGY Evita Diaz MD 5/1/2023 1156        Implants:  * No implants in log *      Drains: * No LDAs found *    Findings: GB removed, was distended and thickened with pericholecystic fluid. IOC clear.       Electronically signed by Evita Diaz MD on 5/1/2023 at 12:08 PM

## 2023-05-01 NOTE — ANESTHESIA PRE PROCEDURE
Department of Anesthesiology  Preprocedure Note       Name:  Nadir Faulkner   Age:  81 y.o.  :  1935                                          MRN:  [id number]         Date:  2023      Surgeon: Keith Vaughn):  Mady Mathews MD    Procedure: Procedure(s):  CHOLECYSTECTOMY LAPAROSCOPIC, CHOLANGIOGRAM    Medications prior to admission:   Prior to Admission medications    Medication Sig Start Date End Date Taking?  Authorizing Provider   memantine (NAMENDA) 5 MG tablet TAKE 1 TABLET BY MOUTH DAILY 3/28/23   Darrin Gilbert MD   folic acid (FOLVITE) 421 MCG tablet Take 1 tablet by mouth every other day    Historical Provider, MD   Coenzyme Q10 (COQ10 PO) Take by mouth    Historical Provider, MD   Omega-3 Fatty Acids (FISH OIL EXTRA STRENGTH) 1200 MG CAPS Take by mouth    Historical Provider, MD   Cholecalciferol (VITAMIN D3) 50 MCG (2000) CAPS Take by mouth    Historical Provider, MD   magnesium oxide (MAG-OX) 400 MG tablet Take 1 tablet by mouth daily as needed Leg cramps 22   Darrin Gilbert MD       Current medications:    Current Facility-Administered Medications   Medication Dose Route Frequency Provider Last Rate Last Admin    folic acid (FOLVITE) tablet 500 mcg  500 mcg Oral Every Other Day Kg Awan MD        magnesium oxide (MAG-OX) tablet 400 mg  400 mg Oral Daily PRN Kg Awan MD        memantine [facility]) tablet 5 mg  5 mg Oral Daily Kg Awan MD        sodium chloride flush 0.9 % injection 5-40 mL  5-40 mL IntraVENous 2 times per day Kg Awan MD        sodium chloride flush 0.9 % injection 5-40 mL  5-40 mL IntraVENous PRN Kg Awan MD        0.9 % sodium chloride infusion   IntraVENous PRN Kg Awan MD        enoxaparin (LOVENOX) injection 40 mg  40 mg SubCUTAneous Daily Kg Awan MD   40 mg at 23 [id number]    ondansetron (ZOFRAN-ODT) disintegrating tablet 4 mg  4 mg Oral Q8H PRN Kg Awan MD        Or    ondansetron (ZOFRAN) injection 4 mg  4 mg

## 2023-05-02 ENCOUNTER — APPOINTMENT (OUTPATIENT)
Dept: GENERAL RADIOLOGY | Age: 88
DRG: 417 | End: 2023-05-02
Payer: MEDICARE

## 2023-05-02 LAB
ALBUMIN SERPL-MCNC: 3.7 G/DL (ref 3.4–5)
ALBUMIN/GLOB SERPL: 1.4 {RATIO} (ref 1.1–2.2)
ALP SERPL-CCNC: 75 U/L (ref 40–129)
ALT SERPL-CCNC: 25 U/L (ref 10–40)
ANION GAP SERPL CALCULATED.3IONS-SCNC: 7 MMOL/L (ref 3–16)
AST SERPL-CCNC: 19 U/L (ref 15–37)
BACTERIA UR CULT: ABNORMAL
BASE EXCESS BLDA CALC-SCNC: 0.6 MMOL/L (ref -3–3)
BASE EXCESS BLDA CALC-SCNC: 2.1 MMOL/L (ref -3–3)
BASOPHILS # BLD: 0 K/UL (ref 0–0.2)
BASOPHILS NFR BLD: 0.2 %
BILIRUB SERPL-MCNC: 0.8 MG/DL (ref 0–1)
BUN SERPL-MCNC: 21 MG/DL (ref 7–20)
CALCIUM SERPL-MCNC: 9.8 MG/DL (ref 8.3–10.6)
CHLORIDE SERPL-SCNC: 108 MMOL/L (ref 99–110)
CO2 BLDA-SCNC: 63.1 MMOL/L
CO2 BLDA-SCNC: 65.3 MMOL/L
CO2 SERPL-SCNC: 28 MMOL/L (ref 21–32)
COHGB MFR BLDA: 1.5 % (ref 0–1.5)
COHGB MFR BLDA: 1.7 % (ref 0–1.5)
CREAT SERPL-MCNC: 0.6 MG/DL (ref 0.6–1.2)
DEPRECATED RDW RBC AUTO: 14.2 % (ref 12.4–15.4)
EOSINOPHIL # BLD: 0 K/UL (ref 0–0.6)
EOSINOPHIL NFR BLD: 0.1 %
GFR SERPLBLD CREATININE-BSD FMLA CKD-EPI: >60 ML/MIN/{1.73_M2}
GLUCOSE SERPL-MCNC: 126 MG/DL (ref 70–99)
HCO3 BLDA-SCNC: 26.7 MMOL/L (ref 21–29)
HCO3 BLDA-SCNC: 27.7 MMOL/L (ref 21–29)
HCT VFR BLD AUTO: 43.1 % (ref 36–48)
HGB BLD-MCNC: 14.3 G/DL (ref 12–16)
HGB BLDA-MCNC: 14 G/DL (ref 12–16)
HGB BLDA-MCNC: 14.1 G/DL (ref 12–16)
LIPASE SERPL-CCNC: 35 U/L (ref 13–60)
LYMPHOCYTES # BLD: 0.7 K/UL (ref 1–5.1)
LYMPHOCYTES NFR BLD: 7 %
MCH RBC QN AUTO: 29.9 PG (ref 26–34)
MCHC RBC AUTO-ENTMCNC: 33.1 G/DL (ref 31–36)
MCV RBC AUTO: 90.3 FL (ref 80–100)
METHGB MFR BLDA: 0.2 %
METHGB MFR BLDA: 0.2 %
MONOCYTES # BLD: 0.6 K/UL (ref 0–1.3)
MONOCYTES NFR BLD: 6.2 %
NEUTROPHILS # BLD: 8.9 K/UL (ref 1.7–7.7)
NEUTROPHILS NFR BLD: 86.5 %
O2 THERAPY: ABNORMAL
O2 THERAPY: ABNORMAL
ORGANISM: ABNORMAL
PCO2 BLDA: 46.2 MMHG (ref 35–45)
PCO2 BLDA: 47.5 MMHG (ref 35–45)
PH BLDA: 7.36 [PH] (ref 7.35–7.45)
PH BLDA: 7.39 [PH] (ref 7.35–7.45)
PLATELET # BLD AUTO: 163 K/UL (ref 135–450)
PMV BLD AUTO: 8.1 FL (ref 5–10.5)
PO2 BLDA: 170 MMHG (ref 75–108)
PO2 BLDA: 72.7 MMHG (ref 75–108)
POTASSIUM SERPL-SCNC: 4.3 MMOL/L (ref 3.5–5.1)
POTASSIUM SERPL-SCNC: 4.3 MMOL/L (ref 3.5–5.1)
PROT SERPL-MCNC: 6.4 G/DL (ref 6.4–8.2)
RBC # BLD AUTO: 4.78 M/UL (ref 4–5.2)
SAO2 % BLDA: 96.1 %
SAO2 % BLDA: 99.8 %
SODIUM SERPL-SCNC: 143 MMOL/L (ref 136–145)
WBC # BLD AUTO: 10.3 K/UL (ref 4–11)

## 2023-05-02 PROCEDURE — 6360000002 HC RX W HCPCS: Performed by: INTERNAL MEDICINE

## 2023-05-02 PROCEDURE — 97530 THERAPEUTIC ACTIVITIES: CPT

## 2023-05-02 PROCEDURE — 36415 COLL VENOUS BLD VENIPUNCTURE: CPT

## 2023-05-02 PROCEDURE — 2580000003 HC RX 258: Performed by: NURSE PRACTITIONER

## 2023-05-02 PROCEDURE — 2580000003 HC RX 258: Performed by: SURGERY

## 2023-05-02 PROCEDURE — 94761 N-INVAS EAR/PLS OXIMETRY MLT: CPT

## 2023-05-02 PROCEDURE — 97535 SELF CARE MNGMENT TRAINING: CPT

## 2023-05-02 PROCEDURE — 82803 BLOOD GASES ANY COMBINATION: CPT

## 2023-05-02 PROCEDURE — APPNB30 APP NON BILLABLE TIME 0-30 MINS: Performed by: NURSE PRACTITIONER

## 2023-05-02 PROCEDURE — 97166 OT EVAL MOD COMPLEX 45 MIN: CPT

## 2023-05-02 PROCEDURE — 97161 PT EVAL LOW COMPLEX 20 MIN: CPT

## 2023-05-02 PROCEDURE — 2700000000 HC OXYGEN THERAPY PER DAY

## 2023-05-02 PROCEDURE — 85025 COMPLETE CBC W/AUTO DIFF WBC: CPT

## 2023-05-02 PROCEDURE — 6370000000 HC RX 637 (ALT 250 FOR IP): Performed by: INTERNAL MEDICINE

## 2023-05-02 PROCEDURE — 71045 X-RAY EXAM CHEST 1 VIEW: CPT

## 2023-05-02 PROCEDURE — 1200000000 HC SEMI PRIVATE

## 2023-05-02 PROCEDURE — 6360000002 HC RX W HCPCS: Performed by: NURSE PRACTITIONER

## 2023-05-02 PROCEDURE — 80053 COMPREHEN METABOLIC PANEL: CPT

## 2023-05-02 PROCEDURE — 99024 POSTOP FOLLOW-UP VISIT: CPT | Performed by: SURGERY

## 2023-05-02 PROCEDURE — 99232 SBSQ HOSP IP/OBS MODERATE 35: CPT | Performed by: INTERNAL MEDICINE

## 2023-05-02 PROCEDURE — 36600 WITHDRAWAL OF ARTERIAL BLOOD: CPT

## 2023-05-02 PROCEDURE — 6360000002 HC RX W HCPCS: Performed by: SURGERY

## 2023-05-02 PROCEDURE — APPSS15 APP SPLIT SHARED TIME 0-15 MINUTES: Performed by: NURSE PRACTITIONER

## 2023-05-02 PROCEDURE — 83690 ASSAY OF LIPASE: CPT

## 2023-05-02 RX ADMIN — PIPERACILLIN AND TAZOBACTAM 3375 MG: 3; .375 INJECTION, POWDER, LYOPHILIZED, FOR SOLUTION INTRAVENOUS at 00:32

## 2023-05-02 RX ADMIN — ACETAMINOPHEN 650 MG: 325 TABLET ORAL at 10:48

## 2023-05-02 RX ADMIN — KETOROLAC TROMETHAMINE 15 MG: 15 INJECTION, SOLUTION INTRAMUSCULAR; INTRAVENOUS at 21:55

## 2023-05-02 RX ADMIN — KETOROLAC TROMETHAMINE 15 MG: 15 INJECTION, SOLUTION INTRAMUSCULAR; INTRAVENOUS at 16:21

## 2023-05-02 RX ADMIN — ACETAMINOPHEN 650 MG: 325 TABLET ORAL at 21:55

## 2023-05-02 RX ADMIN — Medication 10 ML: at 08:27

## 2023-05-02 RX ADMIN — PIPERACILLIN AND TAZOBACTAM 3375 MG: 3; .375 INJECTION, POWDER, LYOPHILIZED, FOR SOLUTION INTRAVENOUS at 08:24

## 2023-05-02 RX ADMIN — PIPERACILLIN AND TAZOBACTAM 3375 MG: 3; .375 INJECTION, POWDER, LYOPHILIZED, FOR SOLUTION INTRAVENOUS at 16:31

## 2023-05-02 RX ADMIN — ACETAMINOPHEN 650 MG: 325 TABLET ORAL at 16:21

## 2023-05-02 RX ADMIN — KETOROLAC TROMETHAMINE 15 MG: 15 INJECTION, SOLUTION INTRAMUSCULAR; INTRAVENOUS at 08:24

## 2023-05-02 ASSESSMENT — PAIN SCALES - GENERAL
PAINLEVEL_OUTOF10: 0
PAINLEVEL_OUTOF10: 0
PAINLEVEL_OUTOF10: 1
PAINLEVEL_OUTOF10: 0
PAINLEVEL_OUTOF10: 0
PAINLEVEL_OUTOF10: 1
PAINLEVEL_OUTOF10: 0
PAINLEVEL_OUTOF10: 3

## 2023-05-02 ASSESSMENT — PAIN DESCRIPTION - ORIENTATION: ORIENTATION: MID

## 2023-05-02 ASSESSMENT — PAIN DESCRIPTION - LOCATION: LOCATION: ABDOMEN

## 2023-05-02 ASSESSMENT — PAIN DESCRIPTION - DESCRIPTORS: DESCRIPTORS: ACHING

## 2023-05-02 ASSESSMENT — PAIN SCALES - WONG BAKER
WONGBAKER_NUMERICALRESPONSE: 0

## 2023-05-02 ASSESSMENT — PAIN DESCRIPTION - PAIN TYPE: TYPE: SURGICAL PAIN

## 2023-05-02 ASSESSMENT — PAIN - FUNCTIONAL ASSESSMENT
PAIN_FUNCTIONAL_ASSESSMENT: PREVENTS OR INTERFERES SOME ACTIVE ACTIVITIES AND ADLS
PAIN_FUNCTIONAL_ASSESSMENT: PREVENTS OR INTERFERES SOME ACTIVE ACTIVITIES AND ADLS

## 2023-05-02 NOTE — PLAN OF CARE
Problem: Discharge Planning  Goal: Discharge to home or other facility with appropriate resources  Outcome: Progressing     Problem: Safety - Adult  Goal: Free from fall injury  Outcome: Progressing  Flowsheets (Taken 5/2/2023 0900)  Free From Fall Injury: Instruct family/caregiver on patient safety     Problem: ABCDS Injury Assessment  Goal: Absence of physical injury  Outcome: Progressing  Flowsheets (Taken 5/2/2023 0900)  Absence of Physical Injury: Implement safety measures based on patient assessment     Problem: Pain  Goal: Verbalizes/displays adequate comfort level or baseline comfort level  Outcome: Progressing

## 2023-05-02 NOTE — DISCHARGE INSTRUCTIONS
uncontrolled, either contact the office or present to nearest ED  - It is common to have bruising or mild redness around the wounds within the first few days after surgery. If it worsens, or starts draining, do not hesitate to contact the office  - May shower but no tub baths or swimming pools--do not submerge incisions under water    Cautions  - Watch for signs of infection, such as: fever over 100.5, excessive warmth or redness around incisions, bloody or cloudy drainage from incisions  - Watch for signs of complication: uncontrolled pain, nausea, bloating, sudden lightheadedness  - Serious problems can arise after surgery that need urgent or immediate care  - Do not hesitate to contact the office with any questions    Follow-up with your surgeon as needed. Call [phone] with any questions/concerns.

## 2023-05-02 NOTE — DISCHARGE INSTR - COC
Continuity of Care Form    Patient Name: Cristiane Gaitan   :  1935  MRN:  [id number]    Admit date:  2023  Discharge date:  2023    Code Status Order: DNR-CCA   Advance Directives:   [facility] Documentation       Date/Time Healthcare Directive Type of Healthcare Directive Copy in [address] Agent's Name Healthcare Agent's Phone Number    [phone] Yes, patient has an advance directive for healthcare treatment -- -- -- -- --            Admitting Physician:  Hakeem Dominguez MD  PCP: August Esparza MD    Discharging Nurse: [facility] Unit/Room#: 5XL-8997/7047-00  Discharging Unit Phone Number: [phone]    Emergency Contact:   Extended Emergency Contact Information  Primary Emergency Contact: Martina Wallace, [employer]  Home Phone: [phone]  Relation: Child  Secondary Emergency Contact: Chaparro Oliver  Home Phone: [phone]  Mobile Phone: [phone]  Relation: Child    Past Surgical History:  Past Surgical History:   Procedure Laterality Date    APPENDECTOMY      BLADDER SURGERY      CHOLECYSTECTOMY, LAPAROSCOPIC N/A 2023    CHOLECYSTECTOMY LAPAROSCOPIC, CHOLANGIOGRAM performed by Lata Nova MD [facility], VAGINAL  2016    vagianl hysterectomy, right salpingoophorectomy, anterior and posterior repair, paravaginal repair, uterosacral suspension, cystoscopy, transvaginal tape sling insertion    OTHER SURGICAL HISTORY  2017    SLING REMOVAL/RELEASE, AND [facility] FIT TRANSVAGINAL    RECTOCELE REPAIR      TONSILLECTOMY      UTERINE SUSPENSION      WRIST FRACTURE SURGERY Left        Immunization History:   Immunization History   Administered Date(s) Administered    COVID-19, MODERNA BLUE border, Primary or Immunocompromised, (age 12y+), IM, 100 mcg/0.5mL 2021, 2021, 2021    COVID-19, PFIZER

## 2023-05-02 NOTE — OP NOTE
[facility]                     [facility], [address] [place] [address]                                OPERATIVE REPORT    PATIENT NAME: Tony Vernon                       :        1935  MED REC NO:   [id number]                          ROOM:       [id number]  ACCOUNT NO:   [de-identified]                           ADMIT DATE: 2023  PROVIDER:     Alo Morales MD    DATE OF PROCEDURE:  2023    PREOPERATIVE DIAGNOSIS:  Acute cholecystitis. POSTOPERATIVE DIAGNOSIS:  Acute cholecystitis. PROCEDURE:  Laparoscopic cholecystectomy with intraoperative  cholangiogram.    SURGEON:  Alo Morales MD    ANESTHESIA:  General plus local.    ESTIMATED BLOOD LOSS:  Minimal.    COMPLICATIONS:  None. SPECIMEN:  Gallbladder. FINDINGS:  Gallbladder distended, thickened with pericholecystic fluid  and adhesions, all consistent with acute cholecystitis. Gallbladder was  removed laparoscopically. Cholangiogram was performed through the  cystic duct and appeared normal.    DETAILS OF SURGERY:  The patient was met in the preoperative holding bay  along with her daughter. Details of surgery were all reviewed. Antibiotics were administered per her inpatient standing orders and all  questions regarding surgery answered. Decision was made to proceed. ADDITIONAL DETAILS OF SURGERY:  The patient was brought to the operating  room, placed on the operative table in supine position. Compression  boots were placed. General anesthetic was administered. The abdomen  was prepped and draped sterilely and time-out was done. An  infraumbilical 5-mm direct entry view port was placed and the abdominal  cavity was insufflated to 15 mmHg pressure slowly. The patient  tolerated the insufflation well. The epigastric 11-mm port and two  right lateral abdominal 5-mm ports were placed under direct vision. The  upper abdomen was visualized.   The gallbladder was markedly

## 2023-05-02 NOTE — CARE COORDINATION
Case Management Assessment  Initial Evaluation    Date/Time of Evaluation: 5/2/2023 5:00 PM  Assessment Completed by: GRACE Young    If patient is discharged prior to next notation, then this note serves as note for discharge by case management. Patient Name: Polo Larson                   YOB: 1935  Diagnosis: Acute cholecystitis [K81.0]  Acalculous cholecystitis [K81.9]  Acute cystitis without hematuria [N30.00]  Altered mental status, unspecified altered mental status type [R41.82]                   Date / Time: 4/30/2023  8:57 AM    Patient Admission Status: Inpatient   Readmission Risk (Low < 19, Mod (19-27), High > 27): Readmission Risk Score: 10.3    Current PCP: Manuel Penaloza MD  PCP verified by CM? Yes    Chart Reviewed: Yes      History Provided by: Child/Family (Daughter, [place])  Patient Orientation:      Patient Cognition: Short Term Memory Deficit    Hospitalization in the last 30 days (Readmission):  No    If yes, Readmission Assessment in  Navigator will be completed. Advance Directives:      Code Status: DNR-CCA   Patient's Primary Decision Maker is: Named in [address] [place] [address]    Primary Decision Maker[facility] Center[facility]oseph Meléndez - Child - [phone]    Discharge Planning:    Patient lives with: Alone Type of Home: House  Primary Care Giver: Self  Patient Support Systems include: Children   Current Financial resources: Medicare  Current community resources:    Current services prior to admission: Durable Medical Equipment            Current DME: Matt Evon, Shower Chair            Type of Home Care services:  None    ADLS  Prior functional level: Independent in ADLs/IADLs  Current functional level: Other (see comment) (PT/OT recommended SNF)    PT AM-PAC: 17 /24  OT AM-PAC: 16 /24    Family can provide assistance at DC: Yes  Would you like Case Management to discuss the discharge plan with any other family members/significant others, and if so, who?     Plans to Return to Present

## 2023-05-02 NOTE — CONSULTS
[facility] Pulmonary and Critical Care   Consult Note      Reason for Consult: Acute hypercapnic respiratory failure/unresponsive  Requesting Physician: Jani Cain    Subjective:   CHIEF COMPLAINT: Unresponsiveness     HPI: Originally presented with upper abdominal pain associated with nausea, CT abdomen was consistent with acute cholecystitis. Patient underwent laparoscopic cholecystectomy this a.m. Postoperatively patient was complaining of severe abdominal pain and did have an element of anxiety/delirium. Received Dilaudid x2 doses-2 mg and Geodon. Following this patient has been quite obtunded and poorly responsive even to painful stimulation. Rapid response was activated, Narcan 0.4 mg x 2 doses was administered IV with some improvement in responsiveness. Patient has been placed on BiPAP and transferred to ICU for close monitoring.     History of dementia, CAD, hypertension, hyperlipidemia       The patient is a 81 y.o. female with significant past medical history of:      Diagnosis Date    Anemia     Angina at rest [facility])     Anxiety     Back pain     CAD (coronary artery disease)     Clostridium difficile diarrhea 09/2016    Heart attack ([facility] [address]     History of uterine suspension procedure     Hyperlipidemia     Hypertension     Pain     back    Urinary incontinence         Past Surgical History:        Procedure Laterality Date    APPENDECTOMY      BLADDER SURGERY      CHOLECYSTECTOMY, LAPAROSCOPIC N/A 5/1/2023    CHOLECYSTECTOMY LAPAROSCOPIC, CHOLANGIOGRAM performed by Blu Khanna MD at [facility], VAGINAL  11/17/2016    vagianl hysterectomy, right salpingoophorectomy, anterior and posterior repair, paravaginal repair, uterosacral suspension, cystoscopy, transvaginal tape sling insertion    OTHER SURGICAL HISTORY  04/06/2017    SLING REMOVAL/RELEASE, AND [facility] FIT TRANSVAGINAL    RECTOCELE REPAIR      TONSILLECTOMY

## 2023-05-03 VITALS
RESPIRATION RATE: 20 BRPM | BODY MASS INDEX: 17.39 KG/M2 | HEART RATE: 73 BPM | TEMPERATURE: 97.7 F | DIASTOLIC BLOOD PRESSURE: 70 MMHG | OXYGEN SATURATION: 97 % | SYSTOLIC BLOOD PRESSURE: 170 MMHG | HEIGHT: 65 IN | WEIGHT: 104.38 LBS

## 2023-05-03 LAB
ANION GAP SERPL CALCULATED.3IONS-SCNC: 4 MMOL/L (ref 3–16)
BASOPHILS # BLD: 0 K/UL (ref 0–0.2)
BASOPHILS NFR BLD: 0.2 %
BUN SERPL-MCNC: 26 MG/DL (ref 7–20)
CALCIUM SERPL-MCNC: 9.1 MG/DL (ref 8.3–10.6)
CHLORIDE SERPL-SCNC: 107 MMOL/L (ref 99–110)
CO2 SERPL-SCNC: 30 MMOL/L (ref 21–32)
CREAT SERPL-MCNC: 0.7 MG/DL (ref 0.6–1.2)
DEPRECATED RDW RBC AUTO: 14.1 % (ref 12.4–15.4)
EOSINOPHIL # BLD: 0 K/UL (ref 0–0.6)
EOSINOPHIL NFR BLD: 0.6 %
GFR SERPLBLD CREATININE-BSD FMLA CKD-EPI: >60 ML/MIN/{1.73_M2}
GLUCOSE SERPL-MCNC: 72 MG/DL (ref 70–99)
HCT VFR BLD AUTO: 40.5 % (ref 36–48)
HGB BLD-MCNC: 13.3 G/DL (ref 12–16)
LYMPHOCYTES # BLD: 1.3 K/UL (ref 1–5.1)
LYMPHOCYTES NFR BLD: 16.9 %
MAGNESIUM SERPL-MCNC: 2.1 MG/DL (ref 1.8–2.4)
MCH RBC QN AUTO: 29.6 PG (ref 26–34)
MCHC RBC AUTO-ENTMCNC: 32.8 G/DL (ref 31–36)
MCV RBC AUTO: 90.3 FL (ref 80–100)
MONOCYTES # BLD: 0.4 K/UL (ref 0–1.3)
MONOCYTES NFR BLD: 5.9 %
NEUTROPHILS # BLD: 5.7 K/UL (ref 1.7–7.7)
NEUTROPHILS NFR BLD: 76.4 %
PLATELET # BLD AUTO: 132 K/UL (ref 135–450)
PMV BLD AUTO: 8 FL (ref 5–10.5)
POTASSIUM SERPL-SCNC: 3.5 MMOL/L (ref 3.5–5.1)
RBC # BLD AUTO: 4.48 M/UL (ref 4–5.2)
SODIUM SERPL-SCNC: 141 MMOL/L (ref 136–145)
WBC # BLD AUTO: 7.5 K/UL (ref 4–11)

## 2023-05-03 PROCEDURE — 6370000000 HC RX 637 (ALT 250 FOR IP): Performed by: INTERNAL MEDICINE

## 2023-05-03 PROCEDURE — 80048 BASIC METABOLIC PNL TOTAL CA: CPT

## 2023-05-03 PROCEDURE — 6360000002 HC RX W HCPCS: Performed by: NURSE PRACTITIONER

## 2023-05-03 PROCEDURE — 85025 COMPLETE CBC W/AUTO DIFF WBC: CPT

## 2023-05-03 PROCEDURE — 6370000000 HC RX 637 (ALT 250 FOR IP): Performed by: SURGERY

## 2023-05-03 PROCEDURE — 6360000002 HC RX W HCPCS: Performed by: INTERNAL MEDICINE

## 2023-05-03 PROCEDURE — APPSS15 APP SPLIT SHARED TIME 0-15 MINUTES: Performed by: NURSE PRACTITIONER

## 2023-05-03 PROCEDURE — APPNB30 APP NON BILLABLE TIME 0-30 MINS: Performed by: NURSE PRACTITIONER

## 2023-05-03 PROCEDURE — 97116 GAIT TRAINING THERAPY: CPT

## 2023-05-03 PROCEDURE — 83735 ASSAY OF MAGNESIUM: CPT

## 2023-05-03 PROCEDURE — 2580000003 HC RX 258: Performed by: SURGERY

## 2023-05-03 PROCEDURE — 2580000003 HC RX 258: Performed by: NURSE PRACTITIONER

## 2023-05-03 PROCEDURE — 6360000002 HC RX W HCPCS: Performed by: SURGERY

## 2023-05-03 PROCEDURE — 36415 COLL VENOUS BLD VENIPUNCTURE: CPT

## 2023-05-03 RX ORDER — ACETAMINOPHEN 500 MG
500 TABLET ORAL EVERY 8 HOURS
Qty: 30 TABLET | Refills: 0
Start: 2023-05-03 | End: 2023-05-13

## 2023-05-03 RX ORDER — KETOROLAC TROMETHAMINE 30 MG/ML
15 INJECTION, SOLUTION INTRAMUSCULAR; INTRAVENOUS EVERY 6 HOURS
Status: DISCONTINUED | OUTPATIENT
Start: 2023-05-03 | End: 2023-05-03 | Stop reason: HOSPADM

## 2023-05-03 RX ADMIN — PIPERACILLIN AND TAZOBACTAM 3375 MG: 3; .375 INJECTION, POWDER, LYOPHILIZED, FOR SOLUTION INTRAVENOUS at 00:27

## 2023-05-03 RX ADMIN — FOLIC ACID 500 MCG: 1 TABLET ORAL at 10:07

## 2023-05-03 RX ADMIN — KETOROLAC TROMETHAMINE 15 MG: 15 INJECTION, SOLUTION INTRAMUSCULAR; INTRAVENOUS at 04:34

## 2023-05-03 RX ADMIN — ACETAMINOPHEN 650 MG: 325 TABLET ORAL at 10:07

## 2023-05-03 RX ADMIN — Medication 10 ML: at 10:19

## 2023-05-03 RX ADMIN — MEMANTINE 5 MG: 5 TABLET ORAL at 10:07

## 2023-05-03 RX ADMIN — ACETAMINOPHEN 650 MG: 325 TABLET ORAL at 04:35

## 2023-05-03 RX ADMIN — KETOROLAC TROMETHAMINE 15 MG: 15 INJECTION, SOLUTION INTRAMUSCULAR; INTRAVENOUS at 10:07

## 2023-05-03 ASSESSMENT — PAIN SCALES - GENERAL: PAINLEVEL_OUTOF10: 0

## 2023-05-03 NOTE — CARE COORDINATION
Discharge Planning:     (CM) CM called [phone] Eleazar Pugh) at [facility] of [address]  Left  with follow up request on referral.     Electronically signed by Bhavesh Alatorre on 5/3/2023 at 9:23 AM

## 2023-05-03 NOTE — CARE COORDINATION
Discharge Planning:     (JADEN) spoke with [place]. [facility] admissions staff, Artis Art ([phone]) who reported that they are not in network with patient's insurance. JADEN called patient's Daughter, Zane Quinones (on emergency contact list), and left a voicemail requesting a callback to discuss other placement options for skilled placement. CLARE Rendon, [facility] Work -   [phone]    Electronically signed by GRACE Dugan on 5/3/2023 at 12:35 PM        Update at 0:  CM spoke with Daughter and informed of above information. CM discussed limited skilled nursing facilities (SNF) who accept patient's [facility] insurance. Daughter agreed to [facility]. JADEN spoke with [facility] admissions staff, Eliseo Kebede, who reported that they are ou of network with patient's insurance. CM left Daughter a voicemail and informed of above information. CM informed that patient's insurance is in network with [place], [place]. Theodora Dee and [facility]. CM requested a callback for choice of referrals. CLARE Rendon, [facility]  [employer] Work -   [phone]    Electronically signed by GRACE Dugan on 5/3/2023 at 1:14 PM        Update at [phone]:  JADEN called patient's Daughter and informed that therapy is now scoring patient 23/24 and no longer qualifies for a skilled nursing facility. Daughter agreeable to patient [facility] [address] [place] [address]) agency that takes patient's insurance. CM educated Daughter on KAILEE Thomas for assistance with possible future patient placement needs and [facility] for other possible patient needs. Patient confirmed that she will call [facility] for any future patient needs as she is familiar with [facility]. Daughter confirmed that she will transport patient home at discharge and will assist patient in the home with her needs.     JADEN called

## 2023-05-03 NOTE — PLAN OF CARE
Problem: Discharge Planning  Goal: Discharge to home or other facility with appropriate resources  5/2/2023 2029 by Nadja Polk RN  Outcome: Progressing     Problem: Safety - Adult  Goal: Free from fall injury  5/2/2023 2029 by Nadja Polk RN  Outcome: Progressing     Problem: ABCDS Injury Assessment  Goal: Absence of physical injury  5/2/2023 2029 by Nadja Polk RN  Outcome: Progressing     Problem: Pain  Goal: Verbalizes/displays adequate comfort level or baseline comfort level  5/2/2023 2029 by Nadja Polk RN  Outcome: Progressing

## 2023-05-03 NOTE — PROGRESS NOTES
Al discharge instructions gone over with pt at [facility] time, all questions asked and answered. Pt verbalized understanding. All pt belongings with pt at this time. Peripheral IV removed, no complications, band aid applied.
Call placed to General Surgery regarding daughter and son's concerns. Awaiting a call back.
[facility] [address] Department   Phone: [phone]    Physical Therapy    [] Initial Evaluation            [x] Daily Treatment Note         [x] Discharge Summary      Patient: Miguel Valdez   : 1935   MRN: [id number]   Date of Service:  5/3/2023  Admitting Diagnosis: Acalculous cholecystitis  Current Admission Summary: pt found wandering outside, admitted with abdominal pain, had lap matthew . Post op, increased AMS  Past Medical History:  has a past medical history of Anemia, Angina at rest [facility]), Anxiety, Back pain, CAD (coronary artery disease), Clostridium difficile diarrhea, [facility] History of uterine suspension procedure, Hyperlipidemia, Hypertension, Pain, and Urinary incontinence. Past Surgical History:  has a past surgical history that includes Uterine Suspension; Colonoscopy; Appendectomy; Tonsillectomy; Hysterectomy, vaginal (2016); Rectocele repair; Cystocele repair; Cystocopy; other surgical history (2017); Bladder surgery; Wrist fracture surgery (Left); and Cholecystectomy, laparoscopic (N/A, 2023). Discharge Recommendations: Miguel Valdez scored a 23/24 on the AM-PAC short mobility form. At this time, no further PT is recommended upon discharge due to Close to baseline level of function. Recommend patient returns to prior setting with prior services and with initial 24 hour Supervision d/t memory impairment.   IDME Required For Discharge: DME to be determined at next level of care, DME to be determined pending patient progress  Precautions/Restrictions: high fall risk  Weight Bearing Restrictions: no restrictions  [] Right Upper Extremity  [] Left Upper Extremity [] Right Lower Extremity  [] Left Lower Extremity     Required Braces/Orthotics: no braces required   [] Right  [] Left  Positional Restrictions:no positional restrictions    Pre-Admission Information   Lives With: alone                     Type of Home: house  Home Layout:
[facility] [address] Department   Phone: [phone]    Occupational Therapy    [x] Initial Evaluation            [] Daily Treatment Note         [] Discharge Summary      Patient: Janett Guzman   : 1935   MRN: [id number]   Date of Service:  2023    Admitting Diagnosis:  Acalculous cholecystitis  Current Admission Summary: pt found wandering outside, admitted with abdominal pain, had lap matthew . Post op, increased AMS  Past Medical History:  has a past medical history of Anemia, Angina at rest [facility]), Anxiety, Back pain, CAD (coronary artery disease), Clostridium difficile diarrhea, Heart attack ([facility] History of uterine suspension procedure, Hyperlipidemia, Hypertension, Pain, and Urinary incontinence. Past Surgical History:  has a past surgical history that includes Uterine Suspension; Colonoscopy; Appendectomy; Tonsillectomy; Hysterectomy, vaginal (2016); Rectocele repair; Cystocele repair; Cystocopy; other surgical history (2017); Bladder surgery; Wrist fracture surgery (Left); and Cholecystectomy, laparoscopic (N/A, 2023). Discharge Recommendations: Janett Guzman scored a 17/24 on the AM-PAC ADL Inpatient form. Current research shows that an AM-PAC score of 17 or less is typically not associated with a discharge to the patient's home setting. Based on the patient's AM-PAC score and their current ADL deficits, it is recommended that the patient have 3-5 sessions per week of Occupational Therapy at d/c to increase the patient's independence. Please see assessment section for further patient specific details. If patient discharges prior to next session this note will serve as a discharge summary. Please see below for the latest assessment towards goals.       DME Required For Discharge: DME to be determined pending patient progress    Precautions/Restrictions: high fall risk  Weight Bearing Restrictions: no restrictions  [] Right Upper Extremity  []
[facility] Pulmonary/CCM Progress note      Admit Date: 4/30/2023    Chief Complaint: Unresponsiveness    Subjective: Interval History: Patient is awake and alert, appears to be at baseline. On room air. Does not complain of significant abdominal pain.     Scheduled Meds:   piperacillin-tazobactam  3,375 mg IntraVENous Q8H    ketorolac  15 mg IntraVENous Q6H    acetaminophen  650 mg Oral T2P    folic acid  281 mcg Oral Every Other Day    memantine  5 mg Oral Daily    sodium chloride flush  5-40 mL IntraVENous 2 times per day    enoxaparin  40 mg SubCUTAneous Daily     Continuous Infusions:   sodium chloride      sodium chloride 75 mL/hr at 05/02/23 0537     PRN Meds:ziprasidone, magnesium oxide, sodium chloride flush, sodium chloride, ondansetron **OR** ondansetron, polyethylene glycol, [DISCONTINUED] acetaminophen **OR** acetaminophen    Review of Systems  Constitutional: negative for fatigue, fevers, malaise and weight loss  Ears, nose, mouth, throat: negative for ear drainage, epistaxis, hoarseness, nasal congestion, sore throat and voice change  Respiratory: negative for shortness of breath, cough, phlegm or pleurisy  Cardiovascular: negative for chest pain, chest pressure/discomfort, irregular heart beat, lower extremity edema and palpitations  Gastrointestinal: negative for abdominal pain, constipation, diarrhea, jaundice, melena, odynophagia, reflux symptoms and vomiting  Hematologic/lymphatic: negative for bleeding, easy bruising, lymphadenopathy and petechiae  Musculoskeletal:negative for arthralgias, bone pain, muscle weakness, neck pain and stiff joints  Neurological: negative for dizziness, gait problems, headaches, seizures, speech problems, tremors and weakness  Behavioral/Psych: negative for anxiety, behavior problems, depression, fatigue and sleep disturbance  Endocrine: negative for diabetic symptoms including none, neuropathy, polyphagia, polyuria, polydipsia, vomiting and diarrhea and temperature
Hospitalist Progress Note      PCP: Kasia Aldrich MD    Date of Admission: 4/30/2023    Chief Complaint: Abdominal pain    Hospital Course: 81 y.o. female with history of CAD, dementia, hyperlipidemia who came to ER with complaints of abdominal pain. Admitted as inpatient for acute cholecystitis. Started on IV Zosyn. Seen by Surgery. Underwent lap matthew with IOC on 5/1/23. Post operatively, very agitated on medical floor. Required Geodon IM and restraints for agitation and Dilaudid for abdominal pain. Rapid response called later for acute metabolic encephalopathy with acute respiratory failure with hypoxia and hypercapnia. Given Narcan and started on Bipap. Transferred to ICU. [facility] consulted for critical care. Code status is DNR-CCA. Would allow intubation for short course. Daughter is POA. Subjective:  Patient is much better today. Expected post operative incisional pain. No CP, SOB, HA or fevers.       Medications:  Reviewed    Infusion Medications    sodium chloride      sodium chloride 50 mL/hr at 05/02/23 1621     Scheduled Medications    piperacillin-tazobactam  3,375 mg IntraVENous Q8H    ketorolac  15 mg IntraVENous Q6H    acetaminophen  650 mg Oral R4G    folic acid  004 mcg Oral Every Other Day    memantine  5 mg Oral Daily    sodium chloride flush  5-40 mL IntraVENous 2 times per day    enoxaparin  40 mg SubCUTAneous Daily     PRN Meds: ziprasidone, magnesium oxide, sodium chloride flush, sodium chloride, ondansetron **OR** ondansetron, polyethylene glycol, [DISCONTINUED] acetaminophen **OR** acetaminophen      Intake/Output Summary (Last 24 hours) at 5/2/2023 1930  Last data filed at 5/2/2023 0800  Gross per 24 hour   Intake 1567 ml   Output 175 ml   Net 1392 ml         Physical Exam Performed:    BP (!) 146/77   Pulse 92   Temp 98.5 °F (36.9 °C) (Oral)   Resp 16   Ht 5' 5\" (1.651 m)   Wt 104 lb 11.2 oz (47.5 kg)   SpO2 94%   BMI 17.42 kg/m²     General appearance: Awake,
Hospitalist Progress Note      PCP: Richard Leonardo MD    Date of Admission: 4/30/2023    Chief Complaint: Abdominal pain    Hospital Course: 81 y.o. female with history of CAD, dementia, hyperlipidemia who came to ER with complaints of abdominal pain. Admitted as inpatient for acute cholecystitis. Started on IV Zosyn. Seen by Surgery. Underwent lap matthew with IOC on 5/1/23. Post operatively, very agitated on medical floor. Required Geodon IM and restraints for agitation and Dilaudid for abdominal pain. Rapid response called later for acute metabolic encephalopathy with acute respiratory failure with hypoxia and hypercapnia. Given Narcan and started on Bipap. Transferred to ICU. [facility] consulted for critical care. Code status is DNR-CCA. Would allow intubation for short course. Daughter is POA. Subjective:  Patient very agitated and delirious after OR. Standing up from bed and disoriented. Complained of abdominal pain. No CP, SOB, HA. Later in evening, patient hypoxic and rapid response called.   Encephalopathic on my exam.         Medications:  Reviewed    Infusion Medications    sodium chloride      sodium chloride 75 mL/hr at 04/30/23 1712     Scheduled Medications    ketorolac  15 mg IntraVENous Q6H    acetaminophen  650 mg Oral I9H    folic acid  592 mcg Oral Every Other Day    memantine  5 mg Oral Daily    sodium chloride flush  5-40 mL IntraVENous 2 times per day    enoxaparin  40 mg SubCUTAneous Daily    piperacillin-tazobactam  3,375 mg IntraVENous Q8H     PRN Meds: ziprasidone, magnesium oxide, sodium chloride flush, sodium chloride, ondansetron **OR** ondansetron, polyethylene glycol, acetaminophen **OR** acetaminophen      Intake/Output Summary (Last 24 hours) at 5/1/2023 1728  Last data filed at 5/1/2023 1216  Gross per 24 hour   Intake 700 ml   Output --   Net 700 ml       Physical Exam Performed:    /71   Pulse 85   Temp 97.2 °F (36.2 °C) (Axillary)   Resp 24   Ht 5'
[facility] Laparoscopic Surgery        Progress Note    Patient Name: Markie Desai  MRN: [id number]  YOB: 1935  Date of Evaluation: 5/3/2023    Subjective:  No acute events overnight  Pain controlled  No nausea or vomiting, tolerating regular diet  Resting in bed at this time    Post-Operative Day #2      Vital Signs:  Patient Vitals for the past 24 hrs:   BP Temp Temp src Pulse Resp SpO2 Weight   23 0959 (!) 170/70 97.7 °F (36.5 °C) Oral 73 20 97 % --   23 0437 134/78 98.2 °F (36.8 °C) Oral 78 18 -- 104 lb 6 oz (47.3 kg)   23 0029 128/74 98 °F (36.7 °C) Oral 68 18 93 % --   23 132/78 98.2 °F (36.8 °C) Oral 88 18 93 % --        TEMPERATURE HISTORY 24H: Temp (24hrs), Av °F (36.7 °C), Min:97.7 °F (36.5 °C), Max:98.2 °F (36.8 °C)    BLOOD PRESSURE HISTORY: Systolic (99CQT), IRW:002 , Min:94 , IOD:725    Diastolic (62OSD), QTL:50, Min:56, Max:84      Intake/Output:  I/O last 3 completed shifts: In: 9930 [P.O.:120; I.V.:1215; IV Piggyback:232]  Out: 453 [Urine:915]  No intake/output data recorded.   Drain/tube Output:       Physical Exam:  General: awake, alert, loosely oriented, no acute distress  Lungs: unlabored respirations  Abdomen: soft, non-distended, mild incisional tenderness only, bowel sounds present   Skin/Wound: healing well, no drainage, no erythema, well approximated    Labs:  CBC:    Recent Labs     23  0449 23  1719 23  0639 23  0547   WBC 4.3  --  10.3 7.5   HGB 12.9 15.0 14.3 13.3   HCT 40.0  --  43.1 40.5     --  163 132*       BMP:    Recent Labs     23  0449 23  0639 23  0547    143 141   K 3.6 4.3  4.3 3.5    108 107   CO2 26 28 30   BUN 18 21* 26*   CREATININE 0.6 0.6 0.7   GLUCOSE 84 126* 72       Hepatic:    Recent Labs     23  0639   AST 19   ALT 25   BILITOT 0.8   ALKPHOS 75       Amylase:  No results found for: AMYLASE  Lipase:    Lab Results   Component
[address] [address] [facility] Laparoscopic Surgery        Progress Note    Patient Name: Nicki Fernandez  MRN: [id number]  YOB: 1935  Date of Evaluation: 5/2/2023    Subjective:  Rapid response yesterday evening for low SaO2, unresponsiveness, transferred to ICU--improved with Narcan and BiPAP  Pain controlled  No nausea or vomiting  Resting in bed at this time    Post-Operative Day #1      Vital Signs:  Patient Vitals for the past 24 hrs:   BP Temp Temp src Pulse Resp SpO2 Weight   05/02/23 1100 -- -- -- -- 16 94 % --   05/02/23 1046 (!) 146/77 98.5 °F (36.9 °C) Oral 92 14 94 % --   05/02/23 0900 94/84 -- -- 64 16 98 % --   05/02/23 0800 (!) 136/57 97.3 °F (36.3 °C) Temporal 67 22 95 % --   05/02/23 0717 -- -- -- -- -- 95 % --   05/02/23 0700 (!) 140/56 -- -- 62 24 -- --   05/02/23 0534 -- -- -- -- -- -- 104 lb 11.2 oz (47.5 kg)   05/02/23 0407 -- -- -- 56 20 94 % --   05/02/23 0043 -- -- -- 87 25 93 % --   05/02/23 0000 -- -- -- -- -- 100 % --   05/01/23 2342 -- -- -- 67 19 -- --   05/01/23 2335 -- -- -- 52 13 100 % --   05/01/23 2200 129/65 97 °F (36.1 °C) Temporal 52 14 100 % --   05/01/23 2105 -- -- -- 57 16 100 % --   05/01/23 1945 (!) 102/54 -- -- 51 13 100 % --   05/01/23 1930 (!) 99/54 97 °F (36.1 °C) Temporal 51 14 100 % --   05/01/23 1915 107/60 -- -- 79 14 100 % --   05/01/23 1900 114/70 96.9 °F (36.1 °C) Temporal 60 12 100 % --   05/01/23 1845 -- -- -- 61 21 -- --   05/01/23 1840 (!) 106/45 -- -- 82 15 100 % --   05/01/23 1830 -- -- -- 86 16 100 % --   05/01/23 1820 (!) 147/50 -- -- 71 14 100 % --   05/01/23 1815 (!) 150/60 -- -- 88 16 100 % --   05/01/23 1812 (!) 156/79 96.9 °F (36.1 °C) Temporal 90 16 100 % --   05/01/23 1811 -- -- -- -- 14 -- --   05/01/23 1809 (!) 145/77 -- -- 78 -- 100 % --   05/01/23 1719 -- -- -- -- -- 97 % --   05/01/23 1718 129/71 -- -- 85 24 97 % --   05/01/23 1637 112/66 97.2 °F (36.2 °C) Axillary 59 18 100 % --   05/01/23 1430 (!) 148/76 97.3 °F (36.3 °C)
Morning assessment completed. Pt comfortably resting in bed. Family member at the bedside. The care plan and education has been reviewed and mutually agreed upon with the patient.
Occupational Therapy/Physical Therapy  Leigha Oliver    Pt REIK to OR upon OT/PT evaluation attempt. Will f/u for OT/PT assessment as time/schedule allows.     Thanks,    Gabriela Wagner, [facility] OTR/L [id number]  Kena Del Angel, [facility]
Patient arrived from OR to PACU. Oral airway removed by CRNA upon entering PACU. On 6 L simple mask. Moving around in bed, repeatedly saying she needs to get up. Repositioned patient and reoriented patient. Lap sites to abdomen CDI.
Patient arrived to ICU, very lethargic and sleepy, responds to pain. Monitoring VS , BiPAP in place.
Patient reporting severe pain, tense, holding abdomen. Titrating dilaudid per order. Report given to [facility].
Patient sleeping and resting well in bed, all night medications administered per [facility], daughter at bedside this morning. Patient is worried about the surgery, states,\"I don't want anesthesia, it causes my memory problems\". The RN reassure the patient that the surgeon will educate her about anesthesia before giving her. Got up without waiting for help during this shift, the RN educate the patient to always call for help before getting up. NPO after midnight, VSS. The care plan and education has been reviewed and mutually agreed upon with the patient.
Pt and all pt belongings discharged at this time, wheeled to main entrance and discharged to daughter at this time.
Pt arrived to the room. Pt very agitated, angry and rude to the staff. Pt pulled her gown off of her body. Sitting on the bedpan holding 3 pillows under her belly and bending over. Complaining of 10/10 pain on her abdomen. VSS. Will continue to monitor.
Pt attempting to get out of bed and sit on the side-instructed pt she needs to remain in the bed during transport-pt refused and continue to attempt to get out of bed.   After educating the importance of remaining in the bed during transport for safety-pt remained in the bed for transport back to 
Pt is very verbally expressive, and demanding. Pt refusing to answer orientation questions stating \"my daughter knows all the answers\" attempted to educate pt on why I need her and her alone to answer orientation questions, pt became agitated, this RN did not push any further, was able to determine pt is oriented to self, pt required 20 minutes of education on why it was okay to take her scheduled tylenol, pt finally agreeable and tylenol given per [facility] for surgical pain management, pt daughter bedside, has no questions/concerns at this time. VSS, brakes locked, bed in lowest position call light within reach.
Pt left the floor for surgery.
Pt stated she is not having pain at this time and refused scheduled medication.
Pt transported via stretcher with RN/PCA
Pt tries to get out of the bed by herself, pull lines, bilateral wrists soft restraints placed. 37 called pharmacy to readjust the Zosyn as pt was off the floor for surgery. Pt in deep sleep, couldn't get her up to give daily meds.
Pt's O2 dropped to 86% s/p IVP medication, sleeping/resting comfortably-easily arousable. When pt awakens she states she is having pain-pressing on stomach-moving all over bed. Pt educated on the importance of resting to help with pain control. Pt V/U-continued to fall asleep intermittently.
Rapid Response Quick Summary    Room: [address] [place] [address] [id number]    Assessment of concern / patient:  low o2 sat and unable to arouse patient now. Physician involved:  Dr Kg Awan     Interventions:  monitoring of vitals and oxygen levels, application of a 140% NRM, ABG and lytes done per epoc,  then BiPAP applied per Makenzie from RT, 2 doses of narcan given as ordered by Dr Jorge Anton per Cayetano Perez RN, patient began briefly moving and pulling at gown taking o2 sat probe off, restraints removed when bipap applied vitals stable 02 sat 100% on bipap, patient calm RR 18 .      Disposition:  Patient transferred per bed to ICU 3903, Dr Jorge Anton called the patient's daughter to  update,
Shift assessment completed, VSS, scheduled medications given per mar, Pt is verbally aggressive this morning, pacing around room and dragging IV pole, pt refuses to allow staff to help assist, pt is yelling at staff because she believes we \"stole $100 sock\", pt refuses to listen to any questions/answers staff attempts to provide, pt back in bed with bed alarm on at this time and call light within reach.
Teaching / education initiated regarding perioperative experience, expectations, and pain management during stay. Patient verbalized understanding.
Transfers  Sit to stand transfer: contact guard assistance  Stand to sit transfer: contact guard assistance  Toilet transfer: contact guard assistance  Comments: STS from EOB, SPC used. Stand to sit to transport chair, RW used. Grab bars utilized for toilet transfer. Verbal cues provided for safety of movement but patient stating \"this is how I do it at home, you have to see how I do it at home\"  Ambulation  Surface:level surface  Assistive Device: single point cane, and RW  Assistance: contact guard assistance, minimal assistance  Distance: ~10 + 40'  Gait Mechanics: decreased arminda, decreased step length and height, decreased heel strike, instability noted. Comments:  Patient ambulates to bathroom with SPC, reaching for items to steady, requires CGA. Patient ambulates an additional 22' with SPC in hallway, requiring min A + hand held assist from therapist. Patient then ambulates remaining 13' with RW. Patient given verbal cues for RW safety and use. Patient states \"I need to see my feet to walk\" and \"do not touch me while I walk\"  Stair Mobility  Stair mobility not completed on this date. Comments:  Wheelchair Mobility:  No w/c mobility completed on this date. Comments:  Balance  Static Sitting Balance: fair (+): maintains balance at SBA/supervision without use of UE support  Dynamic Sitting Balance: fair (+): maintains balance at SBA/supervision without use of UE support  Static Standing Balance: fair (-): maintains balance at CGA with use of UE support  Dynamic Standing Balance: fair (-): maintains balance at CGA with use of UE support  Comments:    Other Therapeutic Interventions  Patient completes toileting  Patient brushes teeth standing at sink, requiring CGA.    See OT note for greater detail and level of assist   Functional Outcomes  -PAC Inpatient Mobility Raw Score : 17              Cognition  Overall Cognitive Status: Impaired  Following Commands: follows one step commands with repetition,

## 2023-05-04 ENCOUNTER — TELEPHONE (OUTPATIENT)
Dept: INTERNAL MEDICINE CLINIC | Age: 88
End: 2023-05-04

## 2023-05-04 ENCOUNTER — CARE COORDINATION (OUTPATIENT)
Dept: CASE MANAGEMENT | Age: 88
End: 2023-05-04

## 2023-05-04 ENCOUNTER — TELEPHONE (OUTPATIENT)
Dept: CASE MANAGEMENT | Age: 88
End: 2023-05-04

## 2023-05-04 NOTE — DISCHARGE SUMMARY
Hospital Medicine Discharge Summary    Patient: Kaylene Gutierrez     Gender: female  : 1935   Age: 81 y.o. MRN: [id number]    Admitting Physician: Socorro Rosario MD  Discharge Physician: Socorro Rosario MD     Code Status: DNR-CCA    Admit Date: 2023   Discharge Date: 5/3/2023      Disposition:  Home    Discharge Diagnoses: Active Hospital Problems    Diagnosis Date Noted    Acute metabolic encephalopathy [F23.77] 2023    Acute respiratory failure with hypercapnia (HCC) [J96.02] 2023    Altered mental status [R41.82] 2023    Acalculous cholecystitis [K81.9] 2023    Dementia ([address] [F03.90] 2023    Acute cholecystitis [K81.0] 2023    Mixed hyperlipidemia [E78.2] 2016    Coronary artery disease involving native coronary artery without angina pectoris [I25.10] 2016       Follow-up appointments:  one week    Outpatient to do list: F/U with PCP and Surgery    Condition at Discharge:  Stable    Hospital Course:   81 y.o. female with history of CAD, dementia, hyperlipidemia who came to ER with complaints of abdominal pain. Admitted as inpatient for acute cholecystitis. Started on IV Zosyn. Seen by Surgery. Underwent lap matthew with IOC on 23. Post operatively, very agitated on medical floor. Required Geodon IM and restraints for agitation and Dilaudid for abdominal pain. Rapid response called later for acute metabolic encephalopathy with acute respiratory failure with hypoxia and hypercapnia. Given Narcan and started on Bipap. Transferred to ICU. [facility] consulted for critical care. Code status is DNR-CCA. Would allow intubation for short course. Daughter is POA. Will go home with home care. F/U with PCP and Surgery.     Discharge Medications:   Discharge Medication List as of 5/3/2023  4:45 PM        START taking these medications    Details   acetaminophen (TYLENOL) 500 MG tablet Take 1 tablet by mouth every 8 (eight) hours for 10 days, Disp-30

## 2023-05-04 NOTE — TELEPHONE ENCOUNTER
(CM) informed by by Care Transition Coordinator, Mayur Lockhart, via email that [facility]) was unable to service patient due to being out of network with patient's insurance. CM called [facility] staff, [facility] ([phone]). [facility] reported that they are out of network with patient's insurance. CM called [employer] ([phone]), and spoke with staff Ulisses Anaya who reported that they are out of network with patient's insurance. The following [facility] companies are out of network:    - Alissa So [facility] staff, India Celaya ([phone]), who accepted patient's referral and confirmed they are in network with patient's insurance.       Jaime EDWARDS, [facility], [facility] -   [phone]    Electronically signed by GRACE Allison on 5/4/2023 at 4:26 PM

## 2023-05-04 NOTE — CARE COORDINATION
[facility] liaison called and confirmed that they do not accept [employer]. CTN reached out to [facility] [phone], left a message on their secure VM requesting information about whether or not they accept [employer]. CTN left contact information and request for return call. CTN will remain available. CTN attempted to send Fax to [facility], was unable to get through, Esperanza with [employer] explained \"I have been using the Fax all day and it is working\". CTN contacted [facility], they do not accept this patient's insurance. CTN sent e-mail to SW and CM supervisor requesting assistance in establishing [place] [phone] for this patient. CTN will remain available.

## 2023-05-04 NOTE — CARE COORDINATION
ASHLEY received e-mail from [facility] confirming that she received message requesting assistance with establishing home care for this patient. CTN will remain available.

## 2023-05-04 NOTE — CARE COORDINATION
REHABILITATION [facility] Care Transitions Initial Follow Up Call    Call within 2 business days of discharge: Yes    First attempt at 24 hour discharge call, no answer, CTN left  with contact information and request for return call. CTN will continue with outreach call attempts. CTN contacted Kamilah Ortega with [address] [place], she does not have orders for home care and does not take [employer]. CTN reached out to [facility], faxed all information regarding referral to [phone]. Frida with [facility] is going to run insurance and determine if they can take this patient. CTN will remain available and follow up as indicated. Frida called and confirmed that [facility] cannot take this patient's insurance. CTN left  for [facility] liaison to see if they accept patient's insurance. CTN will remain available.       Follow Up  Future Appointments   Date Time Provider Jai Winter   6/7/2023  2:30 PM Tutu Garduno MD FF VASC/INA MMA   6/7/2023  4:00 PM MD LALO Samuels  [place] - DYD   1/26/2024  4:00 PM MD LALO Samuels IM [place] - [facility]       Tung Phipps, RN

## 2023-05-04 NOTE — TELEPHONE ENCOUNTER
Care Transitions Initial Follow Up Call    Outreach made within 2 business days of discharge: Yes    Patient: Alice Cuevas Patient : 1935   MRN: [id number]  Reason for Admission: There are no discharge diagnoses documented for the most recent discharge. Discharge Date: 5/3/23       Spoke with:  Patient and daughter, Jason Gale    Discharge department/facility: [place]    TCM Interactive Patient Contact:  Was patient able to fill all prescriptions: Yes  Was patient instructed to bring all medications to the follow-up visit: Yes  Is patient taking all medications as directed in the discharge summary?  Yes  Does patient understand their discharge instructions: Yes  Does patient have questions or concerns that need addressed prior to 7-14 day follow up office visit: no     Scheduled appointment with PCP within 7-14 days    Follow Up  Future Appointments   Date Time Provider Jai Winter   2023  2:30 PM Erick Romberg, MD  VASC/ENDO MMA   2023  4:00 PM Carolina Diaz MD [facility] DOB[facility]   2024  4:00 PM MD Connie Matos LPN

## 2023-05-05 ENCOUNTER — CARE COORDINATION (OUTPATIENT)
Dept: CASE MANAGEMENT | Age: 88
End: 2023-05-05

## 2023-05-05 NOTE — CARE COORDINATION
[facility] Care Transitions Initial Follow Up Call    Call within 2 business days of discharge: Yes    Second & final attempt at 24 hour discharge call, no answer, VM not available. CTN will route message to patient, resolve episode & remain available. Patient has PCP follow up scheduled for 5/11/23. CTN reached out to [facility], left VM for \"Dixie\" , provided contact information and requested return call to confirm [address] orders have been received. Patsy Gaines returned call and confirmed that orders for [address] were received and they have everything that they need for start of care.       Follow Up  Future Appointments   Date Time Provider Jai Winter   5/11/2023  3:40 PM MD LALO Reynolds IM [place] - [facility]   6/7/2023  2:30 PM Adrian Catherine MD  VASC/ENDO MMA   6/7/2023  4:00 PM M MD LALO Boyd IM [place] - DYTALON   1/26/2024  4:00 PM MD LALO Bustillos IM [place] - [facility]       James Sanchez, RN

## 2023-05-18 ENCOUNTER — OFFICE VISIT (OUTPATIENT)
Dept: INTERNAL MEDICINE CLINIC | Age: 88
End: 2023-05-18
Payer: MEDICARE

## 2023-05-18 VITALS
OXYGEN SATURATION: 97 % | DIASTOLIC BLOOD PRESSURE: 80 MMHG | HEIGHT: 65 IN | SYSTOLIC BLOOD PRESSURE: 132 MMHG | BODY MASS INDEX: 17.4 KG/M2 | WEIGHT: 104.4 LBS | HEART RATE: 96 BPM

## 2023-05-18 DIAGNOSIS — D69.6 THROMBOCYTOPENIA, UNSPECIFIED (HCC): ICD-10-CM

## 2023-05-18 DIAGNOSIS — R41.89 COGNITIVE IMPAIRMENT: ICD-10-CM

## 2023-05-18 DIAGNOSIS — N39.0 URINARY TRACT INFECTION WITHOUT HEMATURIA, SITE UNSPECIFIED: Primary | ICD-10-CM

## 2023-05-18 DIAGNOSIS — Z09 HOSPITAL DISCHARGE FOLLOW-UP: ICD-10-CM

## 2023-05-18 DIAGNOSIS — R30.0 DYSURIA: ICD-10-CM

## 2023-05-18 DIAGNOSIS — N39.0 URINARY TRACT INFECTION WITHOUT HEMATURIA, SITE UNSPECIFIED: ICD-10-CM

## 2023-05-18 DIAGNOSIS — Z90.49 S/P CHOLECYSTECTOMY: ICD-10-CM

## 2023-05-18 LAB
ANION GAP SERPL CALCULATED.3IONS-SCNC: 10 MMOL/L (ref 3–16)
BACTERIA URNS QL MICRO: ABNORMAL /HPF
BILIRUB UR QL STRIP.AUTO: NEGATIVE
BUN SERPL-MCNC: 32 MG/DL (ref 7–20)
CALCIUM OXALATE CRYSTALS: PRESENT
CALCIUM SERPL-MCNC: 9.8 MG/DL (ref 8.3–10.6)
CHLORIDE SERPL-SCNC: 104 MMOL/L (ref 99–110)
CLARITY UR: CLEAR
CO2 SERPL-SCNC: 27 MMOL/L (ref 21–32)
COLOR UR: YELLOW
CREAT SERPL-MCNC: 0.7 MG/DL (ref 0.6–1.2)
DEPRECATED RDW RBC AUTO: 14.4 % (ref 12.4–15.4)
EPI CELLS #/AREA URNS AUTO: 4 /HPF (ref 0–5)
GFR SERPLBLD CREATININE-BSD FMLA CKD-EPI: >60 ML/MIN/{1.73_M2}
GLUCOSE SERPL-MCNC: 101 MG/DL (ref 70–99)
GLUCOSE UR STRIP.AUTO-MCNC: NEGATIVE MG/DL
HCT VFR BLD AUTO: 40.9 % (ref 36–48)
HGB BLD-MCNC: 13.8 G/DL (ref 12–16)
HGB UR QL STRIP.AUTO: NEGATIVE
HYALINE CASTS #/AREA URNS AUTO: 1 /LPF (ref 0–8)
KETONES UR STRIP.AUTO-MCNC: ABNORMAL MG/DL
LEUKOCYTE ESTERASE UR QL STRIP.AUTO: ABNORMAL
MCH RBC QN AUTO: 30.7 PG (ref 26–34)
MCHC RBC AUTO-ENTMCNC: 33.7 G/DL (ref 31–36)
MCV RBC AUTO: 91.1 FL (ref 80–100)
NITRITE UR QL STRIP.AUTO: NEGATIVE
PH UR STRIP.AUTO: 5.5 [PH] (ref 5–8)
PLATELET # BLD AUTO: 209 K/UL (ref 135–450)
PMV BLD AUTO: 9 FL (ref 5–10.5)
POTASSIUM SERPL-SCNC: 4 MMOL/L (ref 3.5–5.1)
PROT UR STRIP.AUTO-MCNC: ABNORMAL MG/DL
RBC # BLD AUTO: 4.49 M/UL (ref 4–5.2)
RBC CLUMPS #/AREA URNS AUTO: 4 /HPF (ref 0–4)
SODIUM SERPL-SCNC: 141 MMOL/L (ref 136–145)
SP GR UR STRIP.AUTO: 1.02 (ref 1–1.03)
UA COMPLETE W REFLEX CULTURE PNL UR: YES
UA DIPSTICK W REFLEX MICRO PNL UR: YES
URN SPEC COLLECT METH UR: ABNORMAL
UROBILINOGEN UR STRIP-ACNC: 0.2 E.U./DL
WBC # BLD AUTO: 4.4 K/UL (ref 4–11)
WBC #/AREA URNS AUTO: 18 /HPF (ref 0–5)

## 2023-05-18 PROCEDURE — 1123F ACP DISCUSS/DSCN MKR DOCD: CPT | Performed by: INTERNAL MEDICINE

## 2023-05-18 PROCEDURE — 99214 OFFICE O/P EST MOD 30 MIN: CPT | Performed by: INTERNAL MEDICINE

## 2023-05-18 RX ORDER — MEMANTINE HYDROCHLORIDE 5 MG/1
5 TABLET ORAL 2 TIMES DAILY
Qty: 180 TABLET | Refills: 0 | Status: SHIPPED | OUTPATIENT
Start: 2023-05-18

## 2023-05-18 ASSESSMENT — ENCOUNTER SYMPTOMS
NAUSEA: 0
PHOTOPHOBIA: 0
VOMITING: 0
ABDOMINAL PAIN: 0
DIARRHEA: 0
VOICE CHANGE: 0
TROUBLE SWALLOWING: 0
SHORTNESS OF BREATH: 0
CONSTIPATION: 0

## 2023-05-18 NOTE — PROGRESS NOTES
Star Lopez  1935  female  81 y.o. SUBJECTIVE:       Chief Complaint   Patient presents with    Follow-Up from Hospital       HPI:  Patient was recently admitted to the hospital for acute cholecystitis. Status post cholecystectomy. While in the hospital she had metabolic encephalopathy. Since hospital discharge patient started Namenda. She is taking 5 mg/day. Patient continue to suffer from dementia. Patient continued to complain of urinary urgency frequency burning. Recent lab work done in the hospital have been reviewed    Past Medical History:   Diagnosis Date    Anemia     Angina at rest [facility])     Anxiety     Back pain     CAD (coronary artery disease)     Clostridium difficile diarrhea 09/2016    Heart attack ([facility] [address]     History of uterine suspension procedure     Hyperlipidemia     Hypertension     Pain     back    Urinary incontinence      Past Surgical History:   Procedure Laterality Date    APPENDECTOMY      BLADDER SURGERY      CHOLECYSTECTOMY, LAPAROSCOPIC N/A 5/1/2023    CHOLECYSTECTOMY LAPAROSCOPIC, CHOLANGIOGRAM performed by Vandana Pisano MD at [facility] [address], VAGINAL  11/17/2016    vagianl hysterectomy, right salpingoophorectomy, anterior and posterior repair, paravaginal repair, uterosacral suspension, cystoscopy, transvaginal tape sling insertion    OTHER SURGICAL HISTORY  04/06/2017    SLING REMOVAL/RELEASE, AND [employer] ADVANTAGE FIT TRANSVAGINAL    RECTOCELE REPAIR      TONSILLECTOMY      UTERINE SUSPENSION      WRIST FRACTURE SURGERY Left      Social History     Socioeconomic History    Marital status:      Highest education level: Some college, no degree   Occupational History    Occupation: [employer]     Employer: RETIRED     Comment: didn't finish nursing school   Tobacco Use    Smoking status: Former     Packs/day: 1.00     Years: 15.00     Pack years: 15.00     Types: Cigarettes     Quit date:

## 2023-05-19 NOTE — RESULT ENCOUNTER NOTE
Pending urine culture. Slightly elevated BUN. Encourage hydration.   No longer have low blood  platelet count

## 2023-05-21 LAB
BACTERIA UR CULT: ABNORMAL
ORGANISM: ABNORMAL

## 2023-05-22 DIAGNOSIS — R41.89 COGNITIVE IMPAIRMENT: ICD-10-CM

## 2023-05-22 DIAGNOSIS — N39.0 URINARY TRACT INFECTION WITHOUT HEMATURIA, SITE UNSPECIFIED: ICD-10-CM

## 2023-05-22 RX ORDER — CEFUROXIME AXETIL 250 MG/1
250 TABLET ORAL 2 TIMES DAILY
Qty: 14 TABLET | Refills: 0 | Status: SHIPPED | OUTPATIENT
Start: 2023-05-22 | End: 2023-05-29

## 2023-05-22 RX ORDER — MEMANTINE HYDROCHLORIDE 5 MG/1
TABLET ORAL
Qty: 180 TABLET | Refills: 0 | OUTPATIENT
Start: 2023-05-22

## 2023-06-07 ENCOUNTER — OFFICE VISIT (OUTPATIENT)
Dept: VASCULAR SURGERY | Age: 88
End: 2023-06-07
Payer: MEDICARE

## 2023-06-07 VITALS — HEIGHT: 65 IN | WEIGHT: 104 LBS | BODY MASS INDEX: 17.33 KG/M2

## 2023-06-07 DIAGNOSIS — L97.909 VENOUS ULCER (HCC): ICD-10-CM

## 2023-06-07 DIAGNOSIS — I83.009 VENOUS ULCER (HCC): ICD-10-CM

## 2023-06-07 DIAGNOSIS — I83.893 SYMPTOMATIC VARICOSE VEINS OF BOTH LOWER EXTREMITIES: Primary | ICD-10-CM

## 2023-06-07 PROCEDURE — 1123F ACP DISCUSS/DSCN MKR DOCD: CPT | Performed by: SURGERY

## 2023-06-07 PROCEDURE — 99212 OFFICE O/P EST SF 10 MIN: CPT | Performed by: SURGERY

## 2023-06-07 NOTE — PROGRESS NOTES
Seen for L leg venous stasis changes and follow up. No new ulcerations  Wearing B KH stockings daily. Accompanied by daughter. EXAM:  Somewhat confused   Edema well controlled. Gaitor erythema and induration controlled    Wounds remain healed    All VVs soft, nontender    A/P: Chronic venous insufficiency B legs with varicose veins  Venous stasis changes B legs - R CEAP C4a, L CEAP C5 - controlled  Stable with compression and steroid cream  Continue stocking use daily. New measurements taken and plan to provide through VS.  Elevate when not walking. Elevate FOB. F/U prn. No plans for intervention at this time. Pt and daughter understand and agree.

## 2023-07-27 ENCOUNTER — HOSPITAL ENCOUNTER (INPATIENT)
Age: 88
LOS: 6 days | Discharge: NURSING, REHAB OR LONG-TERM CARE | DRG: 350 | End: 2023-08-02
Attending: EMERGENCY MEDICINE | Admitting: INTERNAL MEDICINE
Payer: MEDICARE

## 2023-07-27 ENCOUNTER — APPOINTMENT (OUTPATIENT)
Dept: CT IMAGING | Age: 88
DRG: 350 | End: 2023-07-27
Payer: MEDICARE

## 2023-07-27 ENCOUNTER — APPOINTMENT (OUTPATIENT)
Dept: GENERAL RADIOLOGY | Age: 88
DRG: 350 | End: 2023-07-27
Payer: MEDICARE

## 2023-07-27 DIAGNOSIS — K56.609 SMALL BOWEL OBSTRUCTION (HCC): Primary | ICD-10-CM

## 2023-07-27 DIAGNOSIS — A41.9 SEPSIS WITHOUT ACUTE ORGAN DYSFUNCTION, DUE TO UNSPECIFIED ORGANISM (HCC): ICD-10-CM

## 2023-07-27 PROBLEM — R10.9 ABDOMINAL PAIN: Status: ACTIVE | Noted: 2023-07-27

## 2023-07-27 LAB
ALBUMIN SERPL-MCNC: 4.2 G/DL (ref 3.4–5)
ALBUMIN/GLOB SERPL: 1.4 {RATIO} (ref 1.1–2.2)
ALP SERPL-CCNC: 79 U/L (ref 40–129)
ALT SERPL-CCNC: 9 U/L (ref 10–40)
ANION GAP SERPL CALCULATED.3IONS-SCNC: 9 MMOL/L (ref 3–16)
APTT BLD: 24.8 SEC (ref 22.7–35.9)
AST SERPL-CCNC: 8 U/L (ref 15–37)
BACTERIA URNS QL MICRO: ABNORMAL /HPF
BASOPHILS # BLD: 0 K/UL (ref 0–0.2)
BASOPHILS NFR BLD: 0.5 %
BILIRUB SERPL-MCNC: 0.4 MG/DL (ref 0–1)
BILIRUB UR QL STRIP.AUTO: NEGATIVE
BUN SERPL-MCNC: 23 MG/DL (ref 7–20)
CALCIUM SERPL-MCNC: 10.1 MG/DL (ref 8.3–10.6)
CHLORIDE SERPL-SCNC: 98 MMOL/L (ref 99–110)
CLARITY UR: ABNORMAL
CO2 SERPL-SCNC: 30 MMOL/L (ref 21–32)
COLOR UR: YELLOW
CREAT SERPL-MCNC: 0.8 MG/DL (ref 0.6–1.2)
DEPRECATED RDW RBC AUTO: 14.3 % (ref 12.4–15.4)
EKG ATRIAL RATE: 81 BPM
EKG DIAGNOSIS: NORMAL
EKG P-R INTERVAL: 176 MS
EKG Q-T INTERVAL: 384 MS
EKG QRS DURATION: 90 MS
EKG QTC CALCULATION (BAZETT): 446 MS
EKG R AXIS: -43 DEGREES
EKG T AXIS: 67 DEGREES
EKG VENTRICULAR RATE: 81 BPM
EOSINOPHIL # BLD: 0.1 K/UL (ref 0–0.6)
EOSINOPHIL NFR BLD: 1.6 %
EPI CELLS #/AREA URNS AUTO: 0 /HPF (ref 0–5)
GFR SERPLBLD CREATININE-BSD FMLA CKD-EPI: >60 ML/MIN/{1.73_M2}
GLUCOSE SERPL-MCNC: 106 MG/DL (ref 70–99)
GLUCOSE UR STRIP.AUTO-MCNC: NEGATIVE MG/DL
HCT VFR BLD AUTO: 47.7 % (ref 36–48)
HGB BLD-MCNC: 15.4 G/DL (ref 12–16)
HGB UR QL STRIP.AUTO: NEGATIVE
HYALINE CASTS #/AREA URNS AUTO: 0 /LPF (ref 0–8)
INR PPP: 0.91 (ref 0.84–1.16)
KETONES UR STRIP.AUTO-MCNC: NEGATIVE MG/DL
LACTATE BLDV-SCNC: 2 MMOL/L (ref 0.4–1.9)
LACTATE BLDV-SCNC: 2.3 MMOL/L (ref 0.4–1.9)
LACTATE BLDV-SCNC: 3.3 MMOL/L (ref 0.4–2)
LEUKOCYTE ESTERASE UR QL STRIP.AUTO: ABNORMAL
LIPASE SERPL-CCNC: 48 U/L (ref 13–60)
LYMPHOCYTES # BLD: 1.2 K/UL (ref 1–5.1)
LYMPHOCYTES NFR BLD: 19.5 %
MCH RBC QN AUTO: 29.4 PG (ref 26–34)
MCHC RBC AUTO-ENTMCNC: 32.3 G/DL (ref 31–36)
MCV RBC AUTO: 91 FL (ref 80–100)
MONOCYTES # BLD: 0.4 K/UL (ref 0–1.3)
MONOCYTES NFR BLD: 6.3 %
NEUTROPHILS # BLD: 4.4 K/UL (ref 1.7–7.7)
NEUTROPHILS NFR BLD: 72.1 %
NITRITE UR QL STRIP.AUTO: NEGATIVE
PH UR STRIP.AUTO: 8.5 [PH] (ref 5–8)
PLATELET # BLD AUTO: 211 K/UL (ref 135–450)
PMV BLD AUTO: 8.2 FL (ref 5–10.5)
POTASSIUM SERPL-SCNC: 4.1 MMOL/L (ref 3.5–5.1)
PROT SERPL-MCNC: 7.2 G/DL (ref 6.4–8.2)
PROT UR STRIP.AUTO-MCNC: NEGATIVE MG/DL
PROTHROMBIN TIME: 12.3 SEC (ref 11.5–14.8)
RBC # BLD AUTO: 5.24 M/UL (ref 4–5.2)
RBC CLUMPS #/AREA URNS AUTO: 0 /HPF (ref 0–4)
SODIUM SERPL-SCNC: 137 MMOL/L (ref 136–145)
SP GR UR STRIP.AUTO: 1.01 (ref 1–1.03)
UA COMPLETE W REFLEX CULTURE PNL UR: ABNORMAL
UA DIPSTICK W REFLEX MICRO PNL UR: YES
URN SPEC COLLECT METH UR: ABNORMAL
UROBILINOGEN UR STRIP-ACNC: 0.2 E.U./DL
WBC # BLD AUTO: 6.1 K/UL (ref 4–11)
WBC #/AREA URNS AUTO: 1 /HPF (ref 0–5)

## 2023-07-27 PROCEDURE — 2580000003 HC RX 258: Performed by: SURGERY

## 2023-07-27 PROCEDURE — 93005 ELECTROCARDIOGRAM TRACING: CPT | Performed by: PHYSICIAN ASSISTANT

## 2023-07-27 PROCEDURE — 6360000002 HC RX W HCPCS: Performed by: SURGERY

## 2023-07-27 PROCEDURE — 96375 TX/PRO/DX INJ NEW DRUG ADDON: CPT

## 2023-07-27 PROCEDURE — 2580000003 HC RX 258: Performed by: INTERNAL MEDICINE

## 2023-07-27 PROCEDURE — 6360000004 HC RX CONTRAST MEDICATION: Performed by: PHYSICIAN ASSISTANT

## 2023-07-27 PROCEDURE — 93010 ELECTROCARDIOGRAM REPORT: CPT | Performed by: INTERNAL MEDICINE

## 2023-07-27 PROCEDURE — 80053 COMPREHEN METABOLIC PANEL: CPT

## 2023-07-27 PROCEDURE — 2060000000 HC ICU INTERMEDIATE R&B

## 2023-07-27 PROCEDURE — 85730 THROMBOPLASTIN TIME PARTIAL: CPT

## 2023-07-27 PROCEDURE — 2580000003 HC RX 258: Performed by: PHYSICIAN ASSISTANT

## 2023-07-27 PROCEDURE — 96361 HYDRATE IV INFUSION ADD-ON: CPT

## 2023-07-27 PROCEDURE — 96374 THER/PROPH/DIAG INJ IV PUSH: CPT

## 2023-07-27 PROCEDURE — 83605 ASSAY OF LACTIC ACID: CPT

## 2023-07-27 PROCEDURE — 85610 PROTHROMBIN TIME: CPT

## 2023-07-27 PROCEDURE — APPNB30 APP NON BILLABLE TIME 0-30 MINS: Performed by: NURSE PRACTITIONER

## 2023-07-27 PROCEDURE — 36415 COLL VENOUS BLD VENIPUNCTURE: CPT

## 2023-07-27 PROCEDURE — 87040 BLOOD CULTURE FOR BACTERIA: CPT

## 2023-07-27 PROCEDURE — 81001 URINALYSIS AUTO W/SCOPE: CPT

## 2023-07-27 PROCEDURE — 74177 CT ABD & PELVIS W/CONTRAST: CPT

## 2023-07-27 PROCEDURE — 83690 ASSAY OF LIPASE: CPT

## 2023-07-27 PROCEDURE — 6360000002 HC RX W HCPCS: Performed by: INTERNAL MEDICINE

## 2023-07-27 PROCEDURE — 85025 COMPLETE CBC W/AUTO DIFF WBC: CPT

## 2023-07-27 PROCEDURE — 99285 EMERGENCY DEPT VISIT HI MDM: CPT

## 2023-07-27 PROCEDURE — 74018 RADEX ABDOMEN 1 VIEW: CPT

## 2023-07-27 PROCEDURE — 6360000002 HC RX W HCPCS: Performed by: PHYSICIAN ASSISTANT

## 2023-07-27 RX ORDER — SODIUM CHLORIDE 0.9 % (FLUSH) 0.9 %
5-40 SYRINGE (ML) INJECTION EVERY 12 HOURS SCHEDULED
Status: DISCONTINUED | OUTPATIENT
Start: 2023-07-27 | End: 2023-08-02 | Stop reason: HOSPADM

## 2023-07-27 RX ORDER — SODIUM CHLORIDE, SODIUM LACTATE, POTASSIUM CHLORIDE, CALCIUM CHLORIDE 600; 310; 30; 20 MG/100ML; MG/100ML; MG/100ML; MG/100ML
INJECTION, SOLUTION INTRAVENOUS CONTINUOUS
Status: DISCONTINUED | OUTPATIENT
Start: 2023-07-27 | End: 2023-07-30

## 2023-07-27 RX ORDER — ONDANSETRON 4 MG/1
4 TABLET, ORALLY DISINTEGRATING ORAL EVERY 8 HOURS PRN
Status: DISCONTINUED | OUTPATIENT
Start: 2023-07-27 | End: 2023-08-02 | Stop reason: HOSPADM

## 2023-07-27 RX ORDER — 0.9 % SODIUM CHLORIDE 0.9 %
500 INTRAVENOUS SOLUTION INTRAVENOUS ONCE
Status: DISCONTINUED | OUTPATIENT
Start: 2023-07-27 | End: 2023-07-27

## 2023-07-27 RX ORDER — MEMANTINE HYDROCHLORIDE 5 MG/1
5 TABLET ORAL 2 TIMES DAILY
Status: DISCONTINUED | OUTPATIENT
Start: 2023-07-27 | End: 2023-08-02 | Stop reason: HOSPADM

## 2023-07-27 RX ORDER — ONDANSETRON 2 MG/ML
4 INJECTION INTRAMUSCULAR; INTRAVENOUS ONCE
Status: COMPLETED | OUTPATIENT
Start: 2023-07-27 | End: 2023-07-27

## 2023-07-27 RX ORDER — LORAZEPAM 2 MG/ML
0.5 INJECTION INTRAMUSCULAR ONCE
Status: COMPLETED | OUTPATIENT
Start: 2023-07-27 | End: 2023-07-27

## 2023-07-27 RX ORDER — ONDANSETRON 2 MG/ML
4 INJECTION INTRAMUSCULAR; INTRAVENOUS EVERY 6 HOURS PRN
Status: DISCONTINUED | OUTPATIENT
Start: 2023-07-27 | End: 2023-08-02 | Stop reason: HOSPADM

## 2023-07-27 RX ORDER — 0.9 % SODIUM CHLORIDE 0.9 %
500 INTRAVENOUS SOLUTION INTRAVENOUS ONCE
Status: DISCONTINUED | OUTPATIENT
Start: 2023-07-27 | End: 2023-07-27 | Stop reason: SDUPTHER

## 2023-07-27 RX ORDER — SODIUM CHLORIDE 9 MG/ML
INJECTION, SOLUTION INTRAVENOUS CONTINUOUS
Status: DISCONTINUED | OUTPATIENT
Start: 2023-07-27 | End: 2023-07-27

## 2023-07-27 RX ORDER — MORPHINE SULFATE 2 MG/ML
1 INJECTION, SOLUTION INTRAMUSCULAR; INTRAVENOUS EVERY 4 HOURS PRN
Status: DISCONTINUED | OUTPATIENT
Start: 2023-07-27 | End: 2023-07-31

## 2023-07-27 RX ORDER — ACETAMINOPHEN 325 MG/1
650 TABLET ORAL EVERY 6 HOURS PRN
Status: DISCONTINUED | OUTPATIENT
Start: 2023-07-27 | End: 2023-07-28

## 2023-07-27 RX ORDER — ACETAMINOPHEN 650 MG/1
650 SUPPOSITORY RECTAL EVERY 6 HOURS PRN
Status: DISCONTINUED | OUTPATIENT
Start: 2023-07-27 | End: 2023-07-28

## 2023-07-27 RX ORDER — MIDAZOLAM HYDROCHLORIDE 2 MG/2ML
2.5 INJECTION, SOLUTION INTRAMUSCULAR; INTRAVENOUS ONCE
Status: COMPLETED | OUTPATIENT
Start: 2023-07-27 | End: 2023-07-27

## 2023-07-27 RX ORDER — SODIUM CHLORIDE 9 MG/ML
INJECTION, SOLUTION INTRAVENOUS PRN
Status: DISCONTINUED | OUTPATIENT
Start: 2023-07-27 | End: 2023-08-02 | Stop reason: HOSPADM

## 2023-07-27 RX ORDER — POLYETHYLENE GLYCOL 3350 17 G/17G
17 POWDER, FOR SOLUTION ORAL DAILY PRN
Status: DISCONTINUED | OUTPATIENT
Start: 2023-07-27 | End: 2023-08-02 | Stop reason: HOSPADM

## 2023-07-27 RX ORDER — HYDROMORPHONE HYDROCHLORIDE 1 MG/ML
0.5 INJECTION, SOLUTION INTRAMUSCULAR; INTRAVENOUS; SUBCUTANEOUS ONCE
Status: COMPLETED | OUTPATIENT
Start: 2023-07-27 | End: 2023-07-27

## 2023-07-27 RX ORDER — 0.9 % SODIUM CHLORIDE 0.9 %
1000 INTRAVENOUS SOLUTION INTRAVENOUS ONCE
Status: COMPLETED | OUTPATIENT
Start: 2023-07-27 | End: 2023-07-27

## 2023-07-27 RX ORDER — ENOXAPARIN SODIUM 100 MG/ML
30 INJECTION SUBCUTANEOUS DAILY
Status: DISCONTINUED | OUTPATIENT
Start: 2023-07-27 | End: 2023-08-02 | Stop reason: HOSPADM

## 2023-07-27 RX ORDER — MV-MIN/FA/VIT K/LUTEIN/ZEAXANT 200MCG-5MG
1 CAPSULE ORAL DAILY
COMMUNITY

## 2023-07-27 RX ORDER — SODIUM CHLORIDE 0.9 % (FLUSH) 0.9 %
5-40 SYRINGE (ML) INJECTION PRN
Status: DISCONTINUED | OUTPATIENT
Start: 2023-07-27 | End: 2023-08-02 | Stop reason: HOSPADM

## 2023-07-27 RX ORDER — SODIUM CHLORIDE, SODIUM LACTATE, POTASSIUM CHLORIDE, AND CALCIUM CHLORIDE .6; .31; .03; .02 G/100ML; G/100ML; G/100ML; G/100ML
500 INJECTION, SOLUTION INTRAVENOUS ONCE
Status: DISCONTINUED | OUTPATIENT
Start: 2023-07-27 | End: 2023-08-01

## 2023-07-27 RX ADMIN — SODIUM CHLORIDE, POTASSIUM CHLORIDE, SODIUM LACTATE AND CALCIUM CHLORIDE: 600; 310; 30; 20 INJECTION, SOLUTION INTRAVENOUS at 18:24

## 2023-07-27 RX ADMIN — PIPERACILLIN AND TAZOBACTAM 3375 MG: 3; .375 INJECTION, POWDER, LYOPHILIZED, FOR SOLUTION INTRAVENOUS at 22:45

## 2023-07-27 RX ADMIN — LORAZEPAM 0.5 MG: 2 INJECTION INTRAMUSCULAR; INTRAVENOUS at 12:28

## 2023-07-27 RX ADMIN — Medication 10 ML: at 20:58

## 2023-07-27 RX ADMIN — MORPHINE SULFATE 1 MG: 2 INJECTION, SOLUTION INTRAMUSCULAR; INTRAVENOUS at 18:01

## 2023-07-27 RX ADMIN — SODIUM CHLORIDE 1000 ML: 9 INJECTION, SOLUTION INTRAVENOUS at 11:08

## 2023-07-27 RX ADMIN — ENOXAPARIN SODIUM 30 MG: 100 INJECTION SUBCUTANEOUS at 22:46

## 2023-07-27 RX ADMIN — HYDROMORPHONE HYDROCHLORIDE 0.5 MG: 1 INJECTION, SOLUTION INTRAMUSCULAR; INTRAVENOUS; SUBCUTANEOUS at 10:47

## 2023-07-27 RX ADMIN — SODIUM CHLORIDE 25 ML: 9 INJECTION, SOLUTION INTRAVENOUS at 22:44

## 2023-07-27 RX ADMIN — ONDANSETRON 4 MG: 2 INJECTION INTRAMUSCULAR; INTRAVENOUS at 10:47

## 2023-07-27 RX ADMIN — SODIUM CHLORIDE, SODIUM LACTATE, POTASSIUM CHLORIDE, AND CALCIUM CHLORIDE 500 ML: .6; .31; .03; .02 INJECTION, SOLUTION INTRAVENOUS at 18:47

## 2023-07-27 RX ADMIN — IOPAMIDOL 75 ML: 755 INJECTION, SOLUTION INTRAVENOUS at 12:03

## 2023-07-27 RX ADMIN — SODIUM CHLORIDE, POTASSIUM CHLORIDE, SODIUM LACTATE AND CALCIUM CHLORIDE: 600; 310; 30; 20 INJECTION, SOLUTION INTRAVENOUS at 23:54

## 2023-07-27 RX ADMIN — MIDAZOLAM 2.5 MG: 1 INJECTION INTRAMUSCULAR; INTRAVENOUS at 13:25

## 2023-07-27 ASSESSMENT — ENCOUNTER SYMPTOMS
SHORTNESS OF BREATH: 0
VOMITING: 0
COUGH: 0
DIARRHEA: 0
ABDOMINAL PAIN: 1
CONSTIPATION: 1
ABDOMINAL DISTENTION: 1
BLOOD IN STOOL: 0
NAUSEA: 0
CHEST TIGHTNESS: 0

## 2023-07-27 ASSESSMENT — PAIN SCALES - GENERAL
PAINLEVEL_OUTOF10: 0
PAINLEVEL_OUTOF10: 0
PAINLEVEL_OUTOF10: 9
PAINLEVEL_OUTOF10: 9

## 2023-07-27 ASSESSMENT — PAIN DESCRIPTION - LOCATION: LOCATION: ABDOMEN

## 2023-07-27 ASSESSMENT — PAIN - FUNCTIONAL ASSESSMENT: PAIN_FUNCTIONAL_ASSESSMENT: 0-10

## 2023-07-27 NOTE — PROGRESS NOTES
Pharmacy Home Medication Reconciliation Note    A medication reconciliation has been completed for Kathleen Morgan 1935    Pharmacy: [facility], [place], [address] [place] [address] provided by: patient's daughter    The patient's home medication list is as follows: No current facility-administered medications on file prior to encounter. Current Outpatient Medications on File Prior to Encounter   Medication Sig Dispense Refill    Multiple Vitamins-Minerals (PRESERVISION AREDS 2+MULTI VIT) CAPS Take 1 capsule by mouth daily      memantine (NAMENDA) 5 MG tablet Take 1 tablet by mouth 2 times daily 180 tablet 0    acetaminophen (TYLENOL) 500 MG tablet Take 1 tablet by mouth every 8 (eight) hours for 10 days 30 tablet 0    folic acid (FOLVITE) 406 MCG tablet Take 1 tablet by mouth every other day      Coenzyme Q10 (COQ10 PO) Take by mouth      Omega-3 Fatty Acids (FISH OIL EXTRA STRENGTH) 1200 MG CAPS Take by mouth      Cholecalciferol (VITAMIN D3) 50 MCG (2000 UT) CAPS Take 1 capsule by mouth daily      magnesium oxide (MAG-OX) 400 MG tablet Take 1 tablet by mouth daily as needed Leg cramps         Timing of last doses updated. Thank you,  Ana Wetzel

## 2023-07-27 NOTE — ED NOTES
Pt c/o abdominal pain \"everywhere\" started this am.  Had a BM this am that were \"hard like stool\". Denies n/v.    Patient alert and oriented x4. GCS 15/15. Skin appropriate for ethnicity, dry and intact. No signs of acute distress noted at this time. Pt placed on a continuous pulse oximetry and telemetry monitoring. Bedside Monitor on with Alarms audible and alarms set. Pt on cycling blood pressure. Fall risk precautions in place, call light in reach, bed side table within reach, bed alarm on, will continue to monitor. Daughter at pts bedside.       Dhara Harrison RN  07/27/23 1300

## 2023-07-27 NOTE — H&P
HOSPITALISTS HISTORY AND PHYSICAL    7/27/2023 2:57 PM    Patient Information:  Laura Morrow is a 81 y.o. female [id number]  PCP:  Yulissa Mensah MD (Tel: [phone] )    Chief complaint:    Chief Complaint   Patient presents with    Abdominal Pain     All over abd pain since this morning, reports hard \"ball like\" bowel movements. Denies any nausea or vomiting       History of Present Illness:  Laura Morrow is a 81 y.o. female who lethargic when I see her and is unable to give me much history patient does have NG tube placed and was given Versed and Dilaudid. Daughter is in the room. Daughter states patient was fine yesterday complains of nausea vomiting or pain this morning patient called her daughter at 5 AM complaining of severe abdominal pain denying nausea or vomiting did have a small hard bowel movement in the morning. Patient has a history of lap matthew in May of this year has a history of dementia is alert and oriented times through not to time at baseline. Patient does not smoke or drink any fevers or chills to the daughter    REVIEW OF SYSTEMS:   Sedatd unable to answer    Past Medical History:   has a past medical history of Anemia, Angina at rest [facility]), Anxiety, Back pain, CAD (coronary artery disease), Clostridium difficile diarrhea, Heart attack ([address]), History of uterine suspension procedure, Hyperlipidemia, Hypertension, Pain, and Urinary incontinence. Past Surgical History:   has a past surgical history that includes Uterine Suspension; Colonoscopy; Appendectomy; Tonsillectomy; Hysterectomy, vaginal (11/17/2016); Rectocele repair; Cystocele repair; Cystocopy; other surgical history (04/06/2017); Bladder surgery; Wrist fracture surgery (Left); and Cholecystectomy, laparoscopic (N/A, 5/1/2023). Medications:  No current facility-administered medications on file prior to encounter.      Current

## 2023-07-27 NOTE — ED NOTES
Pt anxious, yelling. Thrashing in bed c/o abdominal pain, moving all over the bed. Daughter at bedside. ELVIS Metzger made aware. New orders received.      Kevyn Shahid RN  07/27/23 2125

## 2023-07-27 NOTE — ED NOTES
Left message for patients nurse on 3T to review patients chart for transfer of care.          Aparna Johnson RN  07/27/23 2449

## 2023-07-27 NOTE — ED NOTES
Dr Braeden Chowdhury and Soy LEAL at pts bedside. Dr Parvin Escobedo inserted NG tube to left Nares 65cm.        Grace Dowling RN  07/27/23 9384

## 2023-07-27 NOTE — ED NOTES
Depends changed. Mile care given. Linens changed. Will continue to monitor. Fall risk precautions in place, call light in reach, bed in lowest position, 2/2 bed rails up, bed wheels locked, bed side table within reach, bed alarm on, will continue to monitor.              Brandon Davila RN  07/27/23 3601

## 2023-07-27 NOTE — ED PROVIDER NOTES
[facility]        Pt Name: Angel Womack  MRN: [id number]  [address] [facility] 1935  Date of evaluation: 7/27/2023  Provider: Adriana Bolton PA-C  PCP: Jeanna Rice MD  Note Started: 10:25 AM EDT 7/27/23       I have seen and evaluated this patient with my supervising physician Jose Angel Coley MD.      [address]       Chief Complaint   Patient presents with    Abdominal Pain     All over abd pain since this morning, reports hard \"ball like\" bowel movements. Denies any nausea or vomiting       HISTORY OF PRESENT ILLNESS: 1 or more Elements     History from : Patient    Limitations to history : None    Angel Womack is a 81 y.o. female with a history of hypertension, hyperlipidemia, CAD, MI, anemia and dementia who presents to the emergency department today complaining of abdominal pain and distention that began this morning. Patient states she has had very firm stools that are little balls. She describes her abdominal pain as a throbbing, constant, 10/10 pain that seems to generalize all around her abdomen. She denies nausea, vomiting or diarrhea. She denies melena or hematochezia. She has no urinary complaints. She denies fever or chills. Nursing Notes were all reviewed and agreed with or any disagreements were addressed in the HPI. REVIEW OF SYSTEMS :      Review of Systems   Constitutional:  Negative for chills and fever. Respiratory:  Negative for cough, chest tightness and shortness of breath. Cardiovascular:  Negative for chest pain, palpitations and leg swelling. Gastrointestinal:  Positive for abdominal distention, abdominal pain and constipation. Negative for blood in stool, diarrhea, nausea and vomiting. Genitourinary:  Negative for difficulty urinating, dysuria, flank pain, frequency, hematuria and urgency. Neurological:  Negative for dizziness, light-headedness, numbness and headaches.    All other

## 2023-07-27 NOTE — CONSULTS
[facility]     Consult                                                     Patient Name: Julianna Damon  MRN: [id number]  YOB: 1935  Admission date: 7/27/2023 10:02 AM   Date of evaluation: 7/27/2023  Primary Care Physician: Apryl Reyes MD  Reason for consult: Abdominal pain  History of Present Illness:    Ms. Be Constantino is a 81 y.o. female who presents with abdominal pain, distension. She's been found to have a small bowel obstruction with unclear transition point and needs NG. With dementia required sedation and is unable to communicate. Information for this document has been supplemented with chart review and discussion with staff. Reportedly has had increased abdominal distension and constipation, generalized throbbing pain. No history of fevers, chills, chest pain, dyspnea, dysuria, jaundice, change in appetite weight otherwise or other complaints. Surgical history includes laparoscopic cholecystectomy with cholangiogram for acute cholecystitis with Dr. Aakash Martini 5/1/23 although there are no transition points that would have resulted from surgery or any other complications visualized on imaging that would indicate this is a post-operative issue. Additional surgery includes hysterectomy, bladder surgery, appendectomy. Medical conditions include CAD, dementia.  NG placed and surgery consulted to follow    Past Medical History:        Diagnosis Date    Anemia     Angina at rest [facility])     Anxiety     Back pain     CAD (coronary artery disease)     Clostridium difficile diarrhea 09/2016    Heart attack ([address])     History of uterine suspension procedure     Hyperlipidemia     Hypertension     Pain     back    Urinary incontinence        Past Surgical History:        Procedure Laterality Date    APPENDECTOMY      BLADDER SURGERY      CHOLECYSTECTOMY, LAPAROSCOPIC N/A 5/1/2023    CHOLECYSTECTOMY LAPAROSCOPIC, CHOLANGIOGRAM performed by Dung Jimenez MD at [facility]

## 2023-07-27 NOTE — ED PROVIDER NOTES
This patient was seen by the Mid-Level Provider. I have seen and examined the patient, agree with the workup, evaluation, management and diagnosis. Care plan has been discussed. My assessment reveals an 72-year-old female presents with abdominal pain. This is an 72-year-old female who presents with abdominal pain that started this morning. She also complains of some hard ball-like bowel movements. She denies any nausea or vomiting. She denies any fevers or chills. Radiology results:    CT ABDOMEN PELVIS W IV CONTRAST Additional Contrast? None   Final Result   1. Abnormal small bowel distension with high suspicion of mid small bowel   vascular compromise. 2. Small bowel containing left lower quadrant spigelian hernia. 3. Cecal containing direct right inguinal hernia.          XR ABDOMEN FOR NG/OG/NE TUBE PLACEMENT    (Results Pending)         LABS:    Labs Reviewed   CBC WITH AUTO DIFFERENTIAL - Abnormal; Notable for the following components:       Result Value    RBC 5.24 (*)     All other components within normal limits   COMPREHENSIVE METABOLIC PANEL - Abnormal; Notable for the following components:    Chloride 98 (*)     Glucose 106 (*)     BUN 23 (*)     ALT 9 (*)     AST 8 (*)     All other components within normal limits   URINALYSIS WITH REFLEX TO CULTURE - Abnormal; Notable for the following components:    Clarity, UA CLOUDY (*)     pH, UA 8.5 (*)     Leukocyte Esterase, Urine TRACE (*)     All other components within normal limits   MICROSCOPIC URINALYSIS - Abnormal; Notable for the following components:    Bacteria, UA 3+ (*)     All other components within normal limits   LACTATE, SEPSIS - Abnormal; Notable for the following components:    Lactic Acid, Sepsis 2.3 (*)     All other components within normal limits   LIPASE   LACTATE, SEPSIS           EKG:    Sinus rhythm at a rate of 81 beats a minute with no acute ST elevations or depressions or pathologic Q waves

## 2023-07-27 NOTE — ED NOTES
Pt continues to remove oxygen tubing, telemetry leads, pulse ox, and BP cuff. Pt being educated on importance of monitoring. Pt continues to be anxious and yelling at this RN. ELVIS [place] made aware. Will continue to monitor.       Mariana Zeng RN  07/27/23 4045

## 2023-07-27 NOTE — ED NOTES
ED TO INPATIENT SBAR HANDOFF    Patient Name: Neida Kong   :  1935  81 y.o. MRN:  [id number]  Preferred Name Gustavo Rodriguez  ED Room #:  ED-0029/29  Family/Caregiver Present yes   Restraints yes   Sitter no   Sepsis Risk Score Sepsis Risk Score: 0.62    Situation  Code Status: Prior No additional code details. Allergies: Atorvastatin, Captopril, Ciprofloxacin, Codeine, Detrol [tolterodine tartrate], Donepezil, Hydrocodone, Ibuprofen, Oxycodone, Statins, Sulfa antibiotics, Tolectin [tolmetin], Zestril [lisinopril], and Metoprolol  Weight: Patient Vitals for the past 96 hrs (Last 3 readings):   Weight   23 1424 104 lb (47.2 kg)     Arrived from: home  Chief Complaint:   Chief Complaint   Patient presents with    Abdominal Pain     All over abd pain since this morning, reports hard \"ball like\" bowel movements. Denies any nausea or vomiting     Hospital Problem/Diagnosis:  Active Problems:    Abdominal pain  Resolved Problems:    * No resolved hospital problems. *    Imaging:   XR ABDOMEN FOR NG/OG/NE TUBE PLACEMENT   Final Result   Enteric tube with tip and side-port in the proximal stomach         CT ABDOMEN PELVIS W IV CONTRAST Additional Contrast? None   Final Result   1. Abnormal small bowel distension with high suspicion of mid small bowel   vascular compromise. 2. Small bowel containing left lower quadrant spigelian hernia. 3. Cecal containing direct right inguinal hernia.            Abnormal labs:   Abnormal Labs Reviewed   CBC WITH AUTO DIFFERENTIAL - Abnormal; Notable for the following components:       Result Value    RBC 5.24 (*)     All other components within normal limits   COMPREHENSIVE METABOLIC PANEL - Abnormal; Notable for the following components:    Chloride 98 (*)     Glucose 106 (*)     BUN 23 (*)     ALT 9 (*)     AST 8 (*)     All other components within normal limits   URINALYSIS WITH REFLEX TO CULTURE - Abnormal; Notable for the following components:    Clarity, UA CLOUDY (*)

## 2023-07-28 ENCOUNTER — APPOINTMENT (OUTPATIENT)
Dept: GENERAL RADIOLOGY | Age: 88
DRG: 350 | End: 2023-07-28
Payer: MEDICARE

## 2023-07-28 ENCOUNTER — ANESTHESIA EVENT (OUTPATIENT)
Dept: OPERATING ROOM | Age: 88
End: 2023-07-28
Payer: MEDICARE

## 2023-07-28 ENCOUNTER — ANESTHESIA (OUTPATIENT)
Dept: OPERATING ROOM | Age: 88
End: 2023-07-28
Payer: MEDICARE

## 2023-07-28 PROBLEM — K43.0 VENTRAL INCISIONAL HERNIA WITH OBSTRUCTION: Status: ACTIVE | Noted: 2023-07-28

## 2023-07-28 PROBLEM — K40.30 INCARCERATED RIGHT INGUINAL HERNIA: Status: ACTIVE | Noted: 2023-07-28

## 2023-07-28 PROBLEM — K56.609 SMALL BOWEL OBSTRUCTION (HCC): Status: ACTIVE | Noted: 2023-07-28

## 2023-07-28 LAB
ALBUMIN SERPL-MCNC: 3.3 G/DL (ref 3.4–5)
ALBUMIN/GLOB SERPL: 1.5 {RATIO} (ref 1.1–2.2)
ALP SERPL-CCNC: 63 U/L (ref 40–129)
ALT SERPL-CCNC: 10 U/L (ref 10–40)
ANION GAP SERPL CALCULATED.3IONS-SCNC: 8 MMOL/L (ref 3–16)
APTT BLD: 29 SEC (ref 22.7–35.9)
AST SERPL-CCNC: 11 U/L (ref 15–37)
BASOPHILS # BLD: 0 K/UL (ref 0–0.2)
BASOPHILS NFR BLD: 0.2 %
BILIRUB SERPL-MCNC: 0.8 MG/DL (ref 0–1)
BUN SERPL-MCNC: 27 MG/DL (ref 7–20)
CALCIUM SERPL-MCNC: 9 MG/DL (ref 8.3–10.6)
CHLORIDE SERPL-SCNC: 106 MMOL/L (ref 99–110)
CO2 SERPL-SCNC: 28 MMOL/L (ref 21–32)
CREAT SERPL-MCNC: 0.6 MG/DL (ref 0.6–1.2)
DEPRECATED RDW RBC AUTO: 14.5 % (ref 12.4–15.4)
EOSINOPHIL # BLD: 0 K/UL (ref 0–0.6)
EOSINOPHIL NFR BLD: 0 %
GFR SERPLBLD CREATININE-BSD FMLA CKD-EPI: >60 ML/MIN/{1.73_M2}
GLUCOSE SERPL-MCNC: 110 MG/DL (ref 70–99)
HCT VFR BLD AUTO: 42.8 % (ref 36–48)
HGB BLD-MCNC: 13.9 G/DL (ref 12–16)
INR PPP: 1.1 (ref 0.84–1.16)
LACTATE BLDV-SCNC: 3.4 MMOL/L (ref 0.4–2)
LYMPHOCYTES # BLD: 1.2 K/UL (ref 1–5.1)
LYMPHOCYTES NFR BLD: 10 %
MAGNESIUM SERPL-MCNC: 2 MG/DL (ref 1.8–2.4)
MCH RBC QN AUTO: 29.7 PG (ref 26–34)
MCHC RBC AUTO-ENTMCNC: 32.5 G/DL (ref 31–36)
MCV RBC AUTO: 91.3 FL (ref 80–100)
MONOCYTES # BLD: 0.8 K/UL (ref 0–1.3)
MONOCYTES NFR BLD: 7 %
NEUTROPHILS # BLD: 9.6 K/UL (ref 1.7–7.7)
NEUTROPHILS NFR BLD: 82.8 %
PHOSPHATE SERPL-MCNC: 3.8 MG/DL (ref 2.5–4.9)
PLATELET # BLD AUTO: 161 K/UL (ref 135–450)
PMV BLD AUTO: 7.9 FL (ref 5–10.5)
POTASSIUM SERPL-SCNC: 4 MMOL/L (ref 3.5–5.1)
PREALB SERPL-MCNC: 14.8 MG/DL (ref 20–40)
PROT SERPL-MCNC: 5.5 G/DL (ref 6.4–8.2)
PROTHROMBIN TIME: 14.2 SEC (ref 11.5–14.8)
RBC # BLD AUTO: 4.69 M/UL (ref 4–5.2)
SODIUM SERPL-SCNC: 142 MMOL/L (ref 136–145)
TRANSFERRIN SERPL-MCNC: 190 MG/DL (ref 200–360)
WBC # BLD AUTO: 11.6 K/UL (ref 4–11)

## 2023-07-28 PROCEDURE — 6360000002 HC RX W HCPCS: Performed by: SURGERY

## 2023-07-28 PROCEDURE — 3600000014 HC SURGERY LEVEL 4 ADDTL 15MIN: Performed by: SURGERY

## 2023-07-28 PROCEDURE — 2720000010 HC SURG SUPPLY STERILE: Performed by: SURGERY

## 2023-07-28 PROCEDURE — 36415 COLL VENOUS BLD VENIPUNCTURE: CPT

## 2023-07-28 PROCEDURE — 2580000003 HC RX 258: Performed by: SURGERY

## 2023-07-28 PROCEDURE — 49594 RPR AA HRN 1ST 3-10 NCR/STRN: CPT | Performed by: SURGERY

## 2023-07-28 PROCEDURE — 7100000000 HC PACU RECOVERY - FIRST 15 MIN: Performed by: SURGERY

## 2023-07-28 PROCEDURE — 6360000002 HC RX W HCPCS: Performed by: NURSE ANESTHETIST, CERTIFIED REGISTERED

## 2023-07-28 PROCEDURE — 2500000003 HC RX 250 WO HCPCS: Performed by: NURSE ANESTHETIST, CERTIFIED REGISTERED

## 2023-07-28 PROCEDURE — 6360000002 HC RX W HCPCS: Performed by: ANESTHESIOLOGY

## 2023-07-28 PROCEDURE — C9399 UNCLASSIFIED DRUGS OR BIOLOG: HCPCS | Performed by: ANESTHESIOLOGY

## 2023-07-28 PROCEDURE — 85730 THROMBOPLASTIN TIME PARTIAL: CPT

## 2023-07-28 PROCEDURE — 84134 ASSAY OF PREALBUMIN: CPT

## 2023-07-28 PROCEDURE — P9045 ALBUMIN (HUMAN), 5%, 250 ML: HCPCS | Performed by: NURSE ANESTHETIST, CERTIFIED REGISTERED

## 2023-07-28 PROCEDURE — 80053 COMPREHEN METABOLIC PANEL: CPT

## 2023-07-28 PROCEDURE — 83605 ASSAY OF LACTIC ACID: CPT

## 2023-07-28 PROCEDURE — 3600000004 HC SURGERY LEVEL 4 BASE: Performed by: SURGERY

## 2023-07-28 PROCEDURE — 2709999900 HC NON-CHARGEABLE SUPPLY: Performed by: SURGERY

## 2023-07-28 PROCEDURE — 85610 PROTHROMBIN TIME: CPT

## 2023-07-28 PROCEDURE — 74019 RADEX ABDOMEN 2 VIEWS: CPT

## 2023-07-28 PROCEDURE — 85025 COMPLETE CBC W/AUTO DIFF WBC: CPT

## 2023-07-28 PROCEDURE — 0YU54JZ SUPPLEMENT RIGHT INGUINAL REGION WITH SYNTHETIC SUBSTITUTE, PERCUTANEOUS ENDOSCOPIC APPROACH: ICD-10-PCS | Performed by: SURGERY

## 2023-07-28 PROCEDURE — 6360000002 HC RX W HCPCS: Performed by: INTERNAL MEDICINE

## 2023-07-28 PROCEDURE — 6360000002 HC RX W HCPCS: Performed by: HOSPITALIST

## 2023-07-28 PROCEDURE — APPSS15 APP SPLIT SHARED TIME 0-15 MINUTES: Performed by: NURSE PRACTITIONER

## 2023-07-28 PROCEDURE — 99232 SBSQ HOSP IP/OBS MODERATE 35: CPT | Performed by: SURGERY

## 2023-07-28 PROCEDURE — 6360000002 HC RX W HCPCS: Performed by: STUDENT IN AN ORGANIZED HEALTH CARE EDUCATION/TRAINING PROGRAM

## 2023-07-28 PROCEDURE — 7100000001 HC PACU RECOVERY - ADDTL 15 MIN: Performed by: SURGERY

## 2023-07-28 PROCEDURE — C1781 MESH (IMPLANTABLE): HCPCS | Performed by: SURGERY

## 2023-07-28 PROCEDURE — 83735 ASSAY OF MAGNESIUM: CPT

## 2023-07-28 PROCEDURE — 84100 ASSAY OF PHOSPHORUS: CPT

## 2023-07-28 PROCEDURE — APPNB30 APP NON BILLABLE TIME 0-30 MINS: Performed by: NURSE PRACTITIONER

## 2023-07-28 PROCEDURE — 0WUF4JZ SUPPLEMENT ABDOMINAL WALL WITH SYNTHETIC SUBSTITUTE, PERCUTANEOUS ENDOSCOPIC APPROACH: ICD-10-PCS | Performed by: SURGERY

## 2023-07-28 PROCEDURE — 3700000001 HC ADD 15 MINUTES (ANESTHESIA): Performed by: SURGERY

## 2023-07-28 PROCEDURE — 2580000003 HC RX 258: Performed by: NURSE ANESTHETIST, CERTIFIED REGISTERED

## 2023-07-28 PROCEDURE — 84466 ASSAY OF TRANSFERRIN: CPT

## 2023-07-28 PROCEDURE — 2580000003 HC RX 258: Performed by: INTERNAL MEDICINE

## 2023-07-28 PROCEDURE — 1200000000 HC SEMI PRIVATE

## 2023-07-28 PROCEDURE — 0DN84ZZ RELEASE SMALL INTESTINE, PERCUTANEOUS ENDOSCOPIC APPROACH: ICD-10-PCS | Performed by: SURGERY

## 2023-07-28 PROCEDURE — 2500000003 HC RX 250 WO HCPCS: Performed by: SURGERY

## 2023-07-28 PROCEDURE — 3700000000 HC ANESTHESIA ATTENDED CARE: Performed by: SURGERY

## 2023-07-28 PROCEDURE — C9290 INJ, BUPIVACAINE LIPOSOME: HCPCS | Performed by: SURGERY

## 2023-07-28 PROCEDURE — 49650 LAP ING HERNIA REPAIR INIT: CPT | Performed by: SURGERY

## 2023-07-28 DEVICE — 3DMAX MESH, 10.8 CM X 16.0 CM (4.3" X 6.3"), RIGHT, LARGE
Type: IMPLANTABLE DEVICE | Status: FUNCTIONAL
Brand: 3DMAX

## 2023-07-28 DEVICE — VENTRIO ST HERNIA PATCH, 11.4 CM (4.5"), CIRCLE
Type: IMPLANTABLE DEVICE | Status: FUNCTIONAL
Brand: VENTRIO

## 2023-07-28 RX ORDER — KETOROLAC TROMETHAMINE 30 MG/ML
INJECTION, SOLUTION INTRAMUSCULAR; INTRAVENOUS
Status: DISPENSED
Start: 2023-07-28 | End: 2023-07-29

## 2023-07-28 RX ORDER — FENTANYL CITRATE 50 UG/ML
25 INJECTION, SOLUTION INTRAMUSCULAR; INTRAVENOUS EVERY 5 MIN PRN
Status: DISCONTINUED | OUTPATIENT
Start: 2023-07-28 | End: 2023-07-28 | Stop reason: HOSPADM

## 2023-07-28 RX ORDER — BUPIVACAINE HYDROCHLORIDE 5 MG/ML
INJECTION, SOLUTION EPIDURAL; INTRACAUDAL
Status: COMPLETED | OUTPATIENT
Start: 2023-07-28 | End: 2023-07-28

## 2023-07-28 RX ORDER — HYDROMORPHONE HYDROCHLORIDE 2 MG/ML
0.5 INJECTION, SOLUTION INTRAMUSCULAR; INTRAVENOUS; SUBCUTANEOUS EVERY 5 MIN PRN
Status: DISCONTINUED | OUTPATIENT
Start: 2023-07-28 | End: 2023-07-28 | Stop reason: HOSPADM

## 2023-07-28 RX ORDER — HYDROMORPHONE HYDROCHLORIDE 2 MG/ML
0.25 INJECTION, SOLUTION INTRAMUSCULAR; INTRAVENOUS; SUBCUTANEOUS EVERY 5 MIN PRN
Status: DISCONTINUED | OUTPATIENT
Start: 2023-07-28 | End: 2023-07-28 | Stop reason: HOSPADM

## 2023-07-28 RX ORDER — LORAZEPAM 0.5 MG/1
0.5 TABLET ORAL ONCE
Status: DISCONTINUED | OUTPATIENT
Start: 2023-07-28 | End: 2023-07-28

## 2023-07-28 RX ORDER — LIDOCAINE HYDROCHLORIDE 20 MG/ML
INJECTION, SOLUTION EPIDURAL; INFILTRATION; INTRACAUDAL; PERINEURAL PRN
Status: DISCONTINUED | OUTPATIENT
Start: 2023-07-28 | End: 2023-07-28 | Stop reason: SDUPTHER

## 2023-07-28 RX ORDER — EPHEDRINE SULFATE/0.9% NACL/PF 50 MG/5 ML
SYRINGE (ML) INTRAVENOUS PRN
Status: DISCONTINUED | OUTPATIENT
Start: 2023-07-28 | End: 2023-07-28 | Stop reason: SDUPTHER

## 2023-07-28 RX ORDER — ONDANSETRON 2 MG/ML
INJECTION INTRAMUSCULAR; INTRAVENOUS PRN
Status: DISCONTINUED | OUTPATIENT
Start: 2023-07-28 | End: 2023-07-28 | Stop reason: SDUPTHER

## 2023-07-28 RX ORDER — SODIUM CHLORIDE, SODIUM LACTATE, POTASSIUM CHLORIDE, AND CALCIUM CHLORIDE .6; .31; .03; .02 G/100ML; G/100ML; G/100ML; G/100ML
IRRIGANT IRRIGATION
Status: COMPLETED | OUTPATIENT
Start: 2023-07-28 | End: 2023-07-28

## 2023-07-28 RX ORDER — SODIUM CHLORIDE 9 MG/ML
INJECTION, SOLUTION INTRAVENOUS PRN
Status: DISCONTINUED | OUTPATIENT
Start: 2023-07-28 | End: 2023-07-28 | Stop reason: HOSPADM

## 2023-07-28 RX ORDER — FENTANYL CITRATE 50 UG/ML
INJECTION, SOLUTION INTRAMUSCULAR; INTRAVENOUS PRN
Status: DISCONTINUED | OUTPATIENT
Start: 2023-07-28 | End: 2023-07-28 | Stop reason: SDUPTHER

## 2023-07-28 RX ORDER — ONDANSETRON 2 MG/ML
4 INJECTION INTRAMUSCULAR; INTRAVENOUS
Status: DISCONTINUED | OUTPATIENT
Start: 2023-07-28 | End: 2023-07-28 | Stop reason: HOSPADM

## 2023-07-28 RX ORDER — METOCLOPRAMIDE HYDROCHLORIDE 5 MG/ML
10 INJECTION INTRAMUSCULAR; INTRAVENOUS EVERY 6 HOURS
Status: COMPLETED | OUTPATIENT
Start: 2023-07-28 | End: 2023-07-29

## 2023-07-28 RX ORDER — HALOPERIDOL 5 MG/ML
10 INJECTION INTRAMUSCULAR ONCE
Status: DISCONTINUED | OUTPATIENT
Start: 2023-07-28 | End: 2023-07-28 | Stop reason: HOSPADM

## 2023-07-28 RX ORDER — SODIUM CHLORIDE 0.9 % (FLUSH) 0.9 %
5-40 SYRINGE (ML) INJECTION EVERY 12 HOURS SCHEDULED
Status: DISCONTINUED | OUTPATIENT
Start: 2023-07-28 | End: 2023-07-28 | Stop reason: HOSPADM

## 2023-07-28 RX ORDER — KETOROLAC TROMETHAMINE 30 MG/ML
30 INJECTION, SOLUTION INTRAMUSCULAR; INTRAVENOUS ONCE
Status: COMPLETED | OUTPATIENT
Start: 2023-07-28 | End: 2023-07-28

## 2023-07-28 RX ORDER — DEXMEDETOMIDINE HYDROCHLORIDE 100 UG/ML
INJECTION, SOLUTION INTRAVENOUS PRN
Status: DISCONTINUED | OUTPATIENT
Start: 2023-07-28 | End: 2023-07-28

## 2023-07-28 RX ORDER — DEXMEDETOMIDINE HYDROCHLORIDE 100 UG/ML
INJECTION, SOLUTION INTRAVENOUS PRN
Status: DISCONTINUED | OUTPATIENT
Start: 2023-07-28 | End: 2023-07-28 | Stop reason: SDUPTHER

## 2023-07-28 RX ORDER — LORAZEPAM 2 MG/ML
0.5 INJECTION INTRAMUSCULAR ONCE
Status: COMPLETED | OUTPATIENT
Start: 2023-07-28 | End: 2023-07-28

## 2023-07-28 RX ORDER — ACETAMINOPHEN 325 MG/1
650 TABLET ORAL EVERY 8 HOURS SCHEDULED
Status: DISCONTINUED | OUTPATIENT
Start: 2023-07-28 | End: 2023-07-28

## 2023-07-28 RX ORDER — GLYCOPYRROLATE 0.2 MG/ML
INJECTION INTRAMUSCULAR; INTRAVENOUS PRN
Status: DISCONTINUED | OUTPATIENT
Start: 2023-07-28 | End: 2023-07-28 | Stop reason: SDUPTHER

## 2023-07-28 RX ORDER — MEPERIDINE HYDROCHLORIDE 25 MG/ML
12.5 INJECTION INTRAMUSCULAR; INTRAVENOUS; SUBCUTANEOUS EVERY 5 MIN PRN
Status: DISCONTINUED | OUTPATIENT
Start: 2023-07-28 | End: 2023-07-28 | Stop reason: HOSPADM

## 2023-07-28 RX ORDER — KETOROLAC TROMETHAMINE 15 MG/ML
15 INJECTION, SOLUTION INTRAMUSCULAR; INTRAVENOUS EVERY 6 HOURS PRN
Status: DISCONTINUED | OUTPATIENT
Start: 2023-07-28 | End: 2023-08-02 | Stop reason: HOSPADM

## 2023-07-28 RX ORDER — PROPOFOL 10 MG/ML
INJECTION, EMULSION INTRAVENOUS PRN
Status: DISCONTINUED | OUTPATIENT
Start: 2023-07-28 | End: 2023-07-28 | Stop reason: SDUPTHER

## 2023-07-28 RX ORDER — DIPHENHYDRAMINE HYDROCHLORIDE 50 MG/ML
25 INJECTION INTRAMUSCULAR; INTRAVENOUS EVERY 6 HOURS PRN
Status: DISCONTINUED | OUTPATIENT
Start: 2023-07-28 | End: 2023-08-02 | Stop reason: HOSPADM

## 2023-07-28 RX ORDER — ACETAMINOPHEN 500 MG
1000 TABLET ORAL EVERY 8 HOURS SCHEDULED
Status: CANCELLED | OUTPATIENT
Start: 2023-07-28 | End: 2023-07-31

## 2023-07-28 RX ORDER — DIPHENHYDRAMINE HYDROCHLORIDE 50 MG/ML
12.5 INJECTION INTRAMUSCULAR; INTRAVENOUS
Status: DISCONTINUED | OUTPATIENT
Start: 2023-07-28 | End: 2023-07-28 | Stop reason: HOSPADM

## 2023-07-28 RX ORDER — ROCURONIUM BROMIDE 10 MG/ML
INJECTION, SOLUTION INTRAVENOUS PRN
Status: DISCONTINUED | OUTPATIENT
Start: 2023-07-28 | End: 2023-07-28 | Stop reason: SDUPTHER

## 2023-07-28 RX ORDER — ALBUMIN, HUMAN INJ 5% 5 %
SOLUTION INTRAVENOUS PRN
Status: DISCONTINUED | OUTPATIENT
Start: 2023-07-28 | End: 2023-07-28 | Stop reason: SDUPTHER

## 2023-07-28 RX ORDER — MORPHINE SULFATE 2 MG/ML
2 INJECTION, SOLUTION INTRAMUSCULAR; INTRAVENOUS
Status: DISCONTINUED | OUTPATIENT
Start: 2023-07-28 | End: 2023-07-31

## 2023-07-28 RX ORDER — SODIUM CHLORIDE 9 MG/ML
INJECTION, SOLUTION INTRAVENOUS CONTINUOUS PRN
Status: DISCONTINUED | OUTPATIENT
Start: 2023-07-28 | End: 2023-07-28 | Stop reason: SDUPTHER

## 2023-07-28 RX ORDER — SODIUM CHLORIDE 0.9 % (FLUSH) 0.9 %
5-40 SYRINGE (ML) INJECTION PRN
Status: DISCONTINUED | OUTPATIENT
Start: 2023-07-28 | End: 2023-07-28 | Stop reason: HOSPADM

## 2023-07-28 RX ORDER — SUCCINYLCHOLINE/SOD CL,ISO/PF 200MG/10ML
SYRINGE (ML) INTRAVENOUS PRN
Status: DISCONTINUED | OUTPATIENT
Start: 2023-07-28 | End: 2023-07-28 | Stop reason: SDUPTHER

## 2023-07-28 RX ORDER — DEXAMETHASONE SODIUM PHOSPHATE 4 MG/ML
INJECTION, SOLUTION INTRA-ARTICULAR; INTRALESIONAL; INTRAMUSCULAR; INTRAVENOUS; SOFT TISSUE PRN
Status: DISCONTINUED | OUTPATIENT
Start: 2023-07-28 | End: 2023-07-28 | Stop reason: SDUPTHER

## 2023-07-28 RX ADMIN — SODIUM CHLORIDE: 9 INJECTION, SOLUTION INTRAVENOUS at 16:12

## 2023-07-28 RX ADMIN — GLYCOPYRROLATE 0.2 MG: 0.2 INJECTION INTRAMUSCULAR; INTRAVENOUS at 14:22

## 2023-07-28 RX ADMIN — DIPHENHYDRAMINE HYDROCHLORIDE 25 MG: 50 INJECTION, SOLUTION INTRAMUSCULAR; INTRAVENOUS at 21:44

## 2023-07-28 RX ADMIN — HYDROMORPHONE HYDROCHLORIDE 0.25 MG: 2 INJECTION, SOLUTION INTRAMUSCULAR; INTRAVENOUS; SUBCUTANEOUS at 17:52

## 2023-07-28 RX ADMIN — DIPHENHYDRAMINE HYDROCHLORIDE 25 MG: 50 INJECTION, SOLUTION INTRAMUSCULAR; INTRAVENOUS at 05:25

## 2023-07-28 RX ADMIN — PIPERACILLIN AND TAZOBACTAM 3375 MG: 3; .375 INJECTION, POWDER, LYOPHILIZED, FOR SOLUTION INTRAVENOUS at 13:54

## 2023-07-28 RX ADMIN — PIPERACILLIN AND TAZOBACTAM 3375 MG: 3; .375 INJECTION, POWDER, LYOPHILIZED, FOR SOLUTION INTRAVENOUS at 06:12

## 2023-07-28 RX ADMIN — ALBUMIN (HUMAN) 12.5 G: 12.5 INJECTION, SOLUTION INTRAVENOUS at 14:32

## 2023-07-28 RX ADMIN — SODIUM CHLORIDE, PRESERVATIVE FREE 10 ML: 5 INJECTION INTRAVENOUS at 03:21

## 2023-07-28 RX ADMIN — LORAZEPAM 0.5 MG: 2 INJECTION INTRAMUSCULAR; INTRAVENOUS at 08:41

## 2023-07-28 RX ADMIN — ROCURONIUM BROMIDE 10 MG: 10 INJECTION, SOLUTION INTRAVENOUS at 16:07

## 2023-07-28 RX ADMIN — MORPHINE SULFATE 1 MG: 2 INJECTION, SOLUTION INTRAMUSCULAR; INTRAVENOUS at 07:27

## 2023-07-28 RX ADMIN — MORPHINE SULFATE 1 MG: 2 INJECTION, SOLUTION INTRAMUSCULAR; INTRAVENOUS at 03:20

## 2023-07-28 RX ADMIN — DEXMEDETOMIDINE HYDROCHLORIDE 6 MCG: 100 INJECTION, SOLUTION INTRAVENOUS at 15:42

## 2023-07-28 RX ADMIN — Medication 10 MG: at 14:29

## 2023-07-28 RX ADMIN — Medication 100 MG: at 14:07

## 2023-07-28 RX ADMIN — FENTANYL CITRATE 25 MCG: 50 INJECTION, SOLUTION INTRAMUSCULAR; INTRAVENOUS at 14:07

## 2023-07-28 RX ADMIN — METOCLOPRAMIDE 10 MG: 5 INJECTION, SOLUTION INTRAMUSCULAR; INTRAVENOUS at 21:44

## 2023-07-28 RX ADMIN — HYDROMORPHONE HYDROCHLORIDE 0.25 MG: 2 INJECTION, SOLUTION INTRAMUSCULAR; INTRAVENOUS; SUBCUTANEOUS at 17:28

## 2023-07-28 RX ADMIN — Medication 10 ML: at 08:42

## 2023-07-28 RX ADMIN — ROCURONIUM BROMIDE 40 MG: 10 INJECTION, SOLUTION INTRAVENOUS at 14:16

## 2023-07-28 RX ADMIN — FENTANYL CITRATE 25 MCG: 50 INJECTION, SOLUTION INTRAMUSCULAR; INTRAVENOUS at 14:55

## 2023-07-28 RX ADMIN — PHENYLEPHRINE HYDROCHLORIDE 100 MCG: 10 INJECTION INTRAVENOUS at 14:20

## 2023-07-28 RX ADMIN — FENTANYL CITRATE 25 MCG: 50 INJECTION, SOLUTION INTRAMUSCULAR; INTRAVENOUS at 14:42

## 2023-07-28 RX ADMIN — ONDANSETRON 4 MG: 2 INJECTION INTRAMUSCULAR; INTRAVENOUS at 14:23

## 2023-07-28 RX ADMIN — SODIUM CHLORIDE, POTASSIUM CHLORIDE, SODIUM LACTATE AND CALCIUM CHLORIDE: 600; 310; 30; 20 INJECTION, SOLUTION INTRAVENOUS at 06:08

## 2023-07-28 RX ADMIN — FENTANYL CITRATE 25 MCG: 50 INJECTION, SOLUTION INTRAMUSCULAR; INTRAVENOUS at 15:23

## 2023-07-28 RX ADMIN — ALBUMIN (HUMAN) 12.5 G: 12.5 INJECTION, SOLUTION INTRAVENOUS at 15:05

## 2023-07-28 RX ADMIN — FENTANYL CITRATE 25 MCG: 0.05 INJECTION, SOLUTION INTRAMUSCULAR; INTRAVENOUS at 18:41

## 2023-07-28 RX ADMIN — FENTANYL CITRATE 25 MCG: 50 INJECTION, SOLUTION INTRAMUSCULAR; INTRAVENOUS at 15:41

## 2023-07-28 RX ADMIN — PROPOFOL 50 MG: 10 INJECTION, EMULSION INTRAVENOUS at 14:07

## 2023-07-28 RX ADMIN — SODIUM CHLORIDE: 9 INJECTION, SOLUTION INTRAVENOUS at 13:57

## 2023-07-28 RX ADMIN — FENTANYL CITRATE 25 MCG: 50 INJECTION, SOLUTION INTRAMUSCULAR; INTRAVENOUS at 14:35

## 2023-07-28 RX ADMIN — Medication 10 ML: at 21:45

## 2023-07-28 RX ADMIN — SODIUM CHLORIDE, PRESERVATIVE FREE 10 ML: 5 INJECTION INTRAVENOUS at 05:25

## 2023-07-28 RX ADMIN — FENTANYL CITRATE 25 MCG: 50 INJECTION, SOLUTION INTRAMUSCULAR; INTRAVENOUS at 15:35

## 2023-07-28 RX ADMIN — SUGAMMADEX 200 MG: 100 INJECTION, SOLUTION INTRAVENOUS at 16:47

## 2023-07-28 RX ADMIN — ALBUMIN (HUMAN) 12.5 G: 12.5 INJECTION, SOLUTION INTRAVENOUS at 15:20

## 2023-07-28 RX ADMIN — SODIUM CHLORIDE: 9 INJECTION, SOLUTION INTRAVENOUS at 15:10

## 2023-07-28 RX ADMIN — FENTANYL CITRATE 25 MCG: 50 INJECTION, SOLUTION INTRAMUSCULAR; INTRAVENOUS at 14:18

## 2023-07-28 RX ADMIN — ROCURONIUM BROMIDE 10 MG: 10 INJECTION, SOLUTION INTRAVENOUS at 14:07

## 2023-07-28 RX ADMIN — DEXAMETHASONE SODIUM PHOSPHATE 4 MG: 4 INJECTION, SOLUTION INTRAMUSCULAR; INTRAVENOUS at 14:23

## 2023-07-28 RX ADMIN — ALBUMIN (HUMAN) 12.5 G: 12.5 INJECTION, SOLUTION INTRAVENOUS at 14:47

## 2023-07-28 RX ADMIN — PIPERACILLIN AND TAZOBACTAM 3375 MG: 3; .375 INJECTION, POWDER, LYOPHILIZED, FOR SOLUTION INTRAVENOUS at 22:56

## 2023-07-28 RX ADMIN — LIDOCAINE HYDROCHLORIDE 80 MG: 20 INJECTION, SOLUTION EPIDURAL; INFILTRATION; INTRACAUDAL; PERINEURAL at 14:07

## 2023-07-28 RX ADMIN — PHENYLEPHRINE HYDROCHLORIDE 100 MCG: 10 INJECTION INTRAVENOUS at 14:29

## 2023-07-28 RX ADMIN — KETOROLAC TROMETHAMINE 30 MG: 30 INJECTION, SOLUTION INTRAMUSCULAR; INTRAVENOUS at 18:37

## 2023-07-28 RX ADMIN — MORPHINE SULFATE 2 MG: 2 INJECTION, SOLUTION INTRAMUSCULAR; INTRAVENOUS at 21:43

## 2023-07-28 ASSESSMENT — ENCOUNTER SYMPTOMS: SHORTNESS OF BREATH: 0

## 2023-07-28 ASSESSMENT — PAIN SCALES - PAIN ASSESSMENT IN ADVANCED DEMENTIA (PAINAD)
CONSOLABILITY: 0
NEGVOCALIZATION: 0
BREATHING: 0
FACIALEXPRESSION: 0
TOTALSCORE: 0
BODYLANGUAGE: 0

## 2023-07-28 ASSESSMENT — PAIN SCALES - GENERAL
PAINLEVEL_OUTOF10: 0
PAINLEVEL_OUTOF10: 7
PAINLEVEL_OUTOF10: 5
PAINLEVEL_OUTOF10: 0
PAINLEVEL_OUTOF10: 7
PAINLEVEL_OUTOF10: 8

## 2023-07-28 ASSESSMENT — PAIN - FUNCTIONAL ASSESSMENT
PAIN_FUNCTIONAL_ASSESSMENT: 0-10
PAIN_FUNCTIONAL_ASSESSMENT: PREVENTS OR INTERFERES SOME ACTIVE ACTIVITIES AND ADLS

## 2023-07-28 ASSESSMENT — PAIN DESCRIPTION - ONSET: ONSET: ON-GOING

## 2023-07-28 ASSESSMENT — PAIN DESCRIPTION - LOCATION
LOCATION: ABDOMEN
LOCATION: BACK;RIB CAGE

## 2023-07-28 ASSESSMENT — PAIN DESCRIPTION - FREQUENCY: FREQUENCY: CONTINUOUS

## 2023-07-28 ASSESSMENT — PAIN DESCRIPTION - PAIN TYPE: TYPE: ACUTE PAIN

## 2023-07-28 ASSESSMENT — PAIN DESCRIPTION - DESCRIPTORS: DESCRIPTORS: DISCOMFORT

## 2023-07-28 ASSESSMENT — PAIN DESCRIPTION - ORIENTATION: ORIENTATION: LEFT

## 2023-07-28 NOTE — FLOWSHEET NOTE
Continuous pulse ox ordered; placed continuous pulse ox in pt's room & called RT at 2134. RT will monitor overnight.

## 2023-07-28 NOTE — BRIEF OP NOTE
Brief Postoperative Note      Patient: Laurel Carrizales  YOB: 1935  MRN: [id number]    Date of Procedure: 7/28/2023    Pre-Op Diagnosis Codes:     * Small bowel obstruction ([address]) [C11.676]    Post-Op Diagnosis: Same       Procedure(s):  DIAGNOSTIC LAPAROSCOPY, LYSIS OF ADHESIONS, REPAIR OF ABDOMINAL WALL HERNIA WITH  MESH    Surgeon(s):  Pastor Yost MD    Assistant:  First Assistant: Allyson Jin    Anesthesia: General    Estimated Blood Loss (mL): Minimal    Complications: None    Specimens:   * No specimens in log *    Implants:  Implant Name Type Inv. Item Serial No.  Lot No. LRB No. Used Action   MESH ELI L W10.3QG16QG R INGUINAL WHT POLYPR MFIL - [id number]  MESH ELI L W10.3BG49DO R INGUINAL WHT POLYPR MFIL  [employer] [id number] N/A 1 Implanted   PATCH ELI L DIA4. 5IN UNCOATED MFIL PROPYLENE CIR ABSRB - [id number]  PATCH ELI L DIA4. 5IN UNCOATED MFIL PROPYLENE CIR ABSRB  [employer]WD [id number] N/A 1 Implanted         Drains:   NG/OG/NJ/NE Tube Nasogastric Left nostril (Active)   Surrounding Skin Clean, dry & intact 07/28/23 0930   Securement device Tape 07/28/23 0930   Status Suction-low intermittent 07/28/23 0930   NG/OG/NJ/NE External Measurement (cm) 65 cm 07/28/23 0751   Drainage Appearance Brown;Green 07/28/23 0930   Output (mL) 400 ml 07/27/23 2257       Urinary Catheter 07/28/23 2 Way (Active)       Findings: SBO with mild acute hemorrhagic ischemia, all bowel reduced from hernias and viable, intraabd adhesions lysed, Left abd wall incarcerated 4 cm hernia and incarcerated indirect RIH repair completed.     Leave NG and seth tranight, probably remove in am.      Electronically signed by Pastor Yost MD on 7/28/2023 at 4:53 PM

## 2023-07-28 NOTE — FLOWSHEET NOTE
IV Zosyn not given today. LR infusing. Zosyn and LR not compatible per IV trissel's compatibility chart per [employer]. Called pharmacy and verified per pharmacist at 2006; pt will need new PIV to run LR separately from Zosyn. IV RN called at 2041 and reported that she will not be here to [facility] until approx. 2345 to see another pt here on [facility]. RN attempted PIV in right arm with #20. Pt tolerated well; new PIV site unsuccessful. RN attempted PIV in left arm with #22. Pt tolerated well; new PIV site unsuccessful. Called charge RN to attempt new IV placement. IV LR infusing at 150ml/hr; bolus has completed.  Will give zosyn after new IV site placed

## 2023-07-28 NOTE — PROGRESS NOTES
Pt Chuy Darnell, expresses that pt is okay to received blood product if needed during surgery. Surgical consent obtained, including blood product consent in the case of an emergency.

## 2023-07-28 NOTE — PLAN OF CARE
Problem: Pain  Goal: Verbalizes/displays adequate comfort level or baseline comfort level  Outcome: Progressing      07/28/23 0320   Pain Assessment   Pain Assessment 0-10   Pain Level 8   Pain Location Back;Rib cage   Pain Orientation Left   Pain Type Acute pain   Pain Frequency Continuous   Pain Onset On-going   Non-Pharmaceutical Pain Intervention(s) Emotional support  (and gave morphine IV;see MAR)

## 2023-07-28 NOTE — PROGRESS NOTES
Physical/Occupational Therapy  Ashley Amado  Orders received, chart reviewed. Pt currently leaving the floor for surgery. Will follow-up as schedule allows and as pt is medically appropriate.   Thank you,  Pili Sarah PT, DPT [id number]  Suly Price., OTR/L, [id number]

## 2023-07-28 NOTE — PROGRESS NOTES
Pt arrived to PACU from OR, VSS, pt arouses to voice. Laparoscopic incision sites x5 are CDI, ice applied. Jesus catheter notes yellow, clear urine. OG measures @ 64, positioned in the left nare. Will continue to monitor.

## 2023-07-28 NOTE — FLOWSHEET NOTE
Hospitalist Yoanna Salazar DO added Benadryl order for Pt c/o itching all over and especially focused on NGtube and IV sites. Will give dose after verified per pharmacy. See [facility] & all flowsheets.

## 2023-07-28 NOTE — ANESTHESIA POSTPROCEDURE EVALUATION
Department of Anesthesiology  Postprocedure Note    Patient: Kathleen Morgan  MRN: [id number]  YOB: 1935  Date of evaluation: 7/28/2023      Procedure Summary     Date: 07/28/23 Room / Location: [address]    Anesthesia Start: 1756 Anesthesia Stop: 0198    Procedure: DIAGNOSTIC LAPAROSCOPY, LYSIS OF ADHESIONS, REPAIR OF ABDOMINAL WALL HERNIA WITH  MESH (Abdomen) Diagnosis:       Small bowel obstruction ([address])      (Small bowel obstruction ([address]) [K56.609])    Surgeons: Maurice Martinez MD Responsible Provider: Ivon Cordero MD    Anesthesia Type: general ASA Status: 3          Anesthesia Type: No value filed.     Rian Phase I: Rian Score: 10    Rian Phase II:        Anesthesia Post Evaluation    Patient location during evaluation: PACU  Patient participation: complete - patient participated  Level of consciousness: awake and alert  Pain score: 2  Airway patency: patent  Nausea & Vomiting: no vomiting  Complications: no  Cardiovascular status: blood pressure returned to baseline  Respiratory status: acceptable  Hydration status: euvolemic  Multimodal analgesia pain management approach  Pain management: adequate

## 2023-07-28 NOTE — FLOWSHEET NOTE
Pt continues to attempt to remove lines and tubes. Will place order for bilateral wrist restraints. Will continue to monitor.

## 2023-07-28 NOTE — FLOWSHEET NOTE
Gave Benadryl IV at 0525 per pt c/o itching.  Pt also refused purewick;purewick removed and brief further tightened per pt request.

## 2023-07-29 ENCOUNTER — APPOINTMENT (OUTPATIENT)
Dept: GENERAL RADIOLOGY | Age: 88
DRG: 350 | End: 2023-07-29
Payer: MEDICARE

## 2023-07-29 LAB
ANION GAP SERPL CALCULATED.3IONS-SCNC: 6 MMOL/L (ref 3–16)
ANISOCYTOSIS BLD QL SMEAR: ABNORMAL
BASOPHILS # BLD: 0 K/UL (ref 0–0.2)
BASOPHILS NFR BLD: 0.2 %
BUN SERPL-MCNC: 15 MG/DL (ref 7–20)
CALCIUM SERPL-MCNC: 8.1 MG/DL (ref 8.3–10.6)
CHLORIDE SERPL-SCNC: 107 MMOL/L (ref 99–110)
CO2 SERPL-SCNC: 27 MMOL/L (ref 21–32)
CREAT SERPL-MCNC: <0.5 MG/DL (ref 0.6–1.2)
DEPRECATED RDW RBC AUTO: 14.4 % (ref 12.4–15.4)
EOSINOPHIL # BLD: 0 K/UL (ref 0–0.6)
EOSINOPHIL NFR BLD: 0 %
GFR SERPLBLD CREATININE-BSD FMLA CKD-EPI: >60 ML/MIN/{1.73_M2}
GLUCOSE SERPL-MCNC: 128 MG/DL (ref 70–99)
HCT VFR BLD AUTO: 39.8 % (ref 36–48)
HGB BLD-MCNC: 12.7 G/DL (ref 12–16)
LYMPHOCYTES # BLD: 0.6 K/UL (ref 1–5.1)
LYMPHOCYTES NFR BLD: 4.5 %
MCH RBC QN AUTO: 29.6 PG (ref 26–34)
MCHC RBC AUTO-ENTMCNC: 31.9 G/DL (ref 31–36)
MCV RBC AUTO: 92.8 FL (ref 80–100)
MONOCYTES # BLD: 0.8 K/UL (ref 0–1.3)
MONOCYTES NFR BLD: 6.1 %
NEUTROPHILS # BLD: 12 K/UL (ref 1.7–7.7)
NEUTROPHILS NFR BLD: 89.2 %
OVALOCYTES BLD QL SMEAR: ABNORMAL
PLATELET # BLD AUTO: 128 K/UL (ref 135–450)
PLATELET BLD QL SMEAR: ADEQUATE
PMV BLD AUTO: 9.1 FL (ref 5–10.5)
POTASSIUM SERPL-SCNC: 4.1 MMOL/L (ref 3.5–5.1)
RBC # BLD AUTO: 4.29 M/UL (ref 4–5.2)
SLIDE REVIEW: ABNORMAL
SODIUM SERPL-SCNC: 140 MMOL/L (ref 136–145)
WBC # BLD AUTO: 13.4 K/UL (ref 4–11)

## 2023-07-29 PROCEDURE — 99024 POSTOP FOLLOW-UP VISIT: CPT | Performed by: SURGERY

## 2023-07-29 PROCEDURE — 97530 THERAPEUTIC ACTIVITIES: CPT

## 2023-07-29 PROCEDURE — 6360000002 HC RX W HCPCS: Performed by: PHYSICIAN ASSISTANT

## 2023-07-29 PROCEDURE — 85025 COMPLETE CBC W/AUTO DIFF WBC: CPT

## 2023-07-29 PROCEDURE — 1200000000 HC SEMI PRIVATE

## 2023-07-29 PROCEDURE — 2700000000 HC OXYGEN THERAPY PER DAY

## 2023-07-29 PROCEDURE — 80048 BASIC METABOLIC PNL TOTAL CA: CPT

## 2023-07-29 PROCEDURE — 71045 X-RAY EXAM CHEST 1 VIEW: CPT

## 2023-07-29 PROCEDURE — 97166 OT EVAL MOD COMPLEX 45 MIN: CPT

## 2023-07-29 PROCEDURE — 6360000002 HC RX W HCPCS: Performed by: SURGERY

## 2023-07-29 PROCEDURE — 94761 N-INVAS EAR/PLS OXIMETRY MLT: CPT

## 2023-07-29 PROCEDURE — 2580000003 HC RX 258: Performed by: SURGERY

## 2023-07-29 PROCEDURE — C9113 INJ PANTOPRAZOLE SODIUM, VIA: HCPCS | Performed by: PHYSICIAN ASSISTANT

## 2023-07-29 PROCEDURE — 97162 PT EVAL MOD COMPLEX 30 MIN: CPT

## 2023-07-29 PROCEDURE — 36415 COLL VENOUS BLD VENIPUNCTURE: CPT

## 2023-07-29 PROCEDURE — 6370000000 HC RX 637 (ALT 250 FOR IP): Performed by: SURGERY

## 2023-07-29 RX ORDER — PANTOPRAZOLE SODIUM 40 MG/10ML
40 INJECTION, POWDER, LYOPHILIZED, FOR SOLUTION INTRAVENOUS 2 TIMES DAILY
Status: DISCONTINUED | OUTPATIENT
Start: 2023-07-29 | End: 2023-08-01

## 2023-07-29 RX ADMIN — SODIUM CHLORIDE, POTASSIUM CHLORIDE, SODIUM LACTATE AND CALCIUM CHLORIDE: 600; 310; 30; 20 INJECTION, SOLUTION INTRAVENOUS at 20:46

## 2023-07-29 RX ADMIN — PIPERACILLIN AND TAZOBACTAM 3375 MG: 3; .375 INJECTION, POWDER, LYOPHILIZED, FOR SOLUTION INTRAVENOUS at 14:11

## 2023-07-29 RX ADMIN — PIPERACILLIN AND TAZOBACTAM 3375 MG: 3; .375 INJECTION, POWDER, LYOPHILIZED, FOR SOLUTION INTRAVENOUS at 22:32

## 2023-07-29 RX ADMIN — KETOROLAC TROMETHAMINE 15 MG: 15 INJECTION, SOLUTION INTRAMUSCULAR; INTRAVENOUS at 16:55

## 2023-07-29 RX ADMIN — METOCLOPRAMIDE 10 MG: 5 INJECTION, SOLUTION INTRAMUSCULAR; INTRAVENOUS at 08:34

## 2023-07-29 RX ADMIN — Medication 10 ML: at 20:48

## 2023-07-29 RX ADMIN — MEMANTINE 5 MG: 5 TABLET ORAL at 20:45

## 2023-07-29 RX ADMIN — ENOXAPARIN SODIUM 30 MG: 100 INJECTION SUBCUTANEOUS at 08:34

## 2023-07-29 RX ADMIN — PIPERACILLIN AND TAZOBACTAM 3375 MG: 3; .375 INJECTION, POWDER, LYOPHILIZED, FOR SOLUTION INTRAVENOUS at 06:09

## 2023-07-29 RX ADMIN — METOCLOPRAMIDE 10 MG: 5 INJECTION, SOLUTION INTRAMUSCULAR; INTRAVENOUS at 03:09

## 2023-07-29 RX ADMIN — PANTOPRAZOLE SODIUM 40 MG: 40 INJECTION, POWDER, FOR SOLUTION INTRAVENOUS at 20:45

## 2023-07-29 RX ADMIN — SODIUM CHLORIDE, POTASSIUM CHLORIDE, SODIUM LACTATE AND CALCIUM CHLORIDE: 600; 310; 30; 20 INJECTION, SOLUTION INTRAVENOUS at 03:09

## 2023-07-29 RX ADMIN — PANTOPRAZOLE SODIUM 40 MG: 40 INJECTION, POWDER, FOR SOLUTION INTRAVENOUS at 10:55

## 2023-07-29 ASSESSMENT — PAIN SCALES - PAIN ASSESSMENT IN ADVANCED DEMENTIA (PAINAD)
TOTALSCORE: 0
CONSOLABILITY: 0
CONSOLABILITY: 0
NEGVOCALIZATION: 0
TOTALSCORE: 0
BREATHING: 0
TOTALSCORE: 0
BODYLANGUAGE: 0
NEGVOCALIZATION: 0
BREATHING: 0
BODYLANGUAGE: 0
FACIALEXPRESSION: 0
BREATHING: 0
NEGVOCALIZATION: 0
NEGVOCALIZATION: 0
CONSOLABILITY: 0
BREATHING: 0
BODYLANGUAGE: 0
FACIALEXPRESSION: 0
BODYLANGUAGE: 0
CONSOLABILITY: 0
TOTALSCORE: 0
FACIALEXPRESSION: 0
FACIALEXPRESSION: 0

## 2023-07-29 ASSESSMENT — PAIN DESCRIPTION - DESCRIPTORS: DESCRIPTORS: ACHING

## 2023-07-29 ASSESSMENT — PAIN SCALES - GENERAL
PAINLEVEL_OUTOF10: 8
PAINLEVEL_OUTOF10: 6
PAINLEVEL_OUTOF10: 0
PAINLEVEL_OUTOF10: 0

## 2023-07-29 ASSESSMENT — PAIN DESCRIPTION - ORIENTATION: ORIENTATION: MID

## 2023-07-29 ASSESSMENT — PAIN DESCRIPTION - LOCATION: LOCATION: ABDOMEN

## 2023-07-29 NOTE — PROGRESS NOTES
8275: Shift assessment completed, morning medications given per MAR. VSS, alert. Call light within reach. The care plan and education has been reviewed and mutually agreed upon with the patient.

## 2023-07-29 NOTE — OP NOTE
[address] [facility]                     [address], [address]                                OPERATIVE REPORT    PATIENT NAME: Laurel Carrizales                       :        1935  MED REC NO:   [id number]                          ROOM:       [id number]  ACCOUNT NO:   [de-identified]                           ADMIT DATE: 2023  PROVIDER:     Justina Bonilla MD    DATE OF PROCEDURE:  2023    PREOPERATIVE DIAGNOSES:  1.  Small bowel obstruction. 2.  Incarcerated left-sided abdominal wall ventral hernia. 3.  Incarcerated right inguinal hernia (separate site from other  hernia). PROCEDURE:  Diagnostic laparoscopy with laparoscopic lysis of adhesions,  reduction of small bowel obstruction and repair of incarcerated 4 cm  left lateral abdominal wall hernia, and repair of incarcerated right  inguinal hernia. SURGEON:  Justina Bonilla MD    ANESTHESIA:  General plus local.    ESTIMATED BLOOD LOSS:  Minimal.    COMPLICATIONS:  None. SPECIMENS:  None. FINDINGS:  The patient is an 81-year-old female admitted to the hospital  with severe abdominal pain and bowel obstruction. A couple of months  ago, she had a laparoscopic cholecystectomy which she recovered from  uneventfully. She returns this time for this separately-defined illness  with small bowel obstruction. She had incarcerated left-sided abdominal  hernia and also bowel down and a right inguinal hernia. Upon laparoscopy today, the patient had dense intra-abdominal adhesions  of omentum and a couple of small bowel to the intra-abdominal wall which  were taken down safely laparoscopically. In the left side, there was  omentum and small bowel incarcerated up in the hernia, and I believe,  this was the site of obstruction. There was also ileum in the cecum  down in the right inguinal hernia, which was more easily reducible and  did not appear obstructed.   Once all the adhesions were taken fascial closure  was done and then the PMI catheter was passed back through the center of  the hernia site with the suture attached to the mesh cuffs, now pulled  out through the midportion of the hernia. This works nicely to center  the mesh for the repair of this hernia. This was then tacked in place  using the SecureStrap tacker with complete outer row and a second  complete inner row, holding this in the mesh and resecuring the primary  closure of this area. All port sites were hemostatic. The area of the  tack placement and the meshes were hemostatic. The small bowel was  checked again through the area of concern and all appeared fine. The  instruments and ports were then removed. Port sites appeared  hemostatic. Skin was closed at all sites with 4-0 Monocryl suture. Skin sealing glue was placed at all port sites. At the end of the case,  we had four 5-mm ports and the 12-mm port, which were all closed. The  patient was taken to recovery room in stable condition postop. Gala Murguia MD    D: 07/28/2023 17:22:33       T: 07/28/2023 18:00:51     [email or web address]  Job#: [id number]     Doc#: [id number]    CC:   Shivani Nettles MD

## 2023-07-29 NOTE — FLOWSHEET NOTE
Pt brought up from surgery. VSS. NG tube at 65 cm with green drainage. NG connected to LIWS. Jesus cath draining cl, balaji urine. Pt confused with several attempts to pull at ng and simple mask. Restraints removed sitter at bedside.

## 2023-07-29 NOTE — PROGRESS NOTES
[address] [facility] Department   Phone: [phone]    Physical Therapy    [x] Initial Evaluation            [] Daily Treatment Note         [] Discharge Summary      Patient: Jamey Underwood   : 1935   MRN: [id number]   Date of Service:  2023  Admitting Diagnosis: SBO (small bowel obstruction) [facility])  Current Admission Summary: Ms. Geraldine Garduno is a 81 y.o. female who presents with abdominal pain, distension. She's been found to have a small bowel obstruction with unclear transition point and needs NG. With dementia required sedation and is unable to communicate. Information for this document has been supplemented with chart review and discussion with staff. Reportedly has had increased abdominal distension and constipation, generalized throbbing pain. No history of fevers, chills, chest pain, dyspnea, dysuria, jaundice, change in appetite weight otherwise or other complaints. Surgical history includes laparoscopic cholecystectomy with cholangiogram for acute cholecystitis with Dr. Bebe Moran 23 although there are no transition points that would have resulted from surgery or any other complications visualized on imaging that would indicate this is a post-operative issue. Additional surgery includes hysterectomy, bladder surgery, appendectomy. Medical conditions include CAD, dementia. NG placed and surgery consulted to follow    23: DIAGNOSTIC LAPAROSCOPY, LYSIS OF ADHESIONS, REPAIR OF ABDOMINAL WALL HERNIA WITH  MESH     Past Medical History:  has a past medical history of Anemia, Angina at rest [facility]), Anxiety, Back pain, CAD (coronary artery disease), Clostridium difficile diarrhea, Heart attack ([address]), History of uterine suspension procedure, Hyperlipidemia, Hypertension, Pain, and Urinary incontinence. Past Surgical History:  has a past surgical history that includes Uterine Suspension; Colonoscopy; Appendectomy; Tonsillectomy; Hysterectomy, vaginal (2016);  Rectocele

## 2023-07-29 NOTE — PLAN OF CARE
Problem: Discharge Planning  Goal: Discharge to home or other facility with appropriate resources  7/29/2023 0935 by Andee Baumgarten, RN  Outcome: Progressing  7/29/2023 0320 by Lilian Barraza RN  Outcome: Progressing  Flowsheets (Taken 7/29/2023 0320)  Discharge to home or other facility with appropriate resources: Identify discharge learning needs (meds, wound care, etc)     Problem: Pain  Goal: Verbalizes/displays adequate comfort level or baseline comfort level  7/29/2023 0935 by Andee Baumgarten, RN  Outcome: Progressing  7/29/2023 0320 by Lilian Barraza RN  Outcome: Progressing  Flowsheets (Taken 7/29/2023 0320)  Verbalizes/displays adequate comfort level or baseline comfort level:   Assess pain using appropriate pain scale   Encourage patient to monitor pain and request assistance     Problem: Hematologic - Adult  Goal: Maintains hematologic stability  Outcome: Progressing     Problem: Safety - Adult  Goal: Free from fall injury  Outcome: Progressing     Problem: Skin/Tissue Integrity  Goal: Absence of new skin breakdown  Description: 1. Monitor for areas of redness and/or skin breakdown  2. Assess vascular access sites hourly  3. Every 4-6 hours minimum:  Change oxygen saturation probe site  4. Every 4-6 hours:  If on nasal continuous positive airway pressure, respiratory therapy assess nares and determine need for appliance change or resting period.   Outcome: Progressing     Problem: ABCDS Injury Assessment  Goal: Absence of physical injury  7/29/2023 0935 by Andee Baumgarten, RN  Outcome: Progressing  7/29/2023 0320 by Lilian Barraza RN  Outcome: Progressing  Flowsheets (Taken 7/29/2023 0320)  Absence of Physical Injury: Implement safety measures based on patient assessment

## 2023-07-29 NOTE — PROGRESS NOTES
Antonia Cano is a 81 y.o. female patient.     Current Facility-Administered Medications   Medication Dose Route Frequency Provider Last Rate Last Admin    pantoprazole (PROTONIX) injection 40 mg  40 mg IntraVENous BID Cheko Carlisle PA-C   40 mg at 07/29/23 1055    diphenhydrAMINE (BENADRYL) injection 25 mg  25 mg IntraVENous Q6H PRN Mary Álvarez MD   25 mg at 07/28/23 2144    morphine (PF) injection 2 mg  2 mg IntraVENous Q2H PRN Mary Álvarez MD   2 mg at 07/28/23 2143    ketorolac (TORADOL) injection 15 mg  15 mg IntraVENous Q6H PRN Mary Álvarez MD        phenol 1.4 % mouth spray 1 spray  1 spray Mouth/Throat Q2H PRN Mary Álvarez MD        benzocaine-menthol (CEPACOL SORE THROAT) lozenge   Buccal PRN Mary Álvarez MD        piperacillin-tazobactam (ZOSYN) 3,375 mg in sodium chloride 0.9 % 50 mL IVPB (mini-bag)  3,375 mg IntraVENous Q8H Mary Álvarez MD 12.5 mL/hr at 07/29/23 1411 3,375 mg at 07/29/23 1411    memantine (NAMENDA) tablet 5 mg  5 mg Oral BID Mary Álvarez MD        lactated ringers IV soln infusion   IntraVENous Continuous Pastor Rae MD 75 mL/hr at 07/29/23 1300 Rate Verify at 07/29/23 1300    morphine (PF) injection 1 mg  1 mg IntraVENous Q4H PRN Mary Álvarez MD   1 mg at 07/28/23 2288    sodium chloride flush 0.9 % injection 5-40 mL  5-40 mL IntraVENous 2 times per day Mary Álvarez MD   10 mL at 07/28/23 2145    sodium chloride flush 0.9 % injection 5-40 mL  5-40 mL IntraVENous PRN Mary Álvarez MD   10 mL at 07/28/23 0525    0.9 % sodium chloride infusion   IntraVENous PRN Mary Álvarez  mL/hr at 07/27/23 2244 25 mL at 07/27/23 2244    enoxaparin Sodium (LOVENOX) injection 30 mg  30 mg SubCUTAneous Daily Mary Álvarez MD   30 mg at 07/29/23 0834    ondansetron (ZOFRAN-ODT) disintegrating tablet 4 mg  4 mg Oral Q8H PRN Mary Álvarez MD        Or kg), SpO2 100 %, not currently breastfeeding. Output: Producing urine and no stool output. Lungs:  Normal effort and normal respiratory rate. Abdomen: Abdomen is soft and non-distended. (Incisions approximated without evidence of infection). Neurological: Patient is alert and oriented to person, place and time. Skin:  Warm and dry. White blood count-13.4  H/H-12.7/39.8    Assessment & Plan 64-year-old female who is postop day #1 status post diagnostic laparoscopy with laparoscopic lysis of adhesions,  reduction of small bowel obstruction and repair of incarcerated 4 cm  left lateral abdominal wall hernia, and repair of incarcerated right  inguinal hernia. Doing fairly well. Was having significant abdominal pain as she was moved from the bed to the chair. Abdomen appropriately tender. No bowel function yet. Continue NG tube to suction. Okay for ice chips popsicles. Decrease IV fluids. Leave Jesus another 24 hours as she is not moving well.     Jhoana Gan MD  7/29/2023

## 2023-07-29 NOTE — PROGRESS NOTES
[address] [facility] Department   Phone: [phone]    Occupational Therapy    [x] Initial Evaluation            [] Daily Treatment Note         [] Discharge Summary      Patient: Denny Allison   : 1935   MRN: [id number]   Date of Service:  2023    Admitting Diagnosis:  SBO (small bowel obstruction) ([address])  Current Admission Summary: Denny Allison is a 81 y.o. female with a history of hypertension, hyperlipidemia, CAD, MI, anemia and dementia who presents to the emergency department today complaining of abdominal pain and distention that began this morning. Patient states she has had very firm stools that are little balls. She describes her abdominal pain as a throbbing, constant, 10/10 pain that seems to generalize all around her abdomen. She denies nausea, vomiting or diarrhea. She denies melena or hematochezia. She has no urinary complaints. She denies fever or chills. Past Medical History:  has a past medical history of Anemia, Angina at rest [facility]), Anxiety, Back pain, CAD (coronary artery disease), Clostridium difficile diarrhea, Heart attack ([address] History of uterine suspension procedure, Hyperlipidemia, Hypertension, Pain, and Urinary incontinence. Past Surgical History:  has a past surgical history that includes Uterine Suspension; Colonoscopy; Appendectomy; Tonsillectomy; Hysterectomy, vaginal (2016); Rectocele repair; Cystocele repair; Cystocopy; other surgical history (2017); Bladder surgery; Wrist fracture surgery (Left); and Cholecystectomy, laparoscopic (N/A, 2023). Discharge Recommendations: Denny Allison scored a 13/24 on the AM-PAC ADL Inpatient form. Current research shows that an AM-PAC score of 17 or less is typically not associated with a discharge to the patient's home setting.  Based on the patient's AM-PAC score and their current ADL deficits, it is recommended that the patient have 3-5 sessions per week of Occupational Therapy at d/c to increase the patient's independence. Please see assessment section for further patient specific details. If patient discharges prior to next session this note will serve as a discharge summary. Please see below for the latest assessment towards goals. DME Required For Discharge: DME to be determined at next level of care    Precautions/Restrictions: high fall risk  Weight Bearing Restrictions: no restrictions  [] Right Upper Extremity  [] Left Upper Extremity [] Right Lower Extremity  [] Left Lower Extremity     Required Braces/Orthotics: no braces required   [] Right  [] Left  Positional Restrictions:no positional restrictions    Pre-Admission Information   Lives With: alone                     Type of Home: house  Home Layout: one level  Home Access:  3 steps+3 steps from front. Back ~2 steps with HR  Bathroom Layout: tub/shower unit  Bathroom Equipment:  shower chair, grab bars. Pt prefers to take a sponge bath  Home Equipment: rolling walker, single point cane  Transfer Assistance: Independent without use of device  Ambulation Assistance:modified independent with use of cane  ADL Assistance: independent with all ADL's  IADL Assistance: independent with homemaking tasks  Active :        [x] Yes                 [] No  Recent Falls: pt reports use of cane because of dizziness. Feels it helps her \"not feel dizzy. \" No falls in the last 6 months     Examination   Vision:   Vision Corrective Device: wears glasses at all times  Hearing:   hard of hearing    Observation:   General Observation:  NG tube to suction   Posture: Forward head      ROM:   (B) UE AROM WFL  Strength:   (B) UE strength grossly WFL    Therapist Clinical Decision Making (Complexity): medium complexity  Clinical Presentation: evolving      Subjective  General: Pt received supine in bed, agreeable to therapy. Daughter present  Pain: Patient does not rate upon questioning  Pain Interventions: pain medication in place prior to

## 2023-07-29 NOTE — PLAN OF CARE
Problem: Safety - Medical Restraint  Goal: Remains free of injury from restraints (Restraint for Interference with Medical Device)  Description: INTERVENTIONS:  1. Determine that other, less restrictive measures have been tried or would not be effective before applying the restraint  2. Evaluate the patient's condition at the time of restraint application  3. Inform patient/family regarding the reason for restraint  4.  Q2H: Monitor safety, psychosocial status, comfort, nutrition and hydration  Outcome: Completed     Problem: Discharge Planning  Goal: Discharge to home or other facility with appropriate resources  Outcome: Progressing  Flowsheets (Taken 7/29/2023 0320)  Discharge to home or other facility with appropriate resources: Identify discharge learning needs (meds, wound care, etc)     Problem: Pain  Goal: Verbalizes/displays adequate comfort level or baseline comfort level  Outcome: Progressing  Flowsheets (Taken 7/29/2023 0320)  Verbalizes/displays adequate comfort level or baseline comfort level:   Assess pain using appropriate pain scale   Encourage patient to monitor pain and request assistance     Problem: ABCDS Injury Assessment  Goal: Absence of physical injury  Outcome: Progressing  Flowsheets (Taken 7/29/2023 0320)  Absence of Physical Injury: Implement safety measures based on patient assessment

## 2023-07-30 ENCOUNTER — APPOINTMENT (OUTPATIENT)
Dept: GENERAL RADIOLOGY | Age: 88
DRG: 350 | End: 2023-07-30
Payer: MEDICARE

## 2023-07-30 LAB
ANION GAP SERPL CALCULATED.3IONS-SCNC: 7 MMOL/L (ref 3–16)
BASOPHILS # BLD: 0 K/UL (ref 0–0.2)
BASOPHILS NFR BLD: 0.3 %
BUN SERPL-MCNC: 20 MG/DL (ref 7–20)
CALCIUM SERPL-MCNC: 8.7 MG/DL (ref 8.3–10.6)
CHLORIDE SERPL-SCNC: 107 MMOL/L (ref 99–110)
CO2 SERPL-SCNC: 29 MMOL/L (ref 21–32)
CREAT SERPL-MCNC: 0.6 MG/DL (ref 0.6–1.2)
DEPRECATED RDW RBC AUTO: 14 % (ref 12.4–15.4)
EOSINOPHIL # BLD: 0.2 K/UL (ref 0–0.6)
EOSINOPHIL NFR BLD: 3.5 %
GFR SERPLBLD CREATININE-BSD FMLA CKD-EPI: >60 ML/MIN/{1.73_M2}
GLUCOSE SERPL-MCNC: 66 MG/DL (ref 70–99)
HCT VFR BLD AUTO: 34.2 % (ref 36–48)
HGB BLD-MCNC: 11.1 G/DL (ref 12–16)
LYMPHOCYTES # BLD: 0.7 K/UL (ref 1–5.1)
LYMPHOCYTES NFR BLD: 10.9 %
MAGNESIUM SERPL-MCNC: 2 MG/DL (ref 1.8–2.4)
MCH RBC QN AUTO: 30 PG (ref 26–34)
MCHC RBC AUTO-ENTMCNC: 32.5 G/DL (ref 31–36)
MCV RBC AUTO: 92 FL (ref 80–100)
MONOCYTES # BLD: 0.5 K/UL (ref 0–1.3)
MONOCYTES NFR BLD: 6.6 %
NEUTROPHILS # BLD: 5.4 K/UL (ref 1.7–7.7)
NEUTROPHILS NFR BLD: 78.7 %
PLATELET # BLD AUTO: 121 K/UL (ref 135–450)
PMV BLD AUTO: 8.5 FL (ref 5–10.5)
POTASSIUM SERPL-SCNC: 3.3 MMOL/L (ref 3.5–5.1)
RBC # BLD AUTO: 3.72 M/UL (ref 4–5.2)
SODIUM SERPL-SCNC: 143 MMOL/L (ref 136–145)
WBC # BLD AUTO: 6.9 K/UL (ref 4–11)

## 2023-07-30 PROCEDURE — 36415 COLL VENOUS BLD VENIPUNCTURE: CPT

## 2023-07-30 PROCEDURE — 94761 N-INVAS EAR/PLS OXIMETRY MLT: CPT

## 2023-07-30 PROCEDURE — 2700000000 HC OXYGEN THERAPY PER DAY

## 2023-07-30 PROCEDURE — 99024 POSTOP FOLLOW-UP VISIT: CPT | Performed by: SURGERY

## 2023-07-30 PROCEDURE — 2580000003 HC RX 258: Performed by: SURGERY

## 2023-07-30 PROCEDURE — 83735 ASSAY OF MAGNESIUM: CPT

## 2023-07-30 PROCEDURE — 1200000000 HC SEMI PRIVATE

## 2023-07-30 PROCEDURE — 6360000002 HC RX W HCPCS: Performed by: SURGERY

## 2023-07-30 PROCEDURE — 2500000003 HC RX 250 WO HCPCS: Performed by: SURGERY

## 2023-07-30 PROCEDURE — 80048 BASIC METABOLIC PNL TOTAL CA: CPT

## 2023-07-30 PROCEDURE — 6370000000 HC RX 637 (ALT 250 FOR IP): Performed by: SURGERY

## 2023-07-30 PROCEDURE — 74019 RADEX ABDOMEN 2 VIEWS: CPT

## 2023-07-30 PROCEDURE — 85025 COMPLETE CBC W/AUTO DIFF WBC: CPT

## 2023-07-30 PROCEDURE — 6360000002 HC RX W HCPCS: Performed by: PHYSICIAN ASSISTANT

## 2023-07-30 PROCEDURE — C9113 INJ PANTOPRAZOLE SODIUM, VIA: HCPCS | Performed by: PHYSICIAN ASSISTANT

## 2023-07-30 RX ORDER — DEXTROSE MONOHYDRATE, SODIUM CHLORIDE, AND POTASSIUM CHLORIDE 50; 1.49; 4.5 G/1000ML; G/1000ML; G/1000ML
INJECTION, SOLUTION INTRAVENOUS CONTINUOUS
Status: DISCONTINUED | OUTPATIENT
Start: 2023-07-30 | End: 2023-07-31

## 2023-07-30 RX ORDER — FUROSEMIDE 10 MG/ML
20 INJECTION INTRAMUSCULAR; INTRAVENOUS ONCE
Status: COMPLETED | OUTPATIENT
Start: 2023-07-30 | End: 2023-07-30

## 2023-07-30 RX ADMIN — ENOXAPARIN SODIUM 30 MG: 100 INJECTION SUBCUTANEOUS at 08:44

## 2023-07-30 RX ADMIN — MEMANTINE 5 MG: 5 TABLET ORAL at 08:43

## 2023-07-30 RX ADMIN — PIPERACILLIN AND TAZOBACTAM 3375 MG: 3; .375 INJECTION, POWDER, LYOPHILIZED, FOR SOLUTION INTRAVENOUS at 06:41

## 2023-07-30 RX ADMIN — MEMANTINE 5 MG: 5 TABLET ORAL at 22:39

## 2023-07-30 RX ADMIN — KETOROLAC TROMETHAMINE 15 MG: 15 INJECTION, SOLUTION INTRAMUSCULAR; INTRAVENOUS at 10:43

## 2023-07-30 RX ADMIN — PANTOPRAZOLE SODIUM 40 MG: 40 INJECTION, POWDER, FOR SOLUTION INTRAVENOUS at 08:44

## 2023-07-30 RX ADMIN — DIPHENHYDRAMINE HYDROCHLORIDE 25 MG: 50 INJECTION, SOLUTION INTRAMUSCULAR; INTRAVENOUS at 22:41

## 2023-07-30 RX ADMIN — FUROSEMIDE 20 MG: 10 INJECTION, SOLUTION INTRAMUSCULAR; INTRAVENOUS at 12:50

## 2023-07-30 RX ADMIN — KETOROLAC TROMETHAMINE 15 MG: 15 INJECTION, SOLUTION INTRAMUSCULAR; INTRAVENOUS at 22:46

## 2023-07-30 RX ADMIN — PIPERACILLIN AND TAZOBACTAM 3375 MG: 3; .375 INJECTION, POWDER, LYOPHILIZED, FOR SOLUTION INTRAVENOUS at 22:50

## 2023-07-30 RX ADMIN — Medication 10 ML: at 08:44

## 2023-07-30 RX ADMIN — PIPERACILLIN AND TAZOBACTAM 3375 MG: 3; .375 INJECTION, POWDER, LYOPHILIZED, FOR SOLUTION INTRAVENOUS at 14:27

## 2023-07-30 RX ADMIN — POTASSIUM CHLORIDE, DEXTROSE MONOHYDRATE AND SODIUM CHLORIDE: 150; 5; 450 INJECTION, SOLUTION INTRAVENOUS at 10:32

## 2023-07-30 RX ADMIN — PANTOPRAZOLE SODIUM 40 MG: 40 INJECTION, POWDER, FOR SOLUTION INTRAVENOUS at 22:40

## 2023-07-30 RX ADMIN — Medication 10 ML: at 22:53

## 2023-07-30 ASSESSMENT — PAIN DESCRIPTION - DESCRIPTORS
DESCRIPTORS: ACHING
DESCRIPTORS: SORE;ACHING

## 2023-07-30 ASSESSMENT — PAIN SCALES - PAIN ASSESSMENT IN ADVANCED DEMENTIA (PAINAD)
CONSOLABILITY: 0
BREATHING: 0
NEGVOCALIZATION: 0
CONSOLABILITY: 0
TOTALSCORE: 0
TOTALSCORE: 0
FACIALEXPRESSION: 0
FACIALEXPRESSION: 0
CONSOLABILITY: 0
FACIALEXPRESSION: 0
BREATHING: 0
NEGVOCALIZATION: 0
BODYLANGUAGE: 0
BREATHING: 0
BODYLANGUAGE: 0
FACIALEXPRESSION: 0
BODYLANGUAGE: 0
BODYLANGUAGE: 0
NEGVOCALIZATION: 0
BREATHING: 0
CONSOLABILITY: 0
TOTALSCORE: 0
NEGVOCALIZATION: 0
TOTALSCORE: 0

## 2023-07-30 ASSESSMENT — PAIN SCALES - GENERAL
PAINLEVEL_OUTOF10: 6
PAINLEVEL_OUTOF10: 3
PAINLEVEL_OUTOF10: 0
PAINLEVEL_OUTOF10: 0
PAINLEVEL_OUTOF10: 9

## 2023-07-30 ASSESSMENT — PAIN DESCRIPTION - ORIENTATION
ORIENTATION: MID
ORIENTATION: MID

## 2023-07-30 ASSESSMENT — PAIN - FUNCTIONAL ASSESSMENT: PAIN_FUNCTIONAL_ASSESSMENT: PREVENTS OR INTERFERES SOME ACTIVE ACTIVITIES AND ADLS

## 2023-07-30 ASSESSMENT — PAIN DESCRIPTION - LOCATION
LOCATION: ABDOMEN
LOCATION: ABDOMEN

## 2023-07-30 ASSESSMENT — PAIN DESCRIPTION - PAIN TYPE: TYPE: ACUTE PAIN

## 2023-07-30 NOTE — PROGRESS NOTES
Called to pt.'s room for swelling in right arm, leaking noted at peripheral iv site, blood return noted. Stopped iv fluids in this arm and switched to left arm. Notified on call physician, encouraged pt. To perform active range of motion and sitter at bedside will encourage pt. To perform range of motion in right arm as well. Will continue to monitor.

## 2023-07-30 NOTE — CARE COORDINATION
CM met with pt to discuss therapy recommendations of snf. Pt currently has NG tube and sitter so she doesn't pull at tubing. Pt does not really feel like talking. CM called daughter Dulce Damon who will be back in later today. Agreeable for insurance and ratings list to be left at bedside for her to review. CM number and CM office number on list for her to leave message of snf choices.     Author ROB Hood, BSN  [phone]

## 2023-07-30 NOTE — PROGRESS NOTES
0840: Shift assessment completed, morning medications given per MAR. VSS, alert and oriented to person. Call light within reach. The care plan and education has been reviewed and mutually agreed upon with the patient.

## 2023-07-30 NOTE — PLAN OF CARE
Problem: Discharge Planning  Goal: Discharge to home or other facility with appropriate resources  Outcome: Progressing  Flowsheets (Taken 7/29/2023 2036)  Discharge to home or other facility with appropriate resources: Identify barriers to discharge with patient and caregiver     Problem: Pain  Goal: Verbalizes/displays adequate comfort level or baseline comfort level  Outcome: Progressing  Flowsheets (Taken 7/29/2023 1950)  Verbalizes/displays adequate comfort level or baseline comfort level: Assess pain using appropriate pain scale     Problem: Hematologic - Adult  Goal: Maintains hematologic stability  Outcome: Progressing  Flowsheets (Taken 7/29/2023 2036)  Maintains hematologic stability: Assess for signs and symptoms of bleeding or hemorrhage     Problem: Safety - Adult  Goal: Free from fall injury  Outcome: Progressing  Flowsheets (Taken 7/30/2023 0057)  Free From Fall Injury: Instruct family/caregiver on patient safety     Problem: Skin/Tissue Integrity  Goal: Absence of new skin breakdown  Description: 1. Monitor for areas of redness and/or skin breakdown  2. Assess vascular access sites hourly  3. Every 4-6 hours minimum:  Change oxygen saturation probe site  4. Every 4-6 hours:  If on nasal continuous positive airway pressure, respiratory therapy assess nares and determine need for appliance change or resting period.   Outcome: Progressing     Problem: ABCDS Injury Assessment  Goal: Absence of physical injury  Outcome: Progressing  Flowsheets (Taken 7/30/2023 0057)  Absence of Physical Injury: Implement safety measures based on patient assessment     Problem: Risk for Elopement  Goal: Patient will not exit the unit/facility without proper excort  Outcome: Progressing  Flowsheets (Taken 7/30/2023 0000)  Nursing Interventions for Elopement Risk:   Assist with personal care needs such as toileting, eating, dressing, as needed to reduce the risk of wandering   Collaborate with family members/caregivers

## 2023-07-30 NOTE — PLAN OF CARE
Problem: Discharge Planning  Goal: Discharge to home or other facility with appropriate resources  7/30/2023 0917 by Martin Clayton RN  Outcome: Progressing  7/30/2023 0150 by Brooke Marion RN  Outcome: Progressing  Flowsheets (Taken 7/29/2023 2036)  Discharge to home or other facility with appropriate resources: Identify barriers to discharge with patient and caregiver     Problem: Pain  Goal: Verbalizes/displays adequate comfort level or baseline comfort level  7/30/2023 0917 by Martin Clayton RN  Outcome: Progressing  7/30/2023 0150 by Brooke Marion RN  Outcome: Progressing  Flowsheets (Taken 7/29/2023 1950)  Verbalizes/displays adequate comfort level or baseline comfort level: Assess pain using appropriate pain scale     Problem: Hematologic - Adult  Goal: Maintains hematologic stability  7/30/2023 0917 by Martin Clayton RN  Outcome: Progressing  7/30/2023 0150 by Brooke Marion RN  Outcome: Progressing  Flowsheets (Taken 7/29/2023 2036)  Maintains hematologic stability: Assess for signs and symptoms of bleeding or hemorrhage     Problem: Safety - Adult  Goal: Free from fall injury  7/30/2023 0917 by Martin Clayton RN  Outcome: Progressing  7/30/2023 0150 by Brooke Marion RN  Outcome: Progressing  Flowsheets (Taken 7/30/2023 0057)  Free From Fall Injury: Instruct family/caregiver on patient safety     Problem: Skin/Tissue Integrity  Goal: Absence of new skin breakdown  Description: 1. Monitor for areas of redness and/or skin breakdown  2. Assess vascular access sites hourly  3. Every 4-6 hours minimum:  Change oxygen saturation probe site  4. Every 4-6 hours:  If on nasal continuous positive airway pressure, respiratory therapy assess nares and determine need for appliance change or resting period.   7/30/2023 5019 by Martin Clayton RN  Outcome: Progressing  7/30/2023 0150 by Brooke Marion RN  Outcome: Progressing     Problem: ABCDS Injury Assessment  Goal: Absence of physical

## 2023-07-30 NOTE — PROGRESS NOTES
Randell Timmons is a 81 y.o. female patient.     CC-SBO    HPI-81year-old female seen in follow-up status post laparoscopic repair of a ventral hernia and right inguinal hernia      Current Facility-Administered Medications   Medication Dose Route Frequency Provider Last Rate Last Admin    dextrose 5 % and 0.45 % NaCl with KCl 20 mEq infusion   IntraVENous Continuous Ermelinda Hightower MD 80 mL/hr at 07/30/23 1032 New Bag at 07/30/23 1032    pantoprazole (PROTONIX) injection 40 mg  40 mg IntraVENous BID Kera Jose Angel PA-C   40 mg at 07/30/23 0844    diphenhydrAMINE (BENADRYL) injection 25 mg  25 mg IntraVENous Q6H PRN Benton Cooks, MD   25 mg at 07/28/23 2144    morphine (PF) injection 2 mg  2 mg IntraVENous Q2H PRN Benton Cooks, MD   2 mg at 07/28/23 2143    ketorolac (TORADOL) injection 15 mg  15 mg IntraVENous Q6H PRN Benton Cooks, MD   15 mg at 07/30/23 1043    phenol 1.4 % mouth spray 1 spray  1 spray Mouth/Throat Q2H PRN Benton Cooks, MD        benzocaine-menthol (CEPACOL SORE THROAT) lozenge   Buccal PRN Benton Cooks, MD        piperacillin-tazobactam (ZOSYN) 3,375 mg in sodium chloride 0.9 % 50 mL IVPB (mini-bag)  3,375 mg IntraVENous Jeovany Meléndez MD   Stopped at 07/30/23 1041    memantine (NAMENDA) tablet 5 mg  5 mg Oral BID Benton Cooks, MD   5 mg at 07/30/23 0843    morphine (PF) injection 1 mg  1 mg IntraVENous Q4H PRN Benton Cooks, MD   1 mg at 07/28/23 6154    sodium chloride flush 0.9 % injection 5-40 mL  5-40 mL IntraVENous 2 times per day Benton Cooks, MD   10 mL at 07/30/23 0844    sodium chloride flush 0.9 % injection 5-40 mL  5-40 mL IntraVENous PRN Benton Cooks, MD   10 mL at 07/28/23 0525    0.9 % sodium chloride infusion   IntraVENous PRN Benton Cooks,  mL/hr at 07/27/23 2244 25 mL at 07/27/23 6516    enoxaparin Sodium (LOVENOX) injection 30 mg  30 mg SubCUTAneous Daily

## 2023-07-31 LAB
ANION GAP SERPL CALCULATED.3IONS-SCNC: 9 MMOL/L (ref 3–16)
BACTERIA BLD CULT ORG #2: NORMAL
BACTERIA BLD CULT: NORMAL
BASOPHILS # BLD: 0 K/UL (ref 0–0.2)
BASOPHILS NFR BLD: 0.4 %
BUN SERPL-MCNC: 9 MG/DL (ref 7–20)
CALCIUM SERPL-MCNC: 9.1 MG/DL (ref 8.3–10.6)
CHLORIDE SERPL-SCNC: 101 MMOL/L (ref 99–110)
CO2 SERPL-SCNC: 30 MMOL/L (ref 21–32)
CREAT SERPL-MCNC: <0.5 MG/DL (ref 0.6–1.2)
DEPRECATED RDW RBC AUTO: 13.3 % (ref 12.4–15.4)
EOSINOPHIL # BLD: 0.1 K/UL (ref 0–0.6)
EOSINOPHIL NFR BLD: 1.6 %
GFR SERPLBLD CREATININE-BSD FMLA CKD-EPI: >60 ML/MIN/{1.73_M2}
GLUCOSE SERPL-MCNC: 146 MG/DL (ref 70–99)
HCT VFR BLD AUTO: 39.8 % (ref 36–48)
HGB BLD-MCNC: 13.2 G/DL (ref 12–16)
LYMPHOCYTES # BLD: 1 K/UL (ref 1–5.1)
LYMPHOCYTES NFR BLD: 12.6 %
MAGNESIUM SERPL-MCNC: 1.9 MG/DL (ref 1.8–2.4)
MCH RBC QN AUTO: 30.2 PG (ref 26–34)
MCHC RBC AUTO-ENTMCNC: 33.2 G/DL (ref 31–36)
MCV RBC AUTO: 91.1 FL (ref 80–100)
MONOCYTES # BLD: 0.5 K/UL (ref 0–1.3)
MONOCYTES NFR BLD: 6.4 %
NEUTROPHILS # BLD: 6.5 K/UL (ref 1.7–7.7)
NEUTROPHILS NFR BLD: 79 %
PLATELET # BLD AUTO: 146 K/UL (ref 135–450)
PMV BLD AUTO: 8.2 FL (ref 5–10.5)
POTASSIUM SERPL-SCNC: 3 MMOL/L (ref 3.5–5.1)
RBC # BLD AUTO: 4.37 M/UL (ref 4–5.2)
SODIUM SERPL-SCNC: 140 MMOL/L (ref 136–145)
WBC # BLD AUTO: 8.2 K/UL (ref 4–11)

## 2023-07-31 PROCEDURE — 2700000000 HC OXYGEN THERAPY PER DAY

## 2023-07-31 PROCEDURE — APPNB30 APP NON BILLABLE TIME 0-30 MINS: Performed by: NURSE PRACTITIONER

## 2023-07-31 PROCEDURE — 6360000002 HC RX W HCPCS: Performed by: SURGERY

## 2023-07-31 PROCEDURE — 94761 N-INVAS EAR/PLS OXIMETRY MLT: CPT

## 2023-07-31 PROCEDURE — 85025 COMPLETE CBC W/AUTO DIFF WBC: CPT

## 2023-07-31 PROCEDURE — 1200000000 HC SEMI PRIVATE

## 2023-07-31 PROCEDURE — 6370000000 HC RX 637 (ALT 250 FOR IP): Performed by: SURGERY

## 2023-07-31 PROCEDURE — 80048 BASIC METABOLIC PNL TOTAL CA: CPT

## 2023-07-31 PROCEDURE — 6360000002 HC RX W HCPCS: Performed by: NURSE PRACTITIONER

## 2023-07-31 PROCEDURE — 99024 POSTOP FOLLOW-UP VISIT: CPT | Performed by: SURGERY

## 2023-07-31 PROCEDURE — APPSS15 APP SPLIT SHARED TIME 0-15 MINUTES: Performed by: NURSE PRACTITIONER

## 2023-07-31 PROCEDURE — 2580000003 HC RX 258: Performed by: SURGERY

## 2023-07-31 PROCEDURE — C9113 INJ PANTOPRAZOLE SODIUM, VIA: HCPCS | Performed by: PHYSICIAN ASSISTANT

## 2023-07-31 PROCEDURE — 83735 ASSAY OF MAGNESIUM: CPT

## 2023-07-31 PROCEDURE — 6370000000 HC RX 637 (ALT 250 FOR IP): Performed by: PHYSICIAN ASSISTANT

## 2023-07-31 PROCEDURE — 36415 COLL VENOUS BLD VENIPUNCTURE: CPT

## 2023-07-31 PROCEDURE — 6370000000 HC RX 637 (ALT 250 FOR IP): Performed by: NURSE PRACTITIONER

## 2023-07-31 PROCEDURE — 6360000002 HC RX W HCPCS: Performed by: PHYSICIAN ASSISTANT

## 2023-07-31 PROCEDURE — 2500000003 HC RX 250 WO HCPCS: Performed by: SURGERY

## 2023-07-31 RX ORDER — HALOPERIDOL 5 MG/ML
2 INJECTION INTRAMUSCULAR EVERY 8 HOURS PRN
Status: DISCONTINUED | OUTPATIENT
Start: 2023-07-31 | End: 2023-07-31

## 2023-07-31 RX ORDER — POTASSIUM CHLORIDE 7.45 MG/ML
10 INJECTION INTRAVENOUS
Status: COMPLETED | OUTPATIENT
Start: 2023-07-31 | End: 2023-07-31

## 2023-07-31 RX ORDER — LORAZEPAM 2 MG/ML
0.5 INJECTION INTRAMUSCULAR ONCE
Status: COMPLETED | OUTPATIENT
Start: 2023-07-31 | End: 2023-07-31

## 2023-07-31 RX ORDER — LIDOCAINE 4 G/G
1 PATCH TOPICAL DAILY
Status: DISCONTINUED | OUTPATIENT
Start: 2023-07-31 | End: 2023-08-02 | Stop reason: HOSPADM

## 2023-07-31 RX ORDER — ACETAMINOPHEN 500 MG
1000 TABLET ORAL EVERY 8 HOURS SCHEDULED
Status: DISCONTINUED | OUTPATIENT
Start: 2023-07-31 | End: 2023-08-02 | Stop reason: HOSPADM

## 2023-07-31 RX ORDER — QUETIAPINE FUMARATE 25 MG/1
12.5 TABLET, FILM COATED ORAL NIGHTLY
Status: DISCONTINUED | OUTPATIENT
Start: 2023-07-31 | End: 2023-08-02 | Stop reason: HOSPADM

## 2023-07-31 RX ORDER — POTASSIUM CHLORIDE 20 MEQ/1
20 TABLET, EXTENDED RELEASE ORAL ONCE
Status: COMPLETED | OUTPATIENT
Start: 2023-07-31 | End: 2023-07-31

## 2023-07-31 RX ORDER — DOCUSATE SODIUM 100 MG/1
100 CAPSULE, LIQUID FILLED ORAL 2 TIMES DAILY
Status: DISCONTINUED | OUTPATIENT
Start: 2023-07-31 | End: 2023-08-02 | Stop reason: HOSPADM

## 2023-07-31 RX ADMIN — ENOXAPARIN SODIUM 30 MG: 100 INJECTION SUBCUTANEOUS at 08:05

## 2023-07-31 RX ADMIN — POTASSIUM CHLORIDE 10 MEQ: 10 INJECTION, SOLUTION INTRAVENOUS at 11:06

## 2023-07-31 RX ADMIN — POTASSIUM CHLORIDE 10 MEQ: 10 INJECTION, SOLUTION INTRAVENOUS at 09:11

## 2023-07-31 RX ADMIN — POTASSIUM CHLORIDE 10 MEQ: 10 INJECTION, SOLUTION INTRAVENOUS at 12:15

## 2023-07-31 RX ADMIN — POTASSIUM CHLORIDE 10 MEQ: 10 INJECTION, SOLUTION INTRAVENOUS at 10:06

## 2023-07-31 RX ADMIN — POTASSIUM CHLORIDE, DEXTROSE MONOHYDRATE AND SODIUM CHLORIDE: 150; 5; 450 INJECTION, SOLUTION INTRAVENOUS at 01:11

## 2023-07-31 RX ADMIN — MEMANTINE 5 MG: 5 TABLET ORAL at 19:56

## 2023-07-31 RX ADMIN — HALOPERIDOL LACTATE 2 MG: 5 INJECTION, SOLUTION INTRAMUSCULAR at 00:21

## 2023-07-31 RX ADMIN — MEMANTINE 5 MG: 5 TABLET ORAL at 08:05

## 2023-07-31 RX ADMIN — PANTOPRAZOLE SODIUM 40 MG: 40 INJECTION, POWDER, FOR SOLUTION INTRAVENOUS at 08:05

## 2023-07-31 RX ADMIN — LORAZEPAM 0.5 MG: 2 INJECTION INTRAMUSCULAR; INTRAVENOUS at 03:31

## 2023-07-31 RX ADMIN — Medication 10 ML: at 08:05

## 2023-07-31 RX ADMIN — DOCUSATE SODIUM 100 MG: 100 CAPSULE, LIQUID FILLED ORAL at 14:36

## 2023-07-31 RX ADMIN — PIPERACILLIN AND TAZOBACTAM 3375 MG: 3; .375 INJECTION, POWDER, LYOPHILIZED, FOR SOLUTION INTRAVENOUS at 07:03

## 2023-07-31 RX ADMIN — ACETAMINOPHEN 1000 MG: 500 TABLET ORAL at 14:36

## 2023-07-31 RX ADMIN — PANTOPRAZOLE SODIUM 40 MG: 40 INJECTION, POWDER, FOR SOLUTION INTRAVENOUS at 19:56

## 2023-07-31 RX ADMIN — POTASSIUM CHLORIDE 20 MEQ: 1500 TABLET, EXTENDED RELEASE ORAL at 09:18

## 2023-07-31 ASSESSMENT — PAIN DESCRIPTION - LOCATION: LOCATION: ABDOMEN

## 2023-07-31 ASSESSMENT — PAIN DESCRIPTION - ONSET: ONSET: ON-GOING

## 2023-07-31 ASSESSMENT — PAIN DESCRIPTION - ORIENTATION: ORIENTATION: MID

## 2023-07-31 ASSESSMENT — PAIN DESCRIPTION - FREQUENCY: FREQUENCY: CONTINUOUS

## 2023-07-31 ASSESSMENT — PAIN DESCRIPTION - DESCRIPTORS: DESCRIPTORS: ACHING

## 2023-07-31 ASSESSMENT — PAIN DESCRIPTION - PAIN TYPE: TYPE: SURGICAL PAIN

## 2023-07-31 ASSESSMENT — PAIN SCALES - GENERAL: PAINLEVEL_OUTOF10: 10

## 2023-07-31 NOTE — PROGRESS NOTES
0800: Shift assessment completed, morning medications given per MAR. VSS, alert and oriented to self. Restraints remain in place. Call light within reach. The care plan and education has been reviewed and mutually agreed upon with the patient.

## 2023-07-31 NOTE — PROGRESS NOTES
Sitter requested assist, due to patient continue to be aggressive, fidgety and attempting to get out of bed. Upon assessing the situation, patient exhibiting aggression, combative and unable to be redirected easily. RN reached out to MD, new orders for PRN Haldol 2 mg IM every 8 hours for agitation. Patient received a dose, will continue to monitor the affects. Sitter continue to be at bedside, safety measures in place and RN educated sitter on how to deescalate the patient during times of agitation. RN will continue to monitor.

## 2023-07-31 NOTE — PROGRESS NOTES
Nutrition Note    RECOMMENDATIONS  PO Diet: advance as tolerated or start nutrition support     NUTRITION ASSESSMENT   Pt admitted for SBO. Currently NPO x 3 days of admission. Pt is confused and now in restraints after pulling out NGT. [facility] is from [facility] taken back in 5/23. Per dtr, wt was 107 lb x 1 month ago, & she believes this was stable. Expect diet to advance as bowel function returns; +BM reported this am.  Will monitor diet progression & need for ONS. Nutrition Related Findings: No edema noted; +BM 7/31. Wounds: Surgical Incision  Nutrition Education:  Education not indicated   Nutrition Goals: Initiate PO diet     MALNUTRITION ASSESSMENT   Acute Illness  Malnutrition Status: Insufficient data    NUTRITION DIAGNOSIS   Inadequate oral intake related to altered GI function as evidenced by NPO or clear liquid status due to medical condition      CURRENT NUTRITION THERAPIES  Diet NPO Exceptions are: Popsicles, Ice Chips, Sips of Water with Meds     PO Intake: NPO   PO Supplement Intake:NPO    Additional Calorie Sources:  D5 & .45% NS @ 80 ml/hr provides 326 kcal/day from dextrose    ANTHROPOMETRICS  Current Height: 5' 6\" (167.6 cm)  Current Weight - Scale: 104 lb (47.2 kg)    Ideal Body Weight (IBW): 130 lbs  (59 kg)        BMI: 17.3      COMPARATIVE STANDARDS  Total Energy Requirements (kcals/day): 1410- 1645     Protein (g):  71- 94g       Fluid (mL/day):  1 ml/kcal    The patient will be monitored per nutrition standards of care. Consult dietitian if additional nutrition interventions are needed prior to RD reassessment.      Mylene Ceballos RD, LD    Contact: [phone]

## 2023-07-31 NOTE — CARE COORDINATION
Discharge Planning Note:    Met with Marcos Zepeda, daughter. An approved SNF list was reviewed and the following referrals were placed:    - [facility] - waiting on response    - [facility] - waiting on response    - [facility] - waiting on response    Will continue to follow.     YASEMIN GrimaldoN RN    [facility]  Phone: [phone]

## 2023-07-31 NOTE — PLAN OF CARE
Patient alert and oriented, with forgetfulness and needing constant redirection. Patient remain free form injury or fall at this time. D/C plan in process. RN will continue to monitor and treat per orders.

## 2023-07-31 NOTE — PROGRESS NOTES
[phone] Hospital or Facility: [facility] From: Jane Johnson RE: Catrina Valdez 1935 RM: [id number] Please read nursing notes: Patient continue to be agitated after receiving 2 mg of Haldol IM, continue to pull at medical devices, at 0320 pulled out NGT, continue to attempt to get out of bed, and becomes combative with sitter at bedside during attempts to redirect patient. Patient is at risk for injury or fall. May I have orders to place patient in restraints and replace NGT? Need Callback: NO CALLBACK REQ 3A TELEMETRY URGENT, waiting orders or recommendations.

## 2023-07-31 NOTE — PROGRESS NOTES
Sitter at bedside, requested assist, as patient has became combative, and aggressive to staff. Patient alert and oriented, follow commands and voice her concerns she does not need a sitter, and that she don't like being treated like a child. RN educated patient on safety precaution and explained she needs a sitter due to her increase anxiety, fidgety, aggressive and combative behavior. Patient educated on inappropriate behaviors and what she needed to do to have the sitter order discontinued. There was no signs that the patient understood as she continue to be aggressive, have anxiety, attempting to get out of bed, pulling at medical devices. Patient requested pain medication, received PRN Toradol for pain, Benadryl for itching at the site of her abdomen that has a red rash, and antibiotic per scheduled order started. Patient PIV flush easily, site unremarkable. Pure Wick in place and to low wall suctioning, verified suctioning to be set at 40. Patient easily redirected by RN and verbalized she will not listen to anyone, except the nurses and the Doctor. RN will continue to monitor and follow orders.

## 2023-07-31 NOTE — PROGRESS NOTES
RN administered 2 mg Haldol IM, and Ativan 0.5 mg, patient continue to be aggressive, agitated, and pulling at medical advices. Are we reinserting the NGT? Can we please have orders for restraints for patient's safety?

## 2023-07-31 NOTE — PLAN OF CARE
Patient remain free from injury at this time. Patient received 0.5 mg of Ativan per new order. Patient bilateral wrist restraints, circulation check started and patient continue to have close monitor from assigned sitter at bedside. All other safety measures remain in place. Patient remain free from fall or injury. RN will continue to monitor and treat at ordered.

## 2023-07-31 NOTE — PLAN OF CARE
Problem: Discharge Planning  Goal: Discharge to home or other facility with appropriate resources  7/31/2023 1021 by Alejandro Morni RN  Outcome: Progressing  7/31/2023 0400 by Irish Jacobs RN  Outcome: Progressing     Problem: Pain  Goal: Verbalizes/displays adequate comfort level or baseline comfort level  7/31/2023 1021 by Alejandro Morin RN  Outcome: Progressing  7/31/2023 0400 by Irish Jacobs RN  Outcome: Progressing  7/31/2023 0344 by Irish Jacobs RN  Outcome: Progressing  7/31/2023 0344 by Irish Jacobs RN  Outcome: Progressing     Problem: Hematologic - Adult  Goal: Maintains hematologic stability  7/31/2023 1021 by Alejandro Morin RN  Outcome: Progressing  7/31/2023 0400 by Irish Jacobs RN  Outcome: Progressing     Problem: Safety - Adult  Goal: Free from fall injury  7/31/2023 1021 by Alejandro Morin RN  Outcome: Progressing  7/31/2023 0400 by Irish Jacobs RN  Outcome: Progressing  7/31/2023 0344 by Irish Jacobs RN  Outcome: Progressing  7/31/2023 0344 by Irish Jacobs RN  Outcome: Progressing     Problem: Skin/Tissue Integrity  Goal: Absence of new skin breakdown  Description: 1. Monitor for areas of redness and/or skin breakdown  2. Assess vascular access sites hourly  3. Every 4-6 hours minimum:  Change oxygen saturation probe site  4. Every 4-6 hours:  If on nasal continuous positive airway pressure, respiratory therapy assess nares and determine need for appliance change or resting period.   7/31/2023 1021 by Alejandro Morin RN  Outcome: Progressing  7/31/2023 0400 by Irish Jacobs RN  Outcome: Progressing  7/31/2023 0344 by Irish Jacobs RN  Outcome: Progressing  7/31/2023 0344 by Irish Jacobs RN  Outcome: Progressing     Problem: ABCDS Injury Assessment  Goal: Absence of physical injury  7/31/2023 1021 by Alejandro Morin RN  Outcome: Progressing  7/31/2023 0400 by Irish Jacobs RN  Outcome:

## 2023-07-31 NOTE — PROGRESS NOTES
Upon reassessing patient's behavior after receiving 2 mg of Haldol, patient's behavior has improve some, however, continue to be fidgety, and moving slowing, as she no longer picking and pulling at medical devices. She continue to fidget with bed buttons and bed controls. Sitter at bedside and continue to monitor patient to continue to keep patient safe. RN will continue to monitor behaviors and treat per orders.

## 2023-07-31 NOTE — PROGRESS NOTES
Upon entering patient's room, patient alert and oriented to self, situation, answer all questions appropriately, follow commands and very fidgety as she constantly picking at bed linen, covers, medical equipment and compression leg devices. Patient attempts to get out of bed, and needs much redirections, however, at times can become aggressive to staff and combative. Patient continue to have and need sitter at bedside for patient's safety. Patient is in a low bed, wheels verified to be locked, personal belonging, bedside table and call light within reach. Bed alarm verified to be on. Patient denies pain at present. Patient is non-telly. Patient on 2 liters of oxygen, per nasal cannula. Patient constantly removing or attempting to remove nasal cannula tubing from nares. RN assessed oxygen saturation, on room air, patient drop down into the low 80%, and increase saturation once oxygen replaced back in patient's nares, patient continue on continuous pulse oxymetry for close monitoring and remains on 2 liter oxygen per nasal cannula. Patient remain NPO at this time, able to eat ice chips only. NGT in place at 65 cm with green drainage, and connected to low wall suctioning. Patient has 5 abdominal surgical lap sites, healing, however, patient has a rash on abdomen, per handoff report MD aware, continue to monitor and administer PRN Benadryl for itching. Patient without any signs of nausea and vomiting. RN will continue to monitor and follow orders.

## 2023-08-01 LAB
ANION GAP SERPL CALCULATED.3IONS-SCNC: 8 MMOL/L (ref 3–16)
BUN SERPL-MCNC: 9 MG/DL (ref 7–20)
CALCIUM SERPL-MCNC: 9.1 MG/DL (ref 8.3–10.6)
CHLORIDE SERPL-SCNC: 107 MMOL/L (ref 99–110)
CO2 SERPL-SCNC: 28 MMOL/L (ref 21–32)
CREAT SERPL-MCNC: <0.5 MG/DL (ref 0.6–1.2)
GFR SERPLBLD CREATININE-BSD FMLA CKD-EPI: >60 ML/MIN/{1.73_M2}
GLUCOSE SERPL-MCNC: 108 MG/DL (ref 70–99)
MAGNESIUM SERPL-MCNC: 1.8 MG/DL (ref 1.8–2.4)
POTASSIUM SERPL-SCNC: 3.3 MMOL/L (ref 3.5–5.1)
SODIUM SERPL-SCNC: 143 MMOL/L (ref 136–145)

## 2023-08-01 PROCEDURE — 2580000003 HC RX 258: Performed by: SURGERY

## 2023-08-01 PROCEDURE — APPNB30 APP NON BILLABLE TIME 0-30 MINS: Performed by: NURSE PRACTITIONER

## 2023-08-01 PROCEDURE — 36415 COLL VENOUS BLD VENIPUNCTURE: CPT

## 2023-08-01 PROCEDURE — 6370000000 HC RX 637 (ALT 250 FOR IP): Performed by: SURGERY

## 2023-08-01 PROCEDURE — 99024 POSTOP FOLLOW-UP VISIT: CPT | Performed by: SURGERY

## 2023-08-01 PROCEDURE — 1200000000 HC SEMI PRIVATE

## 2023-08-01 PROCEDURE — 6370000000 HC RX 637 (ALT 250 FOR IP): Performed by: NURSE PRACTITIONER

## 2023-08-01 PROCEDURE — 83735 ASSAY OF MAGNESIUM: CPT

## 2023-08-01 PROCEDURE — 2700000000 HC OXYGEN THERAPY PER DAY

## 2023-08-01 PROCEDURE — 6360000002 HC RX W HCPCS: Performed by: PHYSICIAN ASSISTANT

## 2023-08-01 PROCEDURE — 6370000000 HC RX 637 (ALT 250 FOR IP): Performed by: PHYSICIAN ASSISTANT

## 2023-08-01 PROCEDURE — 80048 BASIC METABOLIC PNL TOTAL CA: CPT

## 2023-08-01 PROCEDURE — 6360000002 HC RX W HCPCS: Performed by: SURGERY

## 2023-08-01 PROCEDURE — C9113 INJ PANTOPRAZOLE SODIUM, VIA: HCPCS | Performed by: PHYSICIAN ASSISTANT

## 2023-08-01 PROCEDURE — 94761 N-INVAS EAR/PLS OXIMETRY MLT: CPT

## 2023-08-01 PROCEDURE — APPSS15 APP SPLIT SHARED TIME 0-15 MINUTES: Performed by: NURSE PRACTITIONER

## 2023-08-01 PROCEDURE — 92526 ORAL FUNCTION THERAPY: CPT

## 2023-08-01 PROCEDURE — 92610 EVALUATE SWALLOWING FUNCTION: CPT

## 2023-08-01 RX ORDER — POTASSIUM CHLORIDE 20 MEQ/1
40 TABLET, EXTENDED RELEASE ORAL ONCE
Status: COMPLETED | OUTPATIENT
Start: 2023-08-01 | End: 2023-08-01

## 2023-08-01 RX ORDER — PANTOPRAZOLE SODIUM 40 MG/1
40 TABLET, DELAYED RELEASE ORAL
Status: DISCONTINUED | OUTPATIENT
Start: 2023-08-01 | End: 2023-08-02 | Stop reason: HOSPADM

## 2023-08-01 RX ADMIN — ACETAMINOPHEN 1000 MG: 500 TABLET ORAL at 13:33

## 2023-08-01 RX ADMIN — Medication 10 ML: at 09:21

## 2023-08-01 RX ADMIN — POTASSIUM CHLORIDE 40 MEQ: 1500 TABLET, EXTENDED RELEASE ORAL at 09:21

## 2023-08-01 RX ADMIN — MEMANTINE 5 MG: 5 TABLET ORAL at 09:20

## 2023-08-01 RX ADMIN — PANTOPRAZOLE SODIUM 40 MG: 40 INJECTION, POWDER, FOR SOLUTION INTRAVENOUS at 09:20

## 2023-08-01 RX ADMIN — MEMANTINE 5 MG: 5 TABLET ORAL at 21:30

## 2023-08-01 RX ADMIN — Medication 10 ML: at 21:34

## 2023-08-01 RX ADMIN — ACETAMINOPHEN 1000 MG: 500 TABLET ORAL at 21:31

## 2023-08-01 RX ADMIN — DOCUSATE SODIUM 100 MG: 100 CAPSULE, LIQUID FILLED ORAL at 09:21

## 2023-08-01 RX ADMIN — QUETIAPINE FUMARATE 12.5 MG: 25 TABLET ORAL at 21:30

## 2023-08-01 RX ADMIN — DOCUSATE SODIUM 100 MG: 100 CAPSULE, LIQUID FILLED ORAL at 21:31

## 2023-08-01 RX ADMIN — PANTOPRAZOLE SODIUM 40 MG: 40 TABLET, DELAYED RELEASE ORAL at 15:39

## 2023-08-01 RX ADMIN — ENOXAPARIN SODIUM 30 MG: 100 INJECTION SUBCUTANEOUS at 09:21

## 2023-08-01 NOTE — PROGRESS NOTES
Shift assessment complete. Meds given per eMAR. Pt drowsy and wants to rest. VSS. Sitter remains at bedside. Will continue to monitor. Call light within reach.    Electronically signed by Jus Clement RN on 8/1/2023 at 2:56 AM

## 2023-08-01 NOTE — PROGRESS NOTES
Facility/Department: [address] ORTHO/NEURO NURSING  Speech Language Pathology  DYSPHAGIA BEDSIDE SWALLOW EVALUATION     Patient: Andree Gonzalez   : 1935   MRN: [id number]      Evaluation Date: 2023   Admitting Diagnosis: Small bowel obstruction ([address]) [K56.609]  SBO (small bowel obstruction) ([address]) [K56.609]  Sepsis without acute organ dysfunction, due to unspecified organism ([address]) [A41.9]  Pain: Denies                                                       H&P: Andree Gonzalez is a 81 y.o. female with a history of hypertension, hyperlipidemia, CAD, MI, anemia and dementia who presents to the emergency department today complaining of abdominal pain and distention that began this morning. Patient states she has had very firm stools that are little balls. She describes her abdominal pain as a throbbing, constant, 10/10 pain that seems to generalize all around her abdomen. She denies nausea, vomiting or diarrhea. She denies melena or hematochezia. She has no urinary complaints. She denies fever or chills. Imaging:  Chest X-ray:   IMPRESSION:  Small pleural effusions and bibasilar airspace opacities that could represent  atelectasis or pneumonia. Airspace opacities are increased from the prior  study. History/Prior Level of Function:   Living Status: Home  Prior Dysphagia History: No prior dysphagia history noted per chart review. Currently, Pt's daughter reports progressive dementia which has become progressively worse. Nurse also reports coughing with thin liquids this am.   Reason for referral: SLP evaluation orders received due to coughing with intake , limited PO intake , decline in medical status , altered mental status, respiratory status , and cognitive state     Dysphagia Impressions/Diagnosis: Oropharyngeal Dysphagia   Pt lethargic but is easily awakened with verbal prompts. Pt currently maintaining O2 sats on 2L O2 via nasal canula.  Pt intermittently confused with poor comprehension of role of SLP, results of assessment, recommendations and general speech pathology plan of care. [] Pt verbalized understanding and agreement   [x] Pt requires ongoing learning   [] No evidence of comprehension     If patient discharges prior to next visit, this note will serve as discharge.      Treatment time:  Timed Code Treatment Minutes: 0 min  Total Treatment time:30 min    Oksana WRIGHT-JASSON#[id number]

## 2023-08-01 NOTE — PROGRESS NOTES
2284: Shift assessment completed, morning medications given per MAR. VSS, alert and oriented to self. Call light within reach. The care plan and education has been reviewed and mutually agreed upon with the patient. 2414: Sitter discontinued and out of room at 0950.

## 2023-08-01 NOTE — CARE COORDINATION
Discharge Planning Note:    Update:    [facility] - ACCEPTED    Met with Evan Rangel, daughter:    - Daughter is in agreement with starting a pre-cert    - Pre-cert - STARTED on 67/17/6449    Will continue to follow.     YASEMIN BeltranN RN    [facility]  Phone: [phone]

## 2023-08-01 NOTE — PROGRESS NOTES
membranes moist,  Cardiovascular: Regular rate and rhythm no murmurs appreciated  Respiratory: lungs are clear with no wheezing , rales or ronchi   Gastrointestinal: Abdomen soft, non-tender,  with active bowel sounds. Operative incisions are unremarkable. Bruising is noted. BS+,   EXT: no edema, distal pulses are palpable. Skin: Warm, dry, no rashes appreciated,  thin skin, poor turgor   Neuro:  alert and conversant. Normal affect     Labs and Tests:  CBC:   Recent Labs     07/30/23  0545 07/31/23  0721   WBC 6.9 8.2   HGB 11.1* 13.2   * 146       BMP:    Recent Labs     07/30/23  0545 07/31/23  0721 08/01/23  0501    140 143   K 3.3* 3.0* 3.3*    101 107   CO2 29 30 28   BUN 20 9 9   CREATININE 0.6 <0.5* <0.5*   GLUCOSE 66* 146* 108*       Hepatic:   No results for input(s): AST, ALT, ALB, BILITOT, ALKPHOS in the last 72 hours. XR ABDOMEN (2 VIEWS)   Final Result   Decreased small bowel distention      Bilateral lower lobe consolidation and adjacent pleural effusions         XR CHEST PORTABLE   Final Result   Small pleural effusions and bibasilar airspace opacities that could represent   atelectasis or pneumonia. Airspace opacities are increased from the prior   study. XR ABDOMEN (2 VIEWS)   Final Result   1. NG tube in proper position within the stomach. 2. Bowel gas pattern remains consistent with a mild small bowel ileus or   obstruction. 3. No evidence of free air. XR ABDOMEN FOR NG/OG/NE TUBE PLACEMENT   Final Result   Enteric tube with tip and side-port in the proximal stomach         CT ABDOMEN PELVIS W IV CONTRAST Additional Contrast? None   Final Result   1. Abnormal small bowel distension with high suspicion of mid small bowel   vascular compromise. 2. Small bowel containing left lower quadrant spigelian hernia. 3. Cecal containing direct right inguinal hernia.              Recent imaging reviewed    Problem List  Principal Problem:    SBO (small bowel obstruction) ([address])  Active Problems:    Abdominal pain    Small bowel obstruction (HCC)    Ventral incisional hernia with obstruction    Incarcerated right inguinal hernia  Resolved Problems:    * No resolved hospital problems. *       Assessment & Plan:   SBO:  PO day 4 from exploratory laparoscopy with JUANY, reduction of small bowel hernia, and repair of incarcerated left lateral abdominal wall hernia and repair of incarcerated right inguinal hernia. She is tolerating a diet. Ambulation was encouraged. Therapy is following   Hypoxia:  now resolved. Weaned off oxygen this am.  Sats are 93% on RA. Received one dose of IV lasix a few days ago. Hypokalemia: will replace orally   Dementia: symptoms worsened over the past year. Patient still drives and lives alone. on low dose seroquel at hs. She slept well. No need for sitter   Disposition:  pre cert pending for [facility]     Diet: ADULT DIET; Dysphagia - Soft and Bite Sized; Mildly Thick (Nectar);  No Drinking Straws  Code:Full Code  DVT PPX: lovenox  Disposition [facility]       BEAU Hernandez - CNP   8/1/2023 8:09 AM

## 2023-08-01 NOTE — PLAN OF CARE
Problem: Discharge Planning  Goal: Discharge to home or other facility with appropriate resources  8/1/2023 0959 by Diane Johnson RN  Outcome: Progressing  7/31/2023 2109 by Yessica Foster RN  Outcome: Progressing     Problem: Pain  Goal: Verbalizes/displays adequate comfort level or baseline comfort level  8/1/2023 0959 by Diane Johnson RN  Outcome: Progressing  7/31/2023 2109 by Yessica Foster RN  Outcome: Progressing     Problem: Hematologic - Adult  Goal: Maintains hematologic stability  8/1/2023 0959 by Diane Johnson RN  Outcome: Progressing  7/31/2023 2109 by Yessica Foster RN  Outcome: Progressing     Problem: Safety - Adult  Goal: Free from fall injury  8/1/2023 0959 by Diane Johnson RN  Outcome: Progressing  7/31/2023 2109 by Yessica Foster RN  Outcome: Progressing     Problem: Skin/Tissue Integrity  Goal: Absence of new skin breakdown  Description: 1. Monitor for areas of redness and/or skin breakdown  2. Assess vascular access sites hourly  3. Every 4-6 hours minimum:  Change oxygen saturation probe site  4. Every 4-6 hours:  If on nasal continuous positive airway pressure, respiratory therapy assess nares and determine need for appliance change or resting period.   Outcome: Progressing     Problem: ABCDS Injury Assessment  Goal: Absence of physical injury  Outcome: Progressing     Problem: Risk for Elopement  Goal: Patient will not exit the unit/facility without proper excort  Outcome: Progressing     Problem: Nutrition Deficit:  Goal: Optimize nutritional status  Outcome: Progressing

## 2023-08-01 NOTE — PLAN OF CARE
Problem: Discharge Planning  Goal: Discharge to home or other facility with appropriate resources  7/31/2023 2109 by Rosamaria Moe RN  Outcome: Progressing  7/31/2023 1021 by Marielle Moreno RN  Outcome: Progressing     Problem: Pain  Goal: Verbalizes/displays adequate comfort level or baseline comfort level  7/31/2023 2109 by Rosamaria Moe RN  Outcome: Progressing  7/31/2023 1021 by Marielle Moreno RN  Outcome: Progressing     Problem: Hematologic - Adult  Goal: Maintains hematologic stability  7/31/2023 2109 by Rosamaria Moe RN  Outcome: Progressing  7/31/2023 1021 by Marielle Moreno RN  Outcome: Progressing     Problem: Safety - Adult  Goal: Free from fall injury  7/31/2023 2109 by Rosamaria Moe RN  Outcome: Progressing  7/31/2023 1021 by Marielle Moreno RN  Outcome: Progressing     Problem: Skin/Tissue Integrity  Goal: Absence of new skin breakdown  Description: 1. Monitor for areas of redness and/or skin breakdown  2. Assess vascular access sites hourly  3. Every 4-6 hours minimum:  Change oxygen saturation probe site  4. Every 4-6 hours:  If on nasal continuous positive airway pressure, respiratory therapy assess nares and determine need for appliance change or resting period.   7/31/2023 1021 by Marielle Moreno RN  Outcome: Progressing     Problem: ABCDS Injury Assessment  Goal: Absence of physical injury  7/31/2023 1021 by Marielle Moreno RN  Outcome: Progressing     Problem: Risk for Elopement  Goal: Patient will not exit the unit/facility without proper excort  7/31/2023 1021 by Marielle Moreno RN  Outcome: Progressing

## 2023-08-02 VITALS
HEIGHT: 66 IN | RESPIRATION RATE: 18 BRPM | OXYGEN SATURATION: 92 % | HEART RATE: 75 BPM | BODY MASS INDEX: 16.71 KG/M2 | WEIGHT: 104 LBS | SYSTOLIC BLOOD PRESSURE: 133 MMHG | DIASTOLIC BLOOD PRESSURE: 75 MMHG | TEMPERATURE: 98.2 F

## 2023-08-02 LAB
ALBUMIN SERPL-MCNC: 3 G/DL (ref 3.4–5)
ANION GAP SERPL CALCULATED.3IONS-SCNC: 7 MMOL/L (ref 3–16)
BASOPHILS # BLD: 0.1 K/UL (ref 0–0.2)
BASOPHILS NFR BLD: 1 %
BUN SERPL-MCNC: 20 MG/DL (ref 7–20)
CALCIUM SERPL-MCNC: 10 MG/DL (ref 8.3–10.6)
CHLORIDE SERPL-SCNC: 107 MMOL/L (ref 99–110)
CO2 SERPL-SCNC: 27 MMOL/L (ref 21–32)
CREAT SERPL-MCNC: 0.5 MG/DL (ref 0.6–1.2)
DEPRECATED RDW RBC AUTO: 13.7 % (ref 12.4–15.4)
EOSINOPHIL # BLD: 0.3 K/UL (ref 0–0.6)
EOSINOPHIL NFR BLD: 4.7 %
GFR SERPLBLD CREATININE-BSD FMLA CKD-EPI: >60 ML/MIN/{1.73_M2}
GLUCOSE SERPL-MCNC: 119 MG/DL (ref 70–99)
HCT VFR BLD AUTO: 38.2 % (ref 36–48)
HGB BLD-MCNC: 12.5 G/DL (ref 12–16)
LYMPHOCYTES # BLD: 1 K/UL (ref 1–5.1)
LYMPHOCYTES NFR BLD: 16.5 %
MCH RBC QN AUTO: 29.6 PG (ref 26–34)
MCHC RBC AUTO-ENTMCNC: 32.7 G/DL (ref 31–36)
MCV RBC AUTO: 90.5 FL (ref 80–100)
MONOCYTES # BLD: 0.5 K/UL (ref 0–1.3)
MONOCYTES NFR BLD: 9.1 %
NEUTROPHILS # BLD: 4 K/UL (ref 1.7–7.7)
NEUTROPHILS NFR BLD: 68.7 %
PHOSPHATE SERPL-MCNC: 2.5 MG/DL (ref 2.5–4.9)
PLATELET # BLD AUTO: 159 K/UL (ref 135–450)
PMV BLD AUTO: 8.3 FL (ref 5–10.5)
POTASSIUM SERPL-SCNC: 3.9 MMOL/L (ref 3.5–5.1)
RBC # BLD AUTO: 4.22 M/UL (ref 4–5.2)
SODIUM SERPL-SCNC: 141 MMOL/L (ref 136–145)
WBC # BLD AUTO: 5.9 K/UL (ref 4–11)

## 2023-08-02 PROCEDURE — 6370000000 HC RX 637 (ALT 250 FOR IP): Performed by: NURSE PRACTITIONER

## 2023-08-02 PROCEDURE — 85025 COMPLETE CBC W/AUTO DIFF WBC: CPT

## 2023-08-02 PROCEDURE — 6360000002 HC RX W HCPCS: Performed by: SURGERY

## 2023-08-02 PROCEDURE — APPSS15 APP SPLIT SHARED TIME 0-15 MINUTES: Performed by: NURSE PRACTITIONER

## 2023-08-02 PROCEDURE — APPNB30 APP NON BILLABLE TIME 0-30 MINS: Performed by: NURSE PRACTITIONER

## 2023-08-02 PROCEDURE — 2580000003 HC RX 258: Performed by: SURGERY

## 2023-08-02 PROCEDURE — 6370000000 HC RX 637 (ALT 250 FOR IP): Performed by: SURGERY

## 2023-08-02 PROCEDURE — 80069 RENAL FUNCTION PANEL: CPT

## 2023-08-02 PROCEDURE — 36415 COLL VENOUS BLD VENIPUNCTURE: CPT

## 2023-08-02 RX ADMIN — ENOXAPARIN SODIUM 30 MG: 100 INJECTION SUBCUTANEOUS at 09:00

## 2023-08-02 RX ADMIN — PANTOPRAZOLE SODIUM 40 MG: 40 TABLET, DELAYED RELEASE ORAL at 05:35

## 2023-08-02 RX ADMIN — DOCUSATE SODIUM 100 MG: 100 CAPSULE, LIQUID FILLED ORAL at 09:01

## 2023-08-02 RX ADMIN — ACETAMINOPHEN 1000 MG: 500 TABLET ORAL at 05:35

## 2023-08-02 RX ADMIN — MEMANTINE 5 MG: 5 TABLET ORAL at 09:00

## 2023-08-02 RX ADMIN — Medication 10 ML: at 09:10

## 2023-08-02 NOTE — DISCHARGE INSTR - COC
Continuity of Care Form    Patient Name: Dennie Rail   :  1935  MRN:  [id number]    Admit date:  2023  Discharge date:  23    Code Status Order: Full Code   Advance Directives:   [address] Documentation       Date/Time Healthcare Directive Type of Healthcare Directive Copy in [address] Agent's Name Healthcare Agent's Phone Number    23 1324 No, patient does not have an advance directive for healthcare treatment -- -- -- -- --            Admitting Physician:  Jocelynn Sanders MD  PCP: Apoorva Sosa MD    Discharging Nurse: [facility] Unit/Room#: 1GA-9560/0816-72  Discharging Unit Phone Number: [phone]    Emergency Contact:   Extended Emergency Contact Information  Primary Emergency Contact: Daljit Ellington, [address] [facility] of [address] [place] Phone: [phone]  Mobile Phone: [phone]  Relation: Child  Secondary Emergency Contact: [address] Phone: [phone]  Mobile Phone: [phone]  Relation: Child    Past Surgical History:  Past Surgical History:   Procedure Laterality Date    APPENDECTOMY      BLADDER SURGERY      CHOLECYSTECTOMY, LAPAROSCOPIC N/A 2023    CHOLECYSTECTOMY LAPAROSCOPIC, CHOLANGIOGRAM performed by Reji Fleming MD at [address] [place] [address], VAGINAL  2016    vagianl hysterectomy, right salpingoophorectomy, anterior and posterior repair, paravaginal repair, uterosacral suspension, cystoscopy, transvaginal tape sling insertion    OTHER SURGICAL HISTORY  2017    SLING REMOVAL/RELEASE, AND [employer] FIT TRANSVAGINAL    RECTOCELE REPAIR      TONSILLECTOMY      UTERINE SUSPENSION      VENTRAL HERNIA REPAIR N/A 2023    DIAGNOSTIC LAPAROSCOPY, LYSIS OF ADHESIONS, REPAIR OF ABDOMINAL WALL HERNIA WITH  MESH performed by Reji Fleming MD at [address] [place] [address]

## 2023-08-02 NOTE — CARE COORDINATION
Discharge Plan:     Patient discharged to:    [facility], [address] [facility]    SW/DC Planner faxed, [address] and [address] to: [phone]    Narcotic Prescriptions faxed were: N/A    RN: will call report to: [address] [place] [address] with: [facility] [phone]     time: 3505    Family advised of discharge?: yes, Dimas Khan, daughter    BAYRON Submitted?:  yes    All discharge needs met per case management.     YASEMIN TerrellN RN    [facility]  Phone: [phone]

## 2023-08-02 NOTE — DISCHARGE INSTRUCTIONS
[facility]    Discharge Instructions      Activity  - activity as tolerated, no driving while on analgesics, and no heavy lifting for 6 weeks; it is OK to be up walking around--walking up and down stairs is also OK. Do what is comfortable: stop and rest if you feel tired. Diet  - regular diet  - Drink plenty of fluids     Pain  - You may use narcotic pain medication as prescribed for breakthrough pain  - You can use OTC Tylenol or Ibuprofen for 1-2 weeks (may need to adjust the dose as directed on the bottle if enteric coated ibuprofen chosen)  - Avoid taking narcotic pain medication on an empty stomach which may result in nausea, if pain medication is causing nausea try decreasing the dose  - Narcotic pain medication is not necessary, please contact the office if pain is not controlled  - Narcotic pain medication will not be refilled on weekends and after hours  - Please contact the office Monday-Friday 8a-4p if a refill is requested    Nausea  - Avoid taking narcotic pain medication on an empty stomach which may result in nausea  - If pain medication is causing nausea you may try decreasing the dose  - Nausea can be common for 24 hours after surgery--if nausea uncontrolled or worsening, contact the office or go to the ED    Constipation  - Pain medication can be constipating  - Often, it helps to take a stool softener with the goal of at least one soft bowel movement daily with minimal straining  - You may also need to increase water and fiber intake--may supplement with Benefiber and increasing your activity will also be helpful  - If a stool softener is needed, the following OTC medications are recommend: Colace 100 mg by mouth twice daily, Miralax 17 gm by mouth 1-3 times daily with glass of water, dulcolax suppositories, and Fleets enemas    Wound Care  - keep wound clean and dry  - If incisional bleeding is noted, hold firm pressure for 15-20 minutes.  If remains uncontrolled, either contact the office or present to nearest ED  - It is common to have bruising or mild redness around the wounds within the first few days after surgery. If it worsens, or starts draining, do not hesitate to contact the office  - May shower but no tub baths or swimming pools--do not submerge incisions under water    Cautions  - Watch for signs of infection, such as: fever over 100.5, excessive warmth or redness around incisions, bloody or cloudy drainage from incisions  - Watch for signs of complication: uncontrolled pain, nausea, bloating, sudden lightheadedness  - Serious problems can arise after surgery that need urgent or immediate care  - Do not hesitate to contact the office with any questions    Follow-up with your surgeon in  2 weeks. Call [phone] to schedule follow-up visit.

## 2023-08-02 NOTE — PLAN OF CARE
Problem: Discharge Planning  Goal: Discharge to home or other facility with appropriate resources  8/1/2023 2238 by Sakina Montgomery RN  Outcome: Progressing     Problem: Pain  Goal: Verbalizes/displays adequate comfort level or baseline comfort level  8/1/2023 2238 by Sakina Montgomery, RN  Outcome: Progressing

## 2023-08-02 NOTE — PLAN OF CARE
Problem: Discharge Planning  Goal: Discharge to home or other facility with appropriate resources  8/2/2023 0919 by Antony Ritter RN  Outcome: Progressing  8/1/2023 2238 by Saray Christian RN  Outcome: Progressing     Problem: Pain  Goal: Verbalizes/displays adequate comfort level or baseline comfort level  8/2/2023 0919 by Antony Ritter RN  Outcome: Progressing  8/1/2023 2238 by Saray Christian RN  Outcome: Progressing     Problem: Hematologic - Adult  Goal: Maintains hematologic stability  8/2/2023 0919 by Antony Rtiter RN  Outcome: Progressing  8/1/2023 2238 by Saray Christian RN  Outcome: Progressing     Problem: Safety - Adult  Goal: Free from fall injury  Outcome: Progressing     Problem: Skin/Tissue Integrity  Goal: Absence of new skin breakdown  Description: 1. Monitor for areas of redness and/or skin breakdown  2. Assess vascular access sites hourly  3. Every 4-6 hours minimum:  Change oxygen saturation probe site  4. Every 4-6 hours:  If on nasal continuous positive airway pressure, respiratory therapy assess nares and determine need for appliance change or resting period.   Outcome: Progressing     Problem: ABCDS Injury Assessment  Goal: Absence of physical injury  Outcome: Progressing     Problem: Risk for Elopement  Goal: Patient will not exit the unit/facility without proper excort  Outcome: Progressing     Problem: Nutrition Deficit:  Goal: Optimize nutritional status  Outcome: Progressing

## 2023-08-02 NOTE — PROGRESS NOTES
Shift assessment complete, VSS, pt A&Ox2, disoriented to time and place. Lap sites to abd CDI, c/o pain to abd 3/10. Scheduled med administered per [facility], POC and education reviewed with the pt. All needs met at this time, call light in reach, will continue to monitor. 1450: Called [place] and gave report to the nurse Jayme Wyatt, answered all questions.

## 2023-08-02 NOTE — PLAN OF CARE
Problem: Discharge Planning  Goal: Discharge to home or other facility with appropriate resources  8/2/2023 1451 by Hortensia Jesus RN  Outcome: Completed  8/2/2023 0919 by Hortensia Jesus RN  Outcome: Progressing     Problem: Pain  Goal: Verbalizes/displays adequate comfort level or baseline comfort level  8/2/2023 1451 by Hortensia Jesus RN  Outcome: Completed  8/2/2023 0919 by Hortensia Jesus RN  Outcome: Progressing     Problem: Hematologic - Adult  Goal: Maintains hematologic stability  8/2/2023 1451 by Hortensia Jesus RN  Outcome: Completed  8/2/2023 0919 by Hortensia Jesus RN  Outcome: Progressing     Problem: Safety - Adult  Goal: Free from fall injury  8/2/2023 1451 by Hortensia Jesus RN  Outcome: Completed  8/2/2023 0919 by Hortensia Jesus RN  Outcome: Progressing     Problem: Skin/Tissue Integrity  Goal: Absence of new skin breakdown  Description: 1. Monitor for areas of redness and/or skin breakdown  2. Assess vascular access sites hourly  3. Every 4-6 hours minimum:  Change oxygen saturation probe site  4. Every 4-6 hours:  If on nasal continuous positive airway pressure, respiratory therapy assess nares and determine need for appliance change or resting period.   8/2/2023 1451 by Hortensia Jesus RN  Outcome: Completed  8/2/2023 0919 by Hortensia Jesus RN  Outcome: Progressing     Problem: ABCDS Injury Assessment  Goal: Absence of physical injury  8/2/2023 1451 by Hortensia Jesus RN  Outcome: Completed  8/2/2023 0919 by Hortensia Jesus RN  Outcome: Progressing     Problem: Risk for Elopement  Goal: Patient will not exit the unit/facility without proper excort  8/2/2023 1451 by Hortensia Jesus RN  Outcome: Completed  8/2/2023 0919 by Hortensia Jesus RN  Outcome: Progressing     Problem: Nutrition Deficit:  Goal: Optimize nutritional status  8/2/2023 1451 by Hortensia Jesus RN  Outcome: Completed  8/2/2023 0919 by Hortensia Jesus RN  Outcome: Progressing

## 2023-08-02 NOTE — DISCHARGE SUMMARY
[address] [place] [address] DISCHARGE SUMMARY    Patient Demographics    Patient. Natalia Cannon  Date of Birth. 1935  MRN. [id number]     Primary care provider. Maximiliano Conteh MD  (Tel: [phone])    Admit date: 7/27/2023    Discharge date 8/2/2023  Note Date: 8/2/2023     Reason for Hospitalization. Chief Complaint   Patient presents with    Abdominal Pain     All over abd pain since this morning, reports hard \"ball like\" bowel movements. Denies any nausea or vomiting         Significant Findings. Principal Problem:    SBO (small bowel obstruction) (HCC)  Active Problems:    Abdominal pain    Small bowel obstruction (HCC)    Ventral incisional hernia with obstruction    Incarcerated right inguinal hernia  Resolved Problems:    * No resolved hospital problems. *       Problems and results from this hospitalization that need follow up. Follow up with PCP in 2 weeks     Significant test results and incidental findings. XR ABDOMEN (2 VIEWS)   Final Result   Decreased small bowel distention       Bilateral lower lobe consolidation and adjacent pleural effusions           XR CHEST PORTABLE   Final Result   Small pleural effusions and bibasilar airspace opacities that could represent   atelectasis or pneumonia. Airspace opacities are increased from the prior   study. XR ABDOMEN (2 VIEWS)   Final Result   1. NG tube in proper position within the stomach. 2. Bowel gas pattern remains consistent with a mild small bowel ileus or   obstruction. 3. No evidence of free air. XR ABDOMEN FOR NG/OG/NE TUBE PLACEMENT   Final Result   Enteric tube with tip and side-port in the proximal stomach           CT ABDOMEN PELVIS W IV CONTRAST Additional Contrast? None   Final Result   1. Abnormal small bowel distension with high suspicion of mid small bowel   vascular compromise.    2. Small bowel containing left

## 2023-08-03 ENCOUNTER — TELEPHONE (OUTPATIENT)
Dept: INTERNAL MEDICINE CLINIC | Age: 88
End: 2023-08-03

## 2023-08-03 NOTE — TELEPHONE ENCOUNTER
Care Transitions Initial Follow Up Call    Outreach made within 2 business days of discharge: Yes    Patient: Jamey Underwood Patient : 1935   MRN: [id number]  Reason for Admission: There are no discharge diagnoses documented for the most recent discharge. Discharge Date: 23       Spoke with: Dustin Khan    Discharge department/facility: [facility] Interactive Patient Contact:  Was patient able to fill all prescriptions: Yes  Was patient instructed to bring all medications to the follow-up visit: Yes  Is patient taking all medications as directed in the discharge summary? Yes  Does patient understand their discharge instructions: Yes  Does patient have questions or concerns that need addressed prior to 7-14 day follow up office visit:     Patient discharged to [facility] rehab facility. Daughter will call this office when patient is discharged to arrange for HFU visit.       Scheduled appointment with PCP within 7-14 days    Follow Up  Future Appointments   Date Time Provider [address]   2023  4:20 PM M Sidney Soulier, MD [facility] [place]   2024  4:00 PM MD Audra Altamirano LPN

## 2023-08-03 NOTE — TELEPHONE ENCOUNTER
Care Transitions Initial Follow Up Call    Outreach made within 2 business days of discharge:     Patient: Dennie Rail Patient : 1935   MRN: [id number]  Reason for Admission: There are no discharge diagnoses documented for the most recent discharge. Discharge Date: 23       Spoke with:    LVM for daughterNathalia Rater to confirm patient was transferred to a rehab facility post hospital stay as per hospital visit note,    Request made for Nathalia Rater to please call us back to confirm rehab stay and request for a HFU visit once patient is d/c'd home.        Scheduled appointment with PCP within 7-14 days    Follow Up  Future Appointments   Date Time Provider [address]   2023  4:20 PM SILVA Sandoval MD [facility] [place]   2024  4:00 PM MD Boby Gilmore LPN

## 2023-08-03 NOTE — TELEPHONE ENCOUNTER
Care Transitions Initial Follow Up Call    Outreach made within 2 business days of discharge: Yes    Patient: Ashley Amado Patient : 1935   MRN: [id number]  Reason for Admission: There are no discharge diagnoses documented for the most recent discharge. Discharge Date: 23       Spoke with:     Called patient to confirm she was transferred to a rehab facility post hospital stay as per hospital visit note - no answer, no option to leave VM.       Scheduled appointment with PCP within 7-14 days    Follow Up  Future Appointments   Date Time Provider [address] [place] [address]   2023  4:20 PM SILVA Guthrie MD [facility] [place]   2024  4:00 PM MD Gris Graves LPN

## 2023-08-07 NOTE — PROGRESS NOTES
Physician Progress Note      Román Fisher  CSN #:                  [id number]  :                       1935  ADMIT DATE:       2023 10:02 AM  DISCH DATE:        2023 4:30 PM  RESPONDING  PROVIDER #:        Brady Calloway MD          QUERY TEXT:    Patient admitted with SBO. Noted documentation of sepsis in . In order to   support the diagnosis of sepsis, please include additional clinical indicators   in your documentation, or please document if the diagnosis of sepsis has been   ruled out after further study. .. The medical record reflects the following:  Risk Factors: presented with SBO and elevated lactic acid  Clinical Indicators: Afebrile, VSS, Lactic Acid 2.3 on admit, WBC up to 13.4   on  after surgery. No infection documented. Treatment: IV Fluids, IV Zosyn  Options provided:  -- Sepsis present as evidenced by (please document), Please document evidence. -- Sepsis was ruled out after study  -- Other - I will add my own diagnosis  -- Disagree - Not applicable / Not valid  -- Disagree - Clinically unable to determine / Unknown  -- Refer to Clinical Documentation Reviewer    PROVIDER RESPONSE TEXT:    Sepsis was ruled out after study.     Query created by: Reba Mcrae on 2023 9:55 AM      Electronically signed by:  Brady Calloway MD 2023 10:01 AM

## 2023-09-11 ENCOUNTER — TELEPHONE (OUTPATIENT)
Dept: INTERNAL MEDICINE CLINIC | Age: 88
End: 2023-09-11

## 2023-09-11 NOTE — TELEPHONE ENCOUNTER
Abigail from [facility] calling ---pt out of rehab --need verbal orders for nursing-& PT and pt daughter asking for OT and speech ---please call Abigail at [phone]. Thanks.

## 2023-09-13 ENCOUNTER — OFFICE VISIT (OUTPATIENT)
Dept: INTERNAL MEDICINE CLINIC | Age: 88
End: 2023-09-13

## 2023-09-13 VITALS
BODY MASS INDEX: 16.33 KG/M2 | HEART RATE: 67 BPM | WEIGHT: 101.2 LBS | SYSTOLIC BLOOD PRESSURE: 128 MMHG | OXYGEN SATURATION: 97 % | DIASTOLIC BLOOD PRESSURE: 82 MMHG

## 2023-09-13 DIAGNOSIS — R53.1 GENERALIZED WEAKNESS: ICD-10-CM

## 2023-09-13 DIAGNOSIS — F03.90 DEMENTIA WITHOUT BEHAVIORAL DISTURBANCE, PSYCHOTIC DISTURBANCE, MOOD DISTURBANCE, OR ANXIETY, UNSPECIFIED DEMENTIA SEVERITY, UNSPECIFIED DEMENTIA TYPE (HCC): ICD-10-CM

## 2023-09-13 DIAGNOSIS — Z23 NEED FOR INFLUENZA VACCINATION: ICD-10-CM

## 2023-09-13 DIAGNOSIS — R41.89 COGNITIVE IMPAIRMENT: ICD-10-CM

## 2023-09-13 DIAGNOSIS — Z09 HOSPITAL DISCHARGE FOLLOW-UP: Primary | ICD-10-CM

## 2023-09-13 LAB
DEPRECATED RDW RBC AUTO: 14.8 % (ref 12.4–15.4)
HCT VFR BLD AUTO: 43.7 % (ref 36–48)
HGB BLD-MCNC: 14.3 G/DL (ref 12–16)
MCH RBC QN AUTO: 30.2 PG (ref 26–34)
MCHC RBC AUTO-ENTMCNC: 32.9 G/DL (ref 31–36)
MCV RBC AUTO: 91.9 FL (ref 80–100)
PLATELET # BLD AUTO: 176 K/UL (ref 135–450)
PMV BLD AUTO: 9.7 FL (ref 5–10.5)
RBC # BLD AUTO: 4.75 M/UL (ref 4–5.2)
WBC # BLD AUTO: 5 K/UL (ref 4–11)

## 2023-09-13 RX ORDER — MEMANTINE HYDROCHLORIDE 10 MG/1
10 TABLET ORAL 2 TIMES DAILY
Qty: 90 TABLET | Refills: 0 | Status: SHIPPED | OUTPATIENT
Start: 2023-09-13 | End: 2023-12-12

## 2023-09-13 NOTE — PROGRESS NOTES
daughter noticed patient symptoms gradually getting worse. Patient Active Problem List   Diagnosis    Chest pain    Coronary artery disease involving native coronary artery without angina pectoris    Mixed hyperlipidemia    Dizziness    Subjective tinnitus of both ears    Hearing loss of both ears    Bilateral sensorineural hearing loss    Palpitations    Bradycardia    Ulcer of left lower extremity, unspecified ulcer stage ([address])    Varicose veins of both lower extremities    Venous ulcer ([address])    Acalculous cholecystitis    Dementia ([address])    Acute cholecystitis    Acute metabolic encephalopathy    Acute respiratory failure with hypercapnia (HCC)    Altered mental status    Thrombocytopenia, unspecified    Abdominal pain    SBO (small bowel obstruction) ([address])    Small bowel obstruction ([address])    Ventral incisional hernia with obstruction    Incarcerated right inguinal hernia       Medications listed as ordered at the time of discharge from hospital     Medication List            Accurate as of September 13, 2023 12:59 PM. If you have any questions, ask your nurse or doctor.                 CHANGE how you take these medications      memantine 10 MG tablet  Commonly known as: NAMENDA  Take 1 tablet by mouth 2 times daily  What changed:   medication strength  how much to take  Changed by: Emilia Negron MD            CONTINUE taking these medications      acetaminophen 500 MG tablet  Commonly known as: TYLENOL  Take 1 tablet by mouth every 8 (eight) hours for 10 days     COQ10 PO     Fish Oil Extra Strength 1438 MG Caps     folic acid 053 MCG tablet  Commonly known as: FOLVITE     magnesium oxide 400 MG tablet  Commonly known as: MAG-OX     PreserVision AREDS 2+Multi Vit Caps     Vitamin D3 50 MCG (2000 UT) Caps               Where to Get Your Medications        These medications were sent to [facility] [address] [facility], [address] [place] [address]  [address] [place] [address],

## 2023-09-14 LAB
ANION GAP SERPL CALCULATED.3IONS-SCNC: 7 MMOL/L (ref 3–16)
BUN SERPL-MCNC: 26 MG/DL (ref 7–20)
CALCIUM SERPL-MCNC: 10.2 MG/DL (ref 8.3–10.6)
CHLORIDE SERPL-SCNC: 105 MMOL/L (ref 99–110)
CO2 SERPL-SCNC: 31 MMOL/L (ref 21–32)
CREAT SERPL-MCNC: 0.7 MG/DL (ref 0.6–1.2)
GFR SERPLBLD CREATININE-BSD FMLA CKD-EPI: >60 ML/MIN/{1.73_M2}
GLUCOSE SERPL-MCNC: 92 MG/DL (ref 70–99)
MAGNESIUM SERPL-MCNC: 2.4 MG/DL (ref 1.8–2.4)
POTASSIUM SERPL-SCNC: 4.4 MMOL/L (ref 3.5–5.1)
SODIUM SERPL-SCNC: 143 MMOL/L (ref 136–145)

## 2023-09-18 ENCOUNTER — TELEPHONE (OUTPATIENT)
Dept: INTERNAL MEDICINE CLINIC | Age: 88
End: 2023-09-18

## 2023-09-18 NOTE — TELEPHONE ENCOUNTER
Katie Benjamin from [facility] calling needs verbal order for speech therapy for pt very concerned about her swallowing and thought speech [facility] help---please call her at [phone]. Thanks.

## 2023-10-20 NOTE — ANESTHESIA POSTPROCEDURE EVALUATION
Department of Anesthesiology  Postprocedure Note    Patient: Nadir Faulkner  MRN: [id number]  YOB: 1935  Date of evaluation: 5/1/2023      Procedure Summary     Date: 05/01/23 Room / Location: [address]    Anesthesia Start: 7611 Anesthesia Stop: 1232    Procedure: CHOLECYSTECTOMY LAPAROSCOPIC, CHOLANGIOGRAM (Abdomen) Diagnosis:       Cholecystitis      (cholecystitis)    Surgeons: Mady Mathews MD Responsible Provider: Chitra Shultz MD    Anesthesia Type: general ASA Status: 3          Anesthesia Type: No value filed.     Rian Phase I: Rian Score: 6    Rian Phase II:        Anesthesia Post Evaluation    Patient location during evaluation: PACU  Patient participation: complete - patient participated  Level of consciousness: awake and alert  Airway patency: patent  Nausea & Vomiting: no nausea and no vomiting  Complications: no  Cardiovascular status: hemodynamically stable  Respiratory status: acceptable  Hydration status: stable  Multimodal analgesia pain management approach 147

## 2023-11-02 ENCOUNTER — OFFICE VISIT (OUTPATIENT)
Dept: INTERNAL MEDICINE CLINIC | Age: 88
End: 2023-11-02
Payer: MEDICARE

## 2023-11-02 VITALS
WEIGHT: 109 LBS | OXYGEN SATURATION: 99 % | HEART RATE: 86 BPM | BODY MASS INDEX: 17.59 KG/M2 | DIASTOLIC BLOOD PRESSURE: 78 MMHG | SYSTOLIC BLOOD PRESSURE: 138 MMHG

## 2023-11-02 DIAGNOSIS — R41.89 COGNITIVE IMPAIRMENT: ICD-10-CM

## 2023-11-02 DIAGNOSIS — R53.83 TIREDNESS: ICD-10-CM

## 2023-11-02 DIAGNOSIS — F03.90 DEMENTIA WITHOUT BEHAVIORAL DISTURBANCE, PSYCHOTIC DISTURBANCE, MOOD DISTURBANCE, OR ANXIETY, UNSPECIFIED DEMENTIA SEVERITY, UNSPECIFIED DEMENTIA TYPE (HCC): ICD-10-CM

## 2023-11-02 DIAGNOSIS — F32.A DEPRESSION, UNSPECIFIED DEPRESSION TYPE: ICD-10-CM

## 2023-11-02 DIAGNOSIS — N39.0 RECURRENT UTI: ICD-10-CM

## 2023-11-02 DIAGNOSIS — F03.90 DEMENTIA WITHOUT BEHAVIORAL DISTURBANCE, PSYCHOTIC DISTURBANCE, MOOD DISTURBANCE, OR ANXIETY, UNSPECIFIED DEMENTIA SEVERITY, UNSPECIFIED DEMENTIA TYPE (HCC): Primary | ICD-10-CM

## 2023-11-02 LAB
BACTERIA URNS QL MICRO: ABNORMAL /HPF
BASOPHILS # BLD: 0 K/UL (ref 0–0.2)
BASOPHILS NFR BLD: 0.5 %
BILIRUB UR QL STRIP.AUTO: NEGATIVE
CLARITY UR: CLEAR
COLOR UR: YELLOW
DEPRECATED RDW RBC AUTO: 14.4 % (ref 12.4–15.4)
EOSINOPHIL # BLD: 0.1 K/UL (ref 0–0.6)
EOSINOPHIL NFR BLD: 2.3 %
EPI CELLS #/AREA URNS AUTO: 3 /HPF (ref 0–5)
GLUCOSE UR STRIP.AUTO-MCNC: NEGATIVE MG/DL
HCT VFR BLD AUTO: 42.1 % (ref 36–48)
HGB BLD-MCNC: 14 G/DL (ref 12–16)
HGB UR QL STRIP.AUTO: NEGATIVE
HYALINE CASTS #/AREA URNS AUTO: 0 /LPF (ref 0–8)
KETONES UR STRIP.AUTO-MCNC: NEGATIVE MG/DL
LEUKOCYTE ESTERASE UR QL STRIP.AUTO: ABNORMAL
LYMPHOCYTES # BLD: 1.5 K/UL (ref 1–5.1)
LYMPHOCYTES NFR BLD: 28.9 %
MCH RBC QN AUTO: 29.7 PG (ref 26–34)
MCHC RBC AUTO-ENTMCNC: 33.4 G/DL (ref 31–36)
MCV RBC AUTO: 89 FL (ref 80–100)
MONOCYTES # BLD: 0.4 K/UL (ref 0–1.3)
MONOCYTES NFR BLD: 8.1 %
NEUTROPHILS # BLD: 3.1 K/UL (ref 1.7–7.7)
NEUTROPHILS NFR BLD: 60.2 %
NITRITE UR QL STRIP.AUTO: POSITIVE
PH UR STRIP.AUTO: 5.5 [PH] (ref 5–8)
PLATELET # BLD AUTO: 154 K/UL (ref 135–450)
PMV BLD AUTO: 9.4 FL (ref 5–10.5)
PROT UR STRIP.AUTO-MCNC: NEGATIVE MG/DL
RBC # BLD AUTO: 4.73 M/UL (ref 4–5.2)
RBC CLUMPS #/AREA URNS AUTO: 3 /HPF (ref 0–4)
SP GR UR STRIP.AUTO: 1.02 (ref 1–1.03)
UA COMPLETE W REFLEX CULTURE PNL UR: YES
UA DIPSTICK W REFLEX MICRO PNL UR: YES
URN SPEC COLLECT METH UR: ABNORMAL
UROBILINOGEN UR STRIP-ACNC: 0.2 E.U./DL
WBC # BLD AUTO: 5.1 K/UL (ref 4–11)
WBC #/AREA URNS AUTO: 13 /HPF (ref 0–5)

## 2023-11-02 PROCEDURE — 1123F ACP DISCUSS/DSCN MKR DOCD: CPT | Performed by: INTERNAL MEDICINE

## 2023-11-02 PROCEDURE — 99214 OFFICE O/P EST MOD 30 MIN: CPT | Performed by: INTERNAL MEDICINE

## 2023-11-02 RX ORDER — CITALOPRAM HYDROBROMIDE 10 MG/1
10 TABLET ORAL DAILY
Qty: 30 TABLET | Refills: 3 | Status: SHIPPED | OUTPATIENT
Start: 2023-11-02

## 2023-11-02 ASSESSMENT — ENCOUNTER SYMPTOMS
ABDOMINAL PAIN: 0
TROUBLE SWALLOWING: 0
PHOTOPHOBIA: 0
NAUSEA: 0
VOICE CHANGE: 0
VOMITING: 0
SHORTNESS OF BREATH: 0
WHEEZING: 0

## 2023-11-02 NOTE — PROGRESS NOTES
the patient. Documentation was done using voice recognition dragon software. Every effort was made to ensure accuracy; however, inadvertent  Unintentional computerized transcription errors may be present.

## 2023-11-03 DIAGNOSIS — N39.0 URINARY TRACT INFECTION WITHOUT HEMATURIA, SITE UNSPECIFIED: Primary | ICD-10-CM

## 2023-11-03 LAB
ANION GAP SERPL CALCULATED.3IONS-SCNC: 7 MMOL/L (ref 3–16)
BUN SERPL-MCNC: 38 MG/DL (ref 7–20)
CALCIUM SERPL-MCNC: 9.8 MG/DL (ref 8.3–10.6)
CHLORIDE SERPL-SCNC: 105 MMOL/L (ref 99–110)
CO2 SERPL-SCNC: 30 MMOL/L (ref 21–32)
CREAT SERPL-MCNC: 0.7 MG/DL (ref 0.6–1.2)
GFR SERPLBLD CREATININE-BSD FMLA CKD-EPI: >60 ML/MIN/{1.73_M2}
GLUCOSE SERPL-MCNC: 84 MG/DL (ref 70–99)
POTASSIUM SERPL-SCNC: 4.4 MMOL/L (ref 3.5–5.1)
SODIUM SERPL-SCNC: 142 MMOL/L (ref 136–145)
TSH SERPL DL<=0.005 MIU/L-ACNC: 2.74 UIU/ML (ref 0.27–4.2)

## 2023-11-03 RX ORDER — CEFUROXIME AXETIL 250 MG/1
250 TABLET ORAL 2 TIMES DAILY
Qty: 20 TABLET | Refills: 0 | Status: SHIPPED | OUTPATIENT
Start: 2023-11-03 | End: 2023-11-13

## 2023-11-03 NOTE — RESULT ENCOUNTER NOTE
Patient has urinary tract infection. Ceftin is sent to the pharmacy. BUN slightly elevated.   Encourage hydration

## 2023-11-04 LAB
BACTERIA UR CULT: ABNORMAL
BACTERIA UR CULT: ABNORMAL
ORGANISM: ABNORMAL
ORGANISM: ABNORMAL

## 2023-11-07 ENCOUNTER — CARE COORDINATION (OUTPATIENT)
Dept: CARE COORDINATION | Age: 88
End: 2023-11-07

## 2023-11-07 NOTE — CARE COORDINATION
Pcp referral to care coordination; worsening dementia. RN-CC attempted outreach. No answer; phone just rang. RN-CC will follow up at later date & time.  Attempt #1

## 2023-11-09 ENCOUNTER — CARE COORDINATION (OUTPATIENT)
Dept: CARE COORDINATION | Age: 88
End: 2023-11-09

## 2023-11-09 NOTE — CARE COORDINATION
Pcp referral to care coordination; worsening dementia. RN-CC attempted outreach to patients daughter, Allen Izaguirre. No answer; HIPPA compliant message left through  requesting call return. RN-CC will follow up at later date & time.  Attempt #2

## 2023-11-16 ENCOUNTER — CARE COORDINATION (OUTPATIENT)
Dept: CARE COORDINATION | Age: 88
End: 2023-11-16

## 2023-11-16 SDOH — ECONOMIC STABILITY: INCOME INSECURITY: IN THE LAST 12 MONTHS, WAS THERE A TIME WHEN YOU WERE NOT ABLE TO PAY THE MORTGAGE OR RENT ON TIME?: NO

## 2023-11-16 SDOH — ECONOMIC STABILITY: TRANSPORTATION INSECURITY
IN THE PAST 12 MONTHS, HAS THE LACK OF TRANSPORTATION KEPT YOU FROM MEDICAL APPOINTMENTS OR FROM GETTING MEDICATIONS?: NO

## 2023-11-16 SDOH — ECONOMIC STABILITY: TRANSPORTATION INSECURITY
IN THE PAST 12 MONTHS, HAS LACK OF TRANSPORTATION KEPT YOU FROM MEETINGS, WORK, OR FROM GETTING THINGS NEEDED FOR DAILY LIVING?: NO

## 2023-11-16 SDOH — ECONOMIC STABILITY: HOUSING INSECURITY: IN THE LAST 12 MONTHS, HOW MANY PLACES HAVE YOU LIVED?: 1

## 2023-11-16 ASSESSMENT — SOCIAL DETERMINANTS OF HEALTH (SDOH)
IN A TYPICAL WEEK, HOW MANY TIMES DO YOU TALK ON THE PHONE WITH FAMILY, FRIENDS, OR NEIGHBORS?: ONCE A WEEK
HOW OFTEN DO YOU GET TOGETHER WITH FRIENDS OR RELATIVES?: ONCE A WEEK
DO YOU BELONG TO ANY CLUBS OR ORGANIZATIONS SUCH AS CHURCH GROUPS UNIONS, FRATERNAL OR ATHLETIC GROUPS, OR SCHOOL GROUPS?: NO
HOW OFTEN DO YOU ATTEND CHURCH OR RELIGIOUS SERVICES?: NEVER
HOW OFTEN DO YOU ATTENT MEETINGS OF THE CLUB OR ORGANIZATION YOU BELONG TO?: NEVER

## 2023-11-16 NOTE — CARE COORDINATION
Ambulatory Care Coordination Note  2023    Patient Current Location: [address] [place] [address] contacted the family by telephone. Verified name and  with family as identifiers. Provided introduction to self, and explanation of the ACM role. ACM: Esperanza Fischer RN    Challenges to be reviewed by the provider   Additional needs identified to be addressed with provider: No  none               Method of communication with provider: none. Pcp referral to care coordination; worsening dementia. RN-CC outreached patient daughter and Rachel Rota. RN-CC introduces self and role of care coordinator. Patient is 81 yr old female with pmhx of CAD and dementia. Patient lives alone in a single family home. Her daughter Cruz lives 5 minutes from her and his her primary caregiver. Cruz assists her mother with medical transportation, medical management and finances. Medina Nobles still drives short distances around [facility]. Cruz states she tried to take her mothers car but she returned it after she threatened to call the police. Cruz works full time and cares for children with medical needs. Cruz is requesting caregiver resources for laundry, housekeeping and companionship. Cruz states her mother has been resistant to in home assistance in the past and is hoping she will be more receptive to it this time. We discussed activities available at the [facility] but Cruz states her mother would not be interested. Private pay is not an option due to limited income. halfway and/or memory care facility may be in consideration. RN-CC referred Cruz to [facility] to answer any questions she may have about transitioning to a facility. RN-CC discussed a referral to [facility] for home health aide services, meals and adult day services. Cruz is aware a representative from [facility] will outreach her to complete a telephonic assessment to determine eligibility.  RN-CC informed Cruz that [facility] will attempt to contact her 1-2 times, then

## 2023-11-21 ENCOUNTER — CARE COORDINATION (OUTPATIENT)
Dept: CARE COORDINATION | Age: 88
End: 2023-11-21

## 2023-11-21 NOTE — CARE COORDINATION
RN-CC attempted to outreach patients daughter, Latasha Mendes, for cc follow up; [facility] referral, [facility] referral, [employer], needs & concerns. No answer; HIPPA compliant message left through  requesting call return. RN-CC will follow up at later date & time.

## 2023-11-24 ENCOUNTER — HOSPITAL ENCOUNTER (INPATIENT)
Age: 88
LOS: 2 days | Discharge: HOME HEALTH | DRG: 690 | End: 2023-11-26
Attending: INTERNAL MEDICINE | Admitting: INTERNAL MEDICINE
Payer: MEDICARE

## 2023-11-24 DIAGNOSIS — R41.82 ALTERED MENTAL STATUS, UNSPECIFIED ALTERED MENTAL STATUS TYPE: ICD-10-CM

## 2023-11-24 DIAGNOSIS — N30.00 ACUTE CYSTITIS WITHOUT HEMATURIA: Primary | ICD-10-CM

## 2023-11-24 PROBLEM — N39.0 UTI (URINARY TRACT INFECTION): Status: ACTIVE | Noted: 2023-11-24

## 2023-11-24 LAB
ANION GAP SERPL CALCULATED.3IONS-SCNC: 6 MMOL/L (ref 3–16)
BACTERIA URNS QL MICRO: ABNORMAL /HPF
BASOPHILS # BLD: 0 K/UL (ref 0–0.2)
BASOPHILS NFR BLD: 0.5 %
BILIRUB UR QL STRIP.AUTO: NEGATIVE
BUN SERPL-MCNC: 27 MG/DL (ref 7–20)
CALCIUM SERPL-MCNC: 9.6 MG/DL (ref 8.3–10.6)
CHLORIDE SERPL-SCNC: 107 MMOL/L (ref 99–110)
CLARITY UR: ABNORMAL
CO2 SERPL-SCNC: 29 MMOL/L (ref 21–32)
COLOR UR: YELLOW
CREAT SERPL-MCNC: 0.6 MG/DL (ref 0.6–1.2)
DEPRECATED RDW RBC AUTO: 15 % (ref 12.4–15.4)
EOSINOPHIL # BLD: 0.1 K/UL (ref 0–0.6)
EOSINOPHIL NFR BLD: 1.1 %
EPI CELLS #/AREA URNS AUTO: 2 /HPF (ref 0–5)
GFR SERPLBLD CREATININE-BSD FMLA CKD-EPI: >60 ML/MIN/{1.73_M2}
GLUCOSE SERPL-MCNC: 96 MG/DL (ref 70–99)
GLUCOSE UR STRIP.AUTO-MCNC: NEGATIVE MG/DL
HCT VFR BLD AUTO: 43.1 % (ref 36–48)
HGB BLD-MCNC: 14 G/DL (ref 12–16)
HGB UR QL STRIP.AUTO: ABNORMAL
HYALINE CASTS #/AREA URNS AUTO: 2 /LPF (ref 0–8)
KETONES UR STRIP.AUTO-MCNC: NEGATIVE MG/DL
LACTATE BLDV-SCNC: 1 MMOL/L (ref 0.4–1.9)
LEUKOCYTE ESTERASE UR QL STRIP.AUTO: ABNORMAL
LYMPHOCYTES # BLD: 1.2 K/UL (ref 1–5.1)
LYMPHOCYTES NFR BLD: 21.9 %
MCH RBC QN AUTO: 29 PG (ref 26–34)
MCHC RBC AUTO-ENTMCNC: 32.4 G/DL (ref 31–36)
MCV RBC AUTO: 89.6 FL (ref 80–100)
MONOCYTES # BLD: 0.2 K/UL (ref 0–1.3)
MONOCYTES NFR BLD: 4.4 %
NEUTROPHILS # BLD: 3.9 K/UL (ref 1.7–7.7)
NEUTROPHILS NFR BLD: 72.1 %
NITRITE UR QL STRIP.AUTO: NEGATIVE
PH UR STRIP.AUTO: 5.5 [PH] (ref 5–8)
PLATELET # BLD AUTO: 162 K/UL (ref 135–450)
PMV BLD AUTO: 8.6 FL (ref 5–10.5)
POTASSIUM SERPL-SCNC: 4.9 MMOL/L (ref 3.5–5.1)
PROT UR STRIP.AUTO-MCNC: 100 MG/DL
RBC # BLD AUTO: 4.81 M/UL (ref 4–5.2)
RBC CLUMPS #/AREA URNS AUTO: 18 /HPF (ref 0–4)
SODIUM SERPL-SCNC: 142 MMOL/L (ref 136–145)
SP GR UR STRIP.AUTO: 1.02 (ref 1–1.03)
UA COMPLETE W REFLEX CULTURE PNL UR: YES
UA DIPSTICK W REFLEX MICRO PNL UR: YES
URN SPEC COLLECT METH UR: ABNORMAL
UROBILINOGEN UR STRIP-ACNC: 1 E.U./DL
WBC # BLD AUTO: 5.4 K/UL (ref 4–11)
WBC #/AREA URNS AUTO: 1805 /HPF (ref 0–5)

## 2023-11-24 PROCEDURE — 6360000002 HC RX W HCPCS: Performed by: PHYSICIAN ASSISTANT

## 2023-11-24 PROCEDURE — 2580000003 HC RX 258: Performed by: PHYSICIAN ASSISTANT

## 2023-11-24 PROCEDURE — 83605 ASSAY OF LACTIC ACID: CPT

## 2023-11-24 PROCEDURE — 87077 CULTURE AEROBIC IDENTIFY: CPT

## 2023-11-24 PROCEDURE — 80048 BASIC METABOLIC PNL TOTAL CA: CPT

## 2023-11-24 PROCEDURE — 36415 COLL VENOUS BLD VENIPUNCTURE: CPT

## 2023-11-24 PROCEDURE — 87086 URINE CULTURE/COLONY COUNT: CPT

## 2023-11-24 PROCEDURE — 2580000003 HC RX 258: Performed by: INTERNAL MEDICINE

## 2023-11-24 PROCEDURE — 96365 THER/PROPH/DIAG IV INF INIT: CPT

## 2023-11-24 PROCEDURE — 99285 EMERGENCY DEPT VISIT HI MDM: CPT

## 2023-11-24 PROCEDURE — 1200000000 HC SEMI PRIVATE

## 2023-11-24 PROCEDURE — 81001 URINALYSIS AUTO W/SCOPE: CPT

## 2023-11-24 PROCEDURE — 85025 COMPLETE CBC W/AUTO DIFF WBC: CPT

## 2023-11-24 PROCEDURE — 87186 SC STD MICRODIL/AGAR DIL: CPT

## 2023-11-24 RX ORDER — ACETAMINOPHEN 650 MG/1
650 SUPPOSITORY RECTAL EVERY 6 HOURS PRN
Status: DISCONTINUED | OUTPATIENT
Start: 2023-11-24 | End: 2023-11-26 | Stop reason: HOSPADM

## 2023-11-24 RX ORDER — ONDANSETRON 2 MG/ML
4 INJECTION INTRAMUSCULAR; INTRAVENOUS EVERY 6 HOURS PRN
Status: DISCONTINUED | OUTPATIENT
Start: 2023-11-24 | End: 2023-11-26 | Stop reason: HOSPADM

## 2023-11-24 RX ORDER — VITAMIN B COMPLEX
2000 TABLET ORAL DAILY
Status: DISCONTINUED | OUTPATIENT
Start: 2023-11-25 | End: 2023-11-26 | Stop reason: HOSPADM

## 2023-11-24 RX ORDER — LANOLIN ALCOHOL/MO/W.PET/CERES
400 CREAM (GRAM) TOPICAL DAILY
Status: DISCONTINUED | OUTPATIENT
Start: 2023-11-25 | End: 2023-11-26 | Stop reason: HOSPADM

## 2023-11-24 RX ORDER — POTASSIUM CHLORIDE 20 MEQ/1
40 TABLET, EXTENDED RELEASE ORAL PRN
Status: DISCONTINUED | OUTPATIENT
Start: 2023-11-24 | End: 2023-11-26 | Stop reason: HOSPADM

## 2023-11-24 RX ORDER — SODIUM CHLORIDE 0.9 % (FLUSH) 0.9 %
5-40 SYRINGE (ML) INJECTION PRN
Status: DISCONTINUED | OUTPATIENT
Start: 2023-11-24 | End: 2023-11-26 | Stop reason: HOSPADM

## 2023-11-24 RX ORDER — FOLIC ACID 1 MG/1
500 TABLET ORAL EVERY OTHER DAY
Status: DISCONTINUED | OUTPATIENT
Start: 2023-11-25 | End: 2023-11-26 | Stop reason: HOSPADM

## 2023-11-24 RX ORDER — MEMANTINE HYDROCHLORIDE 5 MG/1
10 TABLET ORAL 2 TIMES DAILY
Status: DISCONTINUED | OUTPATIENT
Start: 2023-11-24 | End: 2023-11-26 | Stop reason: HOSPADM

## 2023-11-24 RX ORDER — MAGNESIUM SULFATE IN WATER 40 MG/ML
2000 INJECTION, SOLUTION INTRAVENOUS PRN
Status: DISCONTINUED | OUTPATIENT
Start: 2023-11-24 | End: 2023-11-26 | Stop reason: HOSPADM

## 2023-11-24 RX ORDER — ONDANSETRON 4 MG/1
4 TABLET, ORALLY DISINTEGRATING ORAL EVERY 8 HOURS PRN
Status: DISCONTINUED | OUTPATIENT
Start: 2023-11-24 | End: 2023-11-26 | Stop reason: HOSPADM

## 2023-11-24 RX ORDER — SODIUM CHLORIDE 9 MG/ML
INJECTION, SOLUTION INTRAVENOUS CONTINUOUS
Status: ACTIVE | OUTPATIENT
Start: 2023-11-24 | End: 2023-11-26

## 2023-11-24 RX ORDER — ACETAMINOPHEN 325 MG/1
650 TABLET ORAL EVERY 6 HOURS PRN
Status: DISCONTINUED | OUTPATIENT
Start: 2023-11-24 | End: 2023-11-26 | Stop reason: HOSPADM

## 2023-11-24 RX ORDER — SODIUM CHLORIDE 0.9 % (FLUSH) 0.9 %
5-40 SYRINGE (ML) INJECTION EVERY 12 HOURS SCHEDULED
Status: DISCONTINUED | OUTPATIENT
Start: 2023-11-24 | End: 2023-11-26 | Stop reason: HOSPADM

## 2023-11-24 RX ORDER — ENOXAPARIN SODIUM 100 MG/ML
40 INJECTION SUBCUTANEOUS DAILY
Status: DISCONTINUED | OUTPATIENT
Start: 2023-11-25 | End: 2023-11-26 | Stop reason: HOSPADM

## 2023-11-24 RX ORDER — POLYETHYLENE GLYCOL 3350 17 G/17G
17 POWDER, FOR SOLUTION ORAL DAILY PRN
Status: DISCONTINUED | OUTPATIENT
Start: 2023-11-24 | End: 2023-11-26 | Stop reason: HOSPADM

## 2023-11-24 RX ORDER — POTASSIUM CHLORIDE 7.45 MG/ML
10 INJECTION INTRAVENOUS PRN
Status: DISCONTINUED | OUTPATIENT
Start: 2023-11-24 | End: 2023-11-26 | Stop reason: HOSPADM

## 2023-11-24 RX ORDER — SODIUM CHLORIDE 9 MG/ML
INJECTION, SOLUTION INTRAVENOUS PRN
Status: DISCONTINUED | OUTPATIENT
Start: 2023-11-24 | End: 2023-11-26 | Stop reason: HOSPADM

## 2023-11-24 RX ORDER — CITALOPRAM 20 MG/1
10 TABLET ORAL DAILY
Status: DISCONTINUED | OUTPATIENT
Start: 2023-11-25 | End: 2023-11-26 | Stop reason: HOSPADM

## 2023-11-24 RX ORDER — MULTIVITAMIN WITH IRON
1 TABLET ORAL DAILY
Status: DISCONTINUED | OUTPATIENT
Start: 2023-11-25 | End: 2023-11-26 | Stop reason: HOSPADM

## 2023-11-24 RX ADMIN — SODIUM CHLORIDE: 9 INJECTION, SOLUTION INTRAVENOUS at 17:10

## 2023-11-24 RX ADMIN — SODIUM CHLORIDE: 9 INJECTION, SOLUTION INTRAVENOUS at 21:40

## 2023-11-24 RX ADMIN — CEFTRIAXONE SODIUM 1000 MG: 1 INJECTION, POWDER, FOR SOLUTION INTRAMUSCULAR; INTRAVENOUS at 14:34

## 2023-11-24 RX ADMIN — SODIUM CHLORIDE, PRESERVATIVE FREE 10 ML: 5 INJECTION INTRAVENOUS at 21:41

## 2023-11-24 ASSESSMENT — ENCOUNTER SYMPTOMS
RHINORRHEA: 0
SHORTNESS OF BREATH: 0
COUGH: 0
DIARRHEA: 0
NAUSEA: 0
VOMITING: 0
ABDOMINAL PAIN: 0
WHEEZING: 0

## 2023-11-24 NOTE — ED PROVIDER NOTES
[facility]        Pt Name: Scotty Mendez  MRN: [id number]  [address] [facility] 1935  Date of evaluation: 11/24/2023  Provider: Oksana Wright PA-C  PCP: Mars Kolb MD  Note Started: 12:51 PM EST 11/24/23      DIANA. I have evaluated this patient. CHIEF COMPLAINT       Chief Complaint   Patient presents with    Urinary Tract Infection     Pt reports urinary frequency and burning x2 days. HISTORY OF PRESENT ILLNESS: 1 or more Elements     History From: patient, daughter   Limitations to history : None    Scotty Mendez is a 81 y.o. female who presents for evaluation of urinary frequency, dysuria and urgency x 2 days. No hematuria. No abdominal pain nausea vomiting or diarrhea. No fevers or chills. Concern for urinary tract infection. She has had similar symptoms in the past.  No recent antibiotics. She has no other complaints or concerns at this time. Nursing Notes were all reviewed and agreed with or any disagreements were addressed in the HPI. REVIEW OF SYSTEMS :      Review of Systems   Constitutional:  Negative for appetite change, chills and fever. HENT:  Negative for congestion and rhinorrhea. Respiratory:  Negative for cough, shortness of breath and wheezing. Cardiovascular:  Negative for chest pain. Gastrointestinal:  Negative for abdominal pain, diarrhea, nausea and vomiting. Genitourinary:  Positive for dysuria, frequency and urgency. Negative for difficulty urinating and hematuria. Musculoskeletal:  Negative for neck pain and neck stiffness. Skin:  Negative for rash. Neurological:  Negative for headaches. Positives and Pertinent negatives as per HPI.      SURGICAL HISTORY     Past Surgical History:   Procedure Laterality Date    APPENDECTOMY      BLADDER SURGERY      CHOLECYSTECTOMY, LAPAROSCOPIC N/A 5/1/2023    CHOLECYSTECTOMY LAPAROSCOPIC, CHOLANGIOGRAM performed by [facility] Tan

## 2023-11-24 NOTE — ACP (ADVANCE CARE PLANNING)
Advanced Care Planning Note. Purpose of Encounter: Advanced care planning in light of dementia  Parties In Attendance: Patient  Decisional Capacity: Limited  Subjective: Patient with lower abdominal pain  Objective: Cr 0.6  Goals of Care Determination: Patient wants full support (CPR, vent, surgery, HD, trach, PEG)  Plan:  IVF, IV Abx, PT/OT  Code Status: Full code  Time spent on Advanced care Plannin minutes  Advanced Care Planning Documents: Completed advanced directives on chart, daughter is the POA.     Karlie Arreguin MD  2023 3:42 PM

## 2023-11-24 NOTE — H&P
HOSPITALISTS HISTORY AND PHYSICAL    11/24/2023 3:41 PM    Patient Information:  Denny Allison is a 81 y.o. female [id number]  PCP:  Rhiannon Matias MD (Tel: [phone] )    Chief complaint:    Chief Complaint   Patient presents with    Urinary Tract Infection     Pt reports urinary frequency and burning x2 days. History of Present Illness:  Denny Allison is a 81 y.o. female with dementia, CAD, HTN who came to ER with complaints of dysuria. Patient states symptoms present for 2 days. Patient is frustrated that she got sick. Some lower abdominal fullness. No CP, SOB, HA or fevers. No nausea, vomiting, diarrhea or melena. Apparently lives alone and drove here. Otherwise complete ROS is negative unless listed above. REVIEW OF SYSTEMS:   Pertinent positives as noted in HPI. All other systems were reviewed and are negative. Past Medical History:   has a past medical history of Anemia, Angina at rest, Anxiety, Back pain, CAD (coronary artery disease), Clostridium difficile diarrhea, Heart attack ([address]), History of uterine suspension procedure, Hyperlipidemia, Hypertension, Pain, and Urinary incontinence. Past Surgical History:   has a past surgical history that includes Uterine Suspension; Colonoscopy; Appendectomy; Tonsillectomy; Hysterectomy, vaginal (11/17/2016); Rectocele repair; Cystocele repair; Cystocopy; other surgical history (04/06/2017); Bladder surgery; Wrist fracture surgery (Left); Cholecystectomy, laparoscopic (N/A, 5/1/2023); and ventral hernia repair (N/A, 7/28/2023). Medications:  No current facility-administered medications on file prior to encounter.      Current Outpatient Medications on File Prior to Encounter   Medication Sig Dispense Refill    citalopram (CELEXA) 10 MG tablet Take 1 tablet by mouth daily 30 tablet 3    memantine (NAMENDA) 10 MG tablet Take 1 tablet

## 2023-11-24 NOTE — ED NOTES
ED TO INPATIENT SBAR HANDOFF    Patient Name: Luciana Riggs   :  1935  81 y.o. MRN:  [id number]  Preferred Name  [facility]  ED Room #:  ED-0014/14  Family/Caregiver Present no   Restraints no   Sitter no   Sepsis Risk Score Sepsis Risk Score: 0.79    Situation  Code Status: Full code . Allergies: Codeine, Hydrocodone, Atorvastatin, Captopril, Ciprofloxacin, Detrol [tolterodine tartrate], Donepezil, Ibuprofen, Statins, Sulfa antibiotics, Tolectin [tolmetin], Zestril [lisinopril], and Metoprolol  Weight: Patient Vitals for the past 96 hrs (Last 3 readings):   Weight   23 1243 49.9 kg (110 lb)     Arrived from: home  Chief Complaint:   Chief Complaint   Patient presents with    Urinary Tract Infection     Pt reports urinary frequency and burning x2 days. Hospital Problem/Diagnosis:  Principal Problem:    UTI (urinary tract infection)  Active Problems:    Coronary artery disease involving native coronary artery without angina pectoris    Dementia (HCC)    Depression  Resolved Problems:    * No resolved hospital problems.  *    Imaging:   No orders to display     Abnormal labs:   Abnormal Labs Reviewed   URINALYSIS WITH REFLEX TO CULTURE - Abnormal; Notable for the following components:       Result Value    Clarity, UA TURBID (*)     Blood, Urine MODERATE (*)     Protein,  (*)     Leukocyte Esterase, Urine LARGE (*)     All other components within normal limits   BASIC METABOLIC PANEL - Abnormal; Notable for the following components:    BUN 27 (*)     All other components within normal limits   MICROSCOPIC URINALYSIS - Abnormal; Notable for the following components:    Bacteria, UA 2+ (*)     WBC, UA 1805 (*)     RBC, UA 18 (*)     All other components within normal limits     Critical values:      Abnormal Assessment Findings:     Background  History:   Past Medical History:   Diagnosis Date    Anemia     Angina at rest     Anxiety     Back pain     CAD (coronary artery disease)     Clostridium

## 2023-11-25 LAB
ANION GAP SERPL CALCULATED.3IONS-SCNC: 10 MMOL/L (ref 3–16)
BASOPHILS # BLD: 0 K/UL (ref 0–0.2)
BASOPHILS NFR BLD: 0.5 %
BUN SERPL-MCNC: 28 MG/DL (ref 7–20)
CALCIUM SERPL-MCNC: 8.3 MG/DL (ref 8.3–10.6)
CHLORIDE SERPL-SCNC: 106 MMOL/L (ref 99–110)
CO2 SERPL-SCNC: 22 MMOL/L (ref 21–32)
CREAT SERPL-MCNC: 0.6 MG/DL (ref 0.6–1.2)
DEPRECATED RDW RBC AUTO: 14.9 % (ref 12.4–15.4)
EOSINOPHIL # BLD: 0.1 K/UL (ref 0–0.6)
EOSINOPHIL NFR BLD: 3.1 %
GFR SERPLBLD CREATININE-BSD FMLA CKD-EPI: >60 ML/MIN/{1.73_M2}
GLUCOSE SERPL-MCNC: 155 MG/DL (ref 70–99)
HCT VFR BLD AUTO: 37.9 % (ref 36–48)
HGB BLD-MCNC: 12.3 G/DL (ref 12–16)
LYMPHOCYTES # BLD: 1.3 K/UL (ref 1–5.1)
LYMPHOCYTES NFR BLD: 27.9 %
MCH RBC QN AUTO: 29.3 PG (ref 26–34)
MCHC RBC AUTO-ENTMCNC: 32.5 G/DL (ref 31–36)
MCV RBC AUTO: 90.2 FL (ref 80–100)
MONOCYTES # BLD: 0.3 K/UL (ref 0–1.3)
MONOCYTES NFR BLD: 6.6 %
NEUTROPHILS # BLD: 3 K/UL (ref 1.7–7.7)
NEUTROPHILS NFR BLD: 61.9 %
PLATELET # BLD AUTO: 132 K/UL (ref 135–450)
PMV BLD AUTO: 8.9 FL (ref 5–10.5)
POTASSIUM SERPL-SCNC: 3.6 MMOL/L (ref 3.5–5.1)
RBC # BLD AUTO: 4.2 M/UL (ref 4–5.2)
SODIUM SERPL-SCNC: 138 MMOL/L (ref 136–145)
WBC # BLD AUTO: 4.8 K/UL (ref 4–11)

## 2023-11-25 PROCEDURE — 2580000003 HC RX 258: Performed by: INTERNAL MEDICINE

## 2023-11-25 PROCEDURE — 80048 BASIC METABOLIC PNL TOTAL CA: CPT

## 2023-11-25 PROCEDURE — 6370000000 HC RX 637 (ALT 250 FOR IP): Performed by: INTERNAL MEDICINE

## 2023-11-25 PROCEDURE — 85025 COMPLETE CBC W/AUTO DIFF WBC: CPT

## 2023-11-25 PROCEDURE — 1200000000 HC SEMI PRIVATE

## 2023-11-25 PROCEDURE — 6360000002 HC RX W HCPCS: Performed by: INTERNAL MEDICINE

## 2023-11-25 PROCEDURE — 36415 COLL VENOUS BLD VENIPUNCTURE: CPT

## 2023-11-25 RX ADMIN — THERA TABS 1 TABLET: TAB at 08:33

## 2023-11-25 RX ADMIN — CEFTRIAXONE SODIUM 1000 MG: 1 INJECTION, POWDER, FOR SOLUTION INTRAMUSCULAR; INTRAVENOUS at 15:29

## 2023-11-25 RX ADMIN — MEMANTINE 10 MG: 5 TABLET ORAL at 20:55

## 2023-11-25 RX ADMIN — SODIUM CHLORIDE: 9 INJECTION, SOLUTION INTRAVENOUS at 07:53

## 2023-11-25 RX ADMIN — POLYETHYLENE GLYCOL 3350 17 G: 17 POWDER, FOR SOLUTION ORAL at 15:36

## 2023-11-25 RX ADMIN — FOLIC ACID 500 MCG: 1 TABLET ORAL at 08:24

## 2023-11-25 RX ADMIN — Medication 2000 UNITS: at 08:23

## 2023-11-25 RX ADMIN — SODIUM CHLORIDE: 9 INJECTION, SOLUTION INTRAVENOUS at 21:21

## 2023-11-25 RX ADMIN — MEMANTINE 10 MG: 5 TABLET ORAL at 08:23

## 2023-11-25 RX ADMIN — CITALOPRAM HYDROBROMIDE 10 MG: 20 TABLET ORAL at 08:24

## 2023-11-25 RX ADMIN — ENOXAPARIN SODIUM 40 MG: 100 INJECTION SUBCUTANEOUS at 08:22

## 2023-11-25 RX ADMIN — Medication 400 MG: at 08:24

## 2023-11-26 VITALS
HEART RATE: 65 BPM | TEMPERATURE: 97.7 F | OXYGEN SATURATION: 95 % | SYSTOLIC BLOOD PRESSURE: 133 MMHG | DIASTOLIC BLOOD PRESSURE: 74 MMHG | WEIGHT: 110 LBS | BODY MASS INDEX: 18.78 KG/M2 | HEIGHT: 64 IN | RESPIRATION RATE: 17 BRPM

## 2023-11-26 LAB
ANION GAP SERPL CALCULATED.3IONS-SCNC: 8 MMOL/L (ref 3–16)
BACTERIA UR CULT: ABNORMAL
BASOPHILS # BLD: 0 K/UL (ref 0–0.2)
BASOPHILS NFR BLD: 0.6 %
BUN SERPL-MCNC: 22 MG/DL (ref 7–20)
CALCIUM SERPL-MCNC: 9 MG/DL (ref 8.3–10.6)
CHLORIDE SERPL-SCNC: 104 MMOL/L (ref 99–110)
CO2 SERPL-SCNC: 26 MMOL/L (ref 21–32)
CREAT SERPL-MCNC: <0.5 MG/DL (ref 0.6–1.2)
DEPRECATED RDW RBC AUTO: 14.8 % (ref 12.4–15.4)
EOSINOPHIL # BLD: 0.2 K/UL (ref 0–0.6)
EOSINOPHIL NFR BLD: 4 %
GFR SERPLBLD CREATININE-BSD FMLA CKD-EPI: >60 ML/MIN/{1.73_M2}
GLUCOSE SERPL-MCNC: 112 MG/DL (ref 70–99)
HCT VFR BLD AUTO: 40 % (ref 36–48)
HGB BLD-MCNC: 13 G/DL (ref 12–16)
LYMPHOCYTES # BLD: 1.1 K/UL (ref 1–5.1)
LYMPHOCYTES NFR BLD: 25.6 %
MCH RBC QN AUTO: 29.3 PG (ref 26–34)
MCHC RBC AUTO-ENTMCNC: 32.6 G/DL (ref 31–36)
MCV RBC AUTO: 89.8 FL (ref 80–100)
MONOCYTES # BLD: 0.4 K/UL (ref 0–1.3)
MONOCYTES NFR BLD: 8.1 %
NEUTROPHILS # BLD: 2.8 K/UL (ref 1.7–7.7)
NEUTROPHILS NFR BLD: 61.7 %
ORGANISM: ABNORMAL
PLATELET # BLD AUTO: 137 K/UL (ref 135–450)
PMV BLD AUTO: 9.2 FL (ref 5–10.5)
POTASSIUM SERPL-SCNC: 4.4 MMOL/L (ref 3.5–5.1)
RBC # BLD AUTO: 4.45 M/UL (ref 4–5.2)
SODIUM SERPL-SCNC: 138 MMOL/L (ref 136–145)
WBC # BLD AUTO: 4.5 K/UL (ref 4–11)

## 2023-11-26 PROCEDURE — 97116 GAIT TRAINING THERAPY: CPT

## 2023-11-26 PROCEDURE — 97161 PT EVAL LOW COMPLEX 20 MIN: CPT

## 2023-11-26 PROCEDURE — 97530 THERAPEUTIC ACTIVITIES: CPT

## 2023-11-26 PROCEDURE — 97535 SELF CARE MNGMENT TRAINING: CPT

## 2023-11-26 PROCEDURE — 6360000002 HC RX W HCPCS: Performed by: INTERNAL MEDICINE

## 2023-11-26 PROCEDURE — 85025 COMPLETE CBC W/AUTO DIFF WBC: CPT

## 2023-11-26 PROCEDURE — 2580000003 HC RX 258: Performed by: INTERNAL MEDICINE

## 2023-11-26 PROCEDURE — 6370000000 HC RX 637 (ALT 250 FOR IP): Performed by: INTERNAL MEDICINE

## 2023-11-26 PROCEDURE — 80048 BASIC METABOLIC PNL TOTAL CA: CPT

## 2023-11-26 PROCEDURE — 36415 COLL VENOUS BLD VENIPUNCTURE: CPT

## 2023-11-26 PROCEDURE — 97165 OT EVAL LOW COMPLEX 30 MIN: CPT

## 2023-11-26 RX ORDER — CEPHALEXIN 500 MG/1
500 CAPSULE ORAL 3 TIMES DAILY
Qty: 6 CAPSULE | Refills: 0 | Status: SHIPPED | OUTPATIENT
Start: 2023-11-26 | End: 2023-11-28

## 2023-11-26 RX ADMIN — ENOXAPARIN SODIUM 40 MG: 100 INJECTION SUBCUTANEOUS at 09:29

## 2023-11-26 RX ADMIN — THERA TABS 1 TABLET: TAB at 09:31

## 2023-11-26 RX ADMIN — SODIUM CHLORIDE: 9 INJECTION, SOLUTION INTRAVENOUS at 12:45

## 2023-11-26 RX ADMIN — Medication 2000 UNITS: at 09:30

## 2023-11-26 RX ADMIN — Medication 400 MG: at 09:30

## 2023-11-26 RX ADMIN — MEMANTINE 10 MG: 5 TABLET ORAL at 09:32

## 2023-11-26 RX ADMIN — ACETAMINOPHEN 650 MG: 325 TABLET ORAL at 03:55

## 2023-11-26 RX ADMIN — CITALOPRAM HYDROBROMIDE 10 MG: 20 TABLET ORAL at 09:31

## 2023-11-26 RX ADMIN — CEFTRIAXONE SODIUM 1000 MG: 1 INJECTION, POWDER, FOR SOLUTION INTRAMUSCULAR; INTRAVENOUS at 15:44

## 2023-11-26 NOTE — DISCHARGE INSTR - COC
Continuity of Care Form    Patient Name: Elvira Porras   :  1935  MRN:  [id number]    Admit date:  2023  Discharge date:  ***    Code Status Order: Full Code   Advance Directives:     Admitting Physician:  Bobbye Lefort, MD  PCP: Maria L Higuera MD    Discharging Nurse: [facility] Unit/Room#: 9SF-7261/4581-57  Discharging Unit Phone Number: ***    Emergency Contact:   Extended Emergency Contact Information  Primary Emergency Contact: Fonda, [address] [place] [address] [place] Phone: [phone]  Mobile Phone: [phone]  Relation: Child  Secondary Emergency Contact: [address] Phone: [phone]  Mobile Phone: [phone]  Relation: Child    Past Surgical History:  Past Surgical History:   Procedure Laterality Date    APPENDECTOMY      BLADDER SURGERY      CHOLECYSTECTOMY, LAPAROSCOPIC N/A 2023    CHOLECYSTECTOMY LAPAROSCOPIC, CHOLANGIOGRAM performed by Martha Cheung MD at [address] [place] [address], VAGINAL  2016    vagianl hysterectomy, right salpingoophorectomy, anterior and posterior repair, paravaginal repair, uterosacral suspension, cystoscopy, transvaginal tape sling insertion    OTHER SURGICAL HISTORY  2017    SLING REMOVAL/RELEASE, AND [employer]AGE FIT TRANSVAGINAL    RECTOCELE REPAIR      TONSILLECTOMY      UTERINE SUSPENSION      VENTRAL HERNIA REPAIR N/A 2023    DIAGNOSTIC LAPAROSCOPY, LYSIS OF ADHESIONS, REPAIR OF ABDOMINAL WALL HERNIA WITH  MESH performed by Martha Cheung MD at [address] [place] [address]        Immunization History:   Immunization History   Administered Date(s) Administered    COVID-19, MODERNA BLUE border, Primary or Immunocompromised, (age 12y+), IM, 100 mcg/0.5mL 2021, 2021, 2021    COVID-19, PFIZER Bivalent, DO NOT Dilute, (age 12y+), IM, 30 mcg/0.3 mL 10/19/2022    Influenza Vaccine,

## 2023-11-26 NOTE — CARE COORDINATION
Case Management -  Discharge Note      Patient Name: Herlinda Youngblood                   YOB: 1935            Readmission Risk (Low < 19, Mod (19-27), High > 27): Readmission Risk Score: 12.3    Current PCP: Agustin Pittman MD    (IMM) Important Message from Medicare:    Date: N/A IMM was given when Patient came into the hospital.    PT AM-PAC:   /24  OT AM-PAC:   /24    Home Care Information:   Is patient resuming current home health care services: No    [facility]:   [facility] [address] [facility], 1000 [facility]  Phone: [phone]  Fax: [phone]              Services: PT/OT/Nursing  Home Health Order Obtained: Yes    Home health agency notified of discharge. Financial    Payor: Agustin Tam / Plan: [address] [place] [address] HMO / Product Type: *No Product type* /     Pharmacy:  Potential assistance Purchasing Medications:    Meds-to-Beds request:        [facility], [address] [place] [address] [phone] [facility] [phone]  [address] [place] [address]  Phone: [phone] Fax: [phone]      Notes: Additional Case Management Notes: NP Jagdeep Davies called  and advised that she spoke with Patient's daughter- Senia Cummings who advised they have used [facility] [address] in the past and would like to use them again. Senia Cummings also advised she would be by after dinner to  her mom. CM called Crystal with [facility] [phone] and [facility] advising she has a new referral and that she would fax it over and also call the main line. CM called the main [facility] like [phone] and LVM asking for a phone call back. CM faxed the Patient's face sheet and [address] orders to their fax of [phone]. The fax went through successfully. Since Pt is D/C today JADEN left a note for   Kelly Gonzalez to please follow up on the referral tomorrow.          Electronically signed by GRACE Marino on 11/26/2023

## 2023-11-26 NOTE — DISCHARGE SUMMARY
as: KEFLEX  Take 1 capsule by mouth 3 times daily for 2 days            CONTINUE taking these medications      acetaminophen 500 MG tablet  Commonly known as: TYLENOL  Take 1 tablet by mouth every 8 (eight) hours for 10 days     citalopram 10 MG tablet  Commonly known as: CeleXA  Take 1 tablet by mouth daily     COQ10 PO     Fish Oil Extra Strength 3702 MG Caps     folic acid 263 MCG tablet  Commonly known as: FOLVITE     magnesium oxide 400 MG tablet  Commonly known as: MAG-OX     memantine 10 MG tablet  Commonly known as: NAMENDA  Take 1 tablet by mouth 2 times daily     PreserVision AREDS 2+Multi Vit Caps     Vitamin D3 50 MCG (2000 UT) Caps               Where to Get Your Medications        These medications were sent to [address], [address] [place] [address]      Phone: [phone]   cephALEXin 500 MG capsule         Discharge recommendations given to patient. Follow Up. in 1 week   Disposition. home  Activity. activity as tolerated  Diet: ADULT DIET; Regular      Spent 35 minutes in discharge process.     Signed:  BEAU Lynn CNP     11/26/2023 12:29 PM

## 2023-11-27 ENCOUNTER — CARE COORDINATION (OUTPATIENT)
Dept: CASE MANAGEMENT | Age: 88
End: 2023-11-27

## 2023-11-27 NOTE — CARE COORDINATION
Care Transitions Outreach Attempt    Call within 2 business days of discharge: Yes     Attempted to reach patient for transitions of care follow up. Unable to reach patient. HC verified. Patient: Ashley Men Patient : 1935 MRN: [id number]    Last Discharge Facility       Date Complaint Diagnosis Description Type Department Provider    23 Urinary Tract Infection Acute cystitis without hematuria . .. ED to Hosp-Admission (Discharged) (ADMITTED) [facility] 4T Lilliana Diaz MD; Claudia Guillory. .. Was this an external facility discharge?  No Discharge Facility Name:     Noted following upcoming appointments from discharge chart review:   [facility] follow up appointment(s):   Future Appointments   Date Time Provider [address]   2/15/2024  4:00 PM Didier Pittman MD [facility]     Non-[facility]  follow up appointment(s):

## 2023-11-28 ENCOUNTER — TELEPHONE (OUTPATIENT)
Dept: INTERNAL MEDICINE CLINIC | Age: 88
End: 2023-11-28

## 2023-11-28 ENCOUNTER — CARE COORDINATION (OUTPATIENT)
Dept: CASE MANAGEMENT | Age: 88
End: 2023-11-28

## 2023-11-28 NOTE — TELEPHONE ENCOUNTER
2nd call made to patient to schedule HFU visit. No answer and no option to leave a VM. Daughter, Eryn Ashraf called. LVM for daughter to please call the office to assist in scheduling patient and to complete TCM requirements.

## 2023-11-28 NOTE — TELEPHONE ENCOUNTER
[place] from [facility] calling---need verbal orders for nursing-PT and OT---pt released from [facility] 11/26---Meaghan can be reached at [phone]---Thanks.

## 2023-11-28 NOTE — CARE COORDINATION
Care Transitions Outreach Attempt    Call within 2 business days of discharge: Yes     Attempted to reach patient for transitions of care follow up. Unable to reach patient x 2. Will reach out to PCP office regarding a f/u appt and will transition to the ACM. Patient: Holly Smith Patient : 1935 MRN: [id number]    Last Discharge Facility       Date Complaint Diagnosis Description Type Department Provider    23 Urinary Tract Infection Acute cystitis without hematuria . .. ED to Hosp-Admission (Discharged) (ADMITTED) [facility] 4T Rebecca Hou MD; Erika Johnson. .. Was this an external facility discharge?  No Discharge Facility Name:     Noted following upcoming appointments from discharge chart review:   [facility] follow up appointment(s):   Future Appointments   Date Time Provider [address] [place] [address]   2/15/2024  4:00 PM Asha Pittman MD [facility]     Non-[facility]  follow up appointment(s):

## 2023-11-28 NOTE — TELEPHONE ENCOUNTER
Care Transitions Initial Follow Up Call    Outreach made within 2 business days of discharge: Yes    Patient: Ashley Amado Patient : 1935   MRN: [id number]  Reason for Admission: There are no discharge diagnoses documented for the most recent discharge. Discharge Date: 23       Call patient - no answer and no option to leave VM.      Scheduled appointment with PCP within 7-14 days    Follow Up  Future Appointments   Date Time Provider [address] [place] [address]   2/15/2024  4:00 PM Didier Pittman MD [address] [place] [address]       James Hurst LPN

## 2023-11-29 NOTE — TELEPHONE ENCOUNTER
Care Transitions Initial Follow Up Call    Outreach made within 2 business days of discharge: Yes    Patient: Deysi Hou Patient : 1935   MRN: [id number]  Reason for Admission: There are no discharge diagnoses documented for the most recent discharge. Discharge Date: 23       Spoke with: Marlen Schmitz (daughter)    Discharge department/facility: [facility]    TCM Interactive Patient Contact:  Was patient able to fill all prescriptions: Yes  Was patient instructed to bring all medications to the follow-up visit: Yes  Is patient taking all medications as directed in the discharge summary?  Yes  Does patient understand their discharge instructions: Yes  Does patient have questions or concerns that need addressed prior to 7-14 day follow up office visit: no    Scheduled appointment with PCP within 7-14 days    Follow Up  Future Appointments   Date Time Provider [address] [place] [address]   2023  3:40 PM Hannah Pittman MD [facility] [place] - [facility]   2/15/2024  4:00 PM Hannah Pittman MD [address] [place] [address]

## 2023-11-30 ENCOUNTER — CARE COORDINATION (OUTPATIENT)
Dept: CARE COORDINATION | Age: 88
End: 2023-11-30

## 2023-11-30 NOTE — CARE COORDINATION
Ambulatory Care Coordination Note  2023    Patient Current Location: [address] [place] [address] contacted the family by telephone. Verified name and  with family as identifiers. Provided introduction to self, and explanation of the ACM role. Challenges to be reviewed by the provider   Additional needs identified to be addressed with provider: Yes  Discuss Urology referral               Method of communication with provider: chart routing. ACM: Esperanza Fischer, RN    RN-CC outreached patients daughter, Juancarlos Blake. Patient hospitalized - at [facility] for UTI. Dc'd to home with [facility] SN/PT/OT. Rukhsana Ocampo is driving at the time of my call so our conversation was brief. No new or worsening urinary sx reported. Patient has completed tx for UTI. Rukhsana Ocampo reports her mother has an extensive hx of recurring UTI that dates back to her youth. At one point she was established with a Urologist but Rukhsana Ocampo isn't sure if she is still active with Urology. Patient is scheduled for hospital follow up with Urology on 23. Rn-cc will outreach pcp to discuss Urology referral.     Rukhsana Ocampo states she has not connected with [employer], [facility] or [facility] regarding memory and caregiver resources. Rukhsana Ocampo acknowledges someone did contact her but she has been busy and has not had time to return their calls. RN-CC sent Rukhsana Ocampo the contact information for all 3 agencies and encouraged her to contact them at her convenience. No questions or concerns voiced at this time. PLAN:    Medication review   Urinary sx - urology referral?  [facility], [facility], [facility] [employer]  Needs & concerns      Offered patient enrollment in the Remote Patient Monitoring (RPM) program for in-home monitoring:  impaired memory .     Lab Results       None            Care Coordination Interventions    Referral from Primary Care Provider: Yes  Suggested Interventions and Community Resources  Medi Set or Pill Pack: Completed (Comment: medi set

## 2023-12-01 ENCOUNTER — TELEPHONE (OUTPATIENT)
Dept: INTERNAL MEDICINE CLINIC | Age: 88
End: 2023-12-01

## 2023-12-01 DIAGNOSIS — N39.0 RECURRENT URINARY TRACT INFECTION: Primary | ICD-10-CM

## 2023-12-05 NOTE — TELEPHONE ENCOUNTER
Slot held for patient tomorrow @ 4255 no longer held d/t daughter did not get back to this office before end of day. Slot given to another patient.

## 2023-12-05 NOTE — TELEPHONE ENCOUNTER
LVM for daughter Maria L to please return call. Patient also needs her HFU visit re-scheduled, appt had to be canceled on 12/1 d/t provider was ill. Slot for tomorrow 12/6 @ 1140 is being held for patient. Daughter was asked to please call us back right away should that slot work for patient and if not - she may be seen by another provider in the office.

## 2023-12-13 ENCOUNTER — CARE COORDINATION (OUTPATIENT)
Dept: CARE COORDINATION | Age: 88
End: 2023-12-13

## 2023-12-13 NOTE — CARE COORDINATION
Ambulatory Care Coordination Note  2023    Patient Current Location: [address] [place] [address] contacted the family by telephone. Verified name and  with family as identifiers. Provided introduction to self, and explanation of the ACM role. Challenges to be reviewed by the provider   Additional needs identified to be addressed with provider: No  none               Method of communication with provider: none. ACM: Esperanza Fischer, RN    RN-CC outreached patients daughter, Fredi Rodriguez,  for cc follow up; [facility], [facility], [facility] [employer], urology referral. Shell Orozco is at work at the time of my call. Shell Orozco states her mother is doing better. Family came in town over the weekend which caused increased confusion but she is now doing better. Shell Orozco reports difficulty communicating with the [facility] [address] agency. Patient was referred to [facility]; SN/PT/OT but has not established with them. Shell Orozco has been in contact with the intake dept but has had difficulty connecting with the clinical staff to schedule appointments. Shell Orozco states she works t/o the day and misses their calls. She states she plans on calling [facility] today to schedule start of care; confirmed she has their contact information. RN-CC offered to assist but Shell Orozco declined d/t potential for scheduling conflict. RN-CC encouraged Shell Orozco to call me if she has any more issues with scheduling. Shell Orozco has not connected with [facility] or [facility] [employer]; RN-CC confirmed she has their contact information and will follow up when she has time. She states she had a lengthy conversation with Cliff Mathew w/[facility]. She states their conversation was very productive and Cliff Mathew gave her a lot of useful information. Shell Orozco states she is in the process of enrolling her mother into M[facility] and adult day care services. Hospital f/u rescheduled for tomorrow     Urology referral placed and contact information sent to Shell Orozco via [address] [place] [address].      Plan:    Med review  [facility] evaluation  [facility]/Care

## 2023-12-15 ENCOUNTER — OFFICE VISIT (OUTPATIENT)
Dept: INTERNAL MEDICINE CLINIC | Age: 88
End: 2023-12-15

## 2023-12-15 VITALS
HEART RATE: 64 BPM | SYSTOLIC BLOOD PRESSURE: 134 MMHG | HEIGHT: 64 IN | OXYGEN SATURATION: 97 % | DIASTOLIC BLOOD PRESSURE: 82 MMHG | WEIGHT: 112.2 LBS | BODY MASS INDEX: 19.15 KG/M2

## 2023-12-15 DIAGNOSIS — N30.90 CYSTITIS: ICD-10-CM

## 2023-12-15 DIAGNOSIS — Z09 HOSPITAL DISCHARGE FOLLOW-UP: Primary | ICD-10-CM

## 2023-12-15 LAB
ANION GAP SERPL CALCULATED.3IONS-SCNC: 9 MMOL/L (ref 3–16)
BASOPHILS # BLD: 0.2 K/UL (ref 0–0.2)
BASOPHILS NFR BLD: 3.6 %
BILIRUBIN, POC: NORMAL
BLOOD URINE, POC: NORMAL
BUN SERPL-MCNC: 26 MG/DL (ref 7–20)
CALCIUM SERPL-MCNC: 9.8 MG/DL (ref 8.3–10.6)
CHLORIDE SERPL-SCNC: 104 MMOL/L (ref 99–110)
CLARITY, POC: NORMAL
CO2 SERPL-SCNC: 27 MMOL/L (ref 21–32)
COLOR, POC: YELLOW
CREAT SERPL-MCNC: 0.7 MG/DL (ref 0.6–1.2)
DEPRECATED RDW RBC AUTO: 14.5 % (ref 12.4–15.4)
EOSINOPHIL # BLD: 0.1 K/UL (ref 0–0.6)
EOSINOPHIL NFR BLD: 3.2 %
GFR SERPLBLD CREATININE-BSD FMLA CKD-EPI: >60 ML/MIN/{1.73_M2}
GLUCOSE SERPL-MCNC: 85 MG/DL (ref 70–99)
GLUCOSE URINE, POC: NORMAL
HCT VFR BLD AUTO: 42.8 % (ref 36–48)
HGB BLD-MCNC: 14.3 G/DL (ref 12–16)
KETONES, POC: NORMAL
LEUKOCYTE EST, POC: NORMAL
LYMPHOCYTES # BLD: 1.1 K/UL (ref 1–5.1)
LYMPHOCYTES NFR BLD: 26.2 %
MCH RBC QN AUTO: 29.5 PG (ref 26–34)
MCHC RBC AUTO-ENTMCNC: 33.4 G/DL (ref 31–36)
MCV RBC AUTO: 88.5 FL (ref 80–100)
MONOCYTES # BLD: 0.4 K/UL (ref 0–1.3)
MONOCYTES NFR BLD: 8.3 %
NEUTROPHILS # BLD: 2.6 K/UL (ref 1.7–7.7)
NEUTROPHILS NFR BLD: 58.7 %
NITRITE, POC: POSITIVE
PH, POC: 7
PLATELET # BLD AUTO: 152 K/UL (ref 135–450)
PMV BLD AUTO: 9.2 FL (ref 5–10.5)
POTASSIUM SERPL-SCNC: 4.5 MMOL/L (ref 3.5–5.1)
PROTEIN, POC: NORMAL
RBC # BLD AUTO: 4.84 M/UL (ref 4–5.2)
SODIUM SERPL-SCNC: 140 MMOL/L (ref 136–145)
SPECIFIC GRAVITY, POC: 1.02
UROBILINOGEN, POC: 0.2
WBC # BLD AUTO: 4.4 K/UL (ref 4–11)

## 2023-12-15 RX ORDER — CEPHALEXIN 500 MG/1
500 CAPSULE ORAL 4 TIMES DAILY
Qty: 28 CAPSULE | Refills: 0 | Status: SHIPPED | OUTPATIENT
Start: 2023-12-15 | End: 2023-12-22

## 2023-12-15 NOTE — PROGRESS NOTES
Post-Discharge Transitional Care Follow Up      Leidy Atwood   YOB: 1935    Date of Office Visit:  12/15/2023  Date of Hospital Admission: 11/24/23  Date of Hospital Discharge: 11/26/23  Readmission Risk Score (high >=14%. Medium >=10%):Readmission Risk Score: 12.3      Care management risk score Rising risk (score 2-5) and Complex Care (Scores >=6): No Risk Score On File     Non face to face  following discharge, date last encounter closed (first attempt may have been earlier): *No documented post hospital discharge outreach found in the last 14 days     Call initiated 2 business days of discharge: *No response recorded in the last 14 days     Hospital discharge follow-up  Assessment & Plan:  Hospital chart including notes, labs reviewed in detail. Admitted 11/24/23 - 11/26/23 with UTI. Orders:  -     [place] DISCHARGE MEDS RECONCILED W/ CURRENT OUTPATIENT MED LIST  Cystitis  Assessment & Plan:  Long-standing history of recurrent UTIs. Recently hospitalized with IV antibiotics and oral antibiotics at discharge. Patient with change in behavior past few days per daughter, urinalysis repeated in office. Positive for leukocytes and nitrites. Treat infection based upon past culture. Antibiotic choices limited due to medication allergies. Sending urine for culture. Start cephalexin 500 mg 4 times daily for 7 days. CBC, BMP ordered. Schedule appointment with urology. Orders:  -     POCT Urinalysis no Micro  -     NV DISCHARGE MEDS RECONCILED W/ CURRENT OUTPATIENT MED LIST  -     CBC with Auto Differential; Future  -     Basic Metabolic Panel; Future  -     cephALEXin (KEFLEX) 500 MG capsule; Take 1 capsule by mouth 4 times daily for 7 days, Disp-28 capsule, R-0Normal  -     Culture, Urine      Medical Decision Making: moderate complexity  Return if symptoms worsen or fail to improve. Subjective:   HPI    Inpatient course: Discharge summary reviewed- see chart.     Admitted 11/24/23 -

## 2023-12-15 NOTE — ASSESSMENT & PLAN NOTE
Long-standing history of recurrent UTIs. Recently hospitalized with IV antibiotics and oral antibiotics at discharge. Patient with change in behavior past few days per daughter, urinalysis repeated in office. Positive for leukocytes and nitrites. Treat infection based upon past culture. Antibiotic choices limited due to medication allergies. Sending urine for culture. Start cephalexin 500 mg 4 times daily for 7 days. CBC, BMP ordered. Schedule appointment with urology.

## 2023-12-17 LAB
BACTERIA UR CULT: ABNORMAL
BACTERIA UR CULT: ABNORMAL
ORGANISM: ABNORMAL

## 2023-12-24 PROBLEM — N39.0 UTI (URINARY TRACT INFECTION): Status: RESOLVED | Noted: 2023-11-24 | Resolved: 2023-12-24

## 2024-01-04 ENCOUNTER — CARE COORDINATION (OUTPATIENT)
Dept: CARE COORDINATION | Age: 89
End: 2024-01-04

## 2024-01-04 NOTE — CARE COORDINATION
RN-CC attempted to outreach patients daughter, Maria L Forte; Med review, C evaluation, [facility]/[employer]/[facility]/Adult Day Care, Urology referral. No answer; HIPPA compliant message left through  requesting call return. RN-CC will follow up at later date & time.

## 2024-01-12 ENCOUNTER — CARE COORDINATION (OUTPATIENT)
Dept: CARE COORDINATION | Age: 89
End: 2024-01-12

## 2024-01-12 NOTE — CARE COORDINATION
RN-CC attempted to outreach patients daughter, Maria L Forte; Med review, [facility] evaluation, [facility] [employer]/[facility]/Adult Day Care, Urology referral. No answer; HIPPA compliant message left through  requesting call return. RN-CC will follow up at later date & time. Attempt #2

## 2024-01-14 PROBLEM — Z09 HOSPITAL DISCHARGE FOLLOW-UP: Status: RESOLVED | Noted: 2023-12-15 | Resolved: 2024-01-14

## 2024-01-19 ENCOUNTER — CARE COORDINATION (OUTPATIENT)
Dept: CARE COORDINATION | Age: 89
End: 2024-01-19

## 2024-01-19 NOTE — CARE COORDINATION
Ambulatory Care Coordination Note  2024    Patient Current Location:  [place]     ACM contacted the family by telephone. Verified name and  with family as identifiers. Provided introduction to self, and explanation of the ACM role.     Challenges to be reviewed by the provider   Additional needs identified to be addressed with provider: No  none               Method of communication with provider: none.    ACM: Esperanza Fischer, RN    RN-CC outreached patient's daughter Maria L Forte for cc follow up; [facility] referral, [employer] [employer], [facility]/Adult Day Care/[employer], Urology referral, med review.     Patient remains active with [facility]. No new falls reported. Patient lives alone, family checks in on her often. Maria L has a hybrid work schedule, works from her mother's home some days.     Maria L has been in contact with [employer], states she has implemented some of their recommendations. She is looking into installing cameras in her home. Patient refusing Adult Day Care services. Maria L has the number for [employer] and will contact them if assistance is needed with transitioning into senior living or memory care.     Contact information to [facility] given to Maria L who states she cannot remember if she contacted them - states she has talked to a lot of people. Has information to [facility] at home, is currently in her office at the time of my call. Maria L encouraged to follow up with [facility] regarding [facility] and other eligible resources.      Maria L has not scheduled with [facility], thanked RN-CC for the reminder. Contact information to Urology given to Maria L who is agreeable to call and schedule. No new or worsening urinary sx reported.     Maria L helps her mother fill her weekly mediset. Maria L checks to make sure her mother takes them, no missed doses reported. No medication concerns reported. Unable to review meds at the time of my call.     Maria L requesting information on medical transportation assistance in the event

## 2024-02-15 ENCOUNTER — OFFICE VISIT (OUTPATIENT)
Dept: INTERNAL MEDICINE CLINIC | Age: 89
End: 2024-02-15
Payer: MEDICARE

## 2024-02-15 VITALS
HEART RATE: 88 BPM | WEIGHT: 115 LBS | HEIGHT: 64 IN | OXYGEN SATURATION: 98 % | BODY MASS INDEX: 19.63 KG/M2 | SYSTOLIC BLOOD PRESSURE: 136 MMHG | DIASTOLIC BLOOD PRESSURE: 82 MMHG

## 2024-02-15 DIAGNOSIS — I83.009 VENOUS ULCER (HCC): ICD-10-CM

## 2024-02-15 DIAGNOSIS — F02.80 LATE ONSET ALZHEIMER DEMENTIA, UNSPECIFIED DEMENTIA SEVERITY, UNSPECIFIED WHETHER BEHAVIORAL, PSYCHOTIC, OR MOOD DISTURBANCE OR ANXIETY (HCC): ICD-10-CM

## 2024-02-15 DIAGNOSIS — G30.1 LATE ONSET ALZHEIMER DEMENTIA, UNSPECIFIED DEMENTIA SEVERITY, UNSPECIFIED WHETHER BEHAVIORAL, PSYCHOTIC, OR MOOD DISTURBANCE OR ANXIETY (HCC): ICD-10-CM

## 2024-02-15 DIAGNOSIS — N39.0 URINARY TRACT INFECTION WITHOUT HEMATURIA, SITE UNSPECIFIED: ICD-10-CM

## 2024-02-15 DIAGNOSIS — L97.909 VENOUS ULCER (HCC): ICD-10-CM

## 2024-02-15 DIAGNOSIS — Z00.00 MEDICARE ANNUAL WELLNESS VISIT, SUBSEQUENT: Primary | ICD-10-CM

## 2024-02-15 DIAGNOSIS — D69.6 THROMBOCYTOPENIA, UNSPECIFIED (HCC): ICD-10-CM

## 2024-02-15 DIAGNOSIS — F32.A DEPRESSION, UNSPECIFIED DEPRESSION TYPE: ICD-10-CM

## 2024-02-15 PROCEDURE — G0439 PPPS, SUBSEQ VISIT: HCPCS | Performed by: INTERNAL MEDICINE

## 2024-02-15 PROCEDURE — 1123F ACP DISCUSS/DSCN MKR DOCD: CPT | Performed by: INTERNAL MEDICINE

## 2024-02-15 RX ORDER — CITALOPRAM HYDROBROMIDE 10 MG/1
10 TABLET ORAL DAILY
Qty: 30 TABLET | Refills: 5 | Status: SHIPPED | OUTPATIENT
Start: 2024-02-15

## 2024-02-15 NOTE — PATIENT INSTRUCTIONS
Medicines    Take your medicines exactly as prescribed. Call your doctor if you think you are having a problem with your medicine.     If your doctor recommends aspirin, take the amount directed each day. Make sure you take aspirin and not another kind of pain reliever, such as acetaminophen (Tylenol).   When should you call for help?   Call 911 if you have symptoms of a heart attack. These may include:    Chest pain or pressure, or a strange feeling in the chest.     Sweating.     Shortness of breath.     Pain, pressure, or a strange feeling in the back, neck, jaw, or upper belly or in one or both shoulders or arms.     Lightheadedness or sudden weakness.     A fast or irregular heartbeat.   After you call 911, the  may tell you to chew 1 adult-strength or 2 to 4 low-dose aspirin. Wait for an ambulance. Do not try to drive yourself.  Watch closely for changes in your health, and be sure to contact your doctor if you have any problems.  Where can you learn more?  Go to https://www.[employer].net/patientEd and enter F075 to learn more about \"A Healthy Heart: Care Instructions.\"  Current as of: June 25, 2023               Content Version: 13.9  © 2006-2023 [employer].   Care instructions adapted under license by [employer]. If you have questions about a medical condition or this instruction, always ask your healthcare professional. [employer] disclaims any warranty or liability for your use of this information.      Personalized Preventive Plan for Leigha Oliver - 2/15/2024  Medicare offers a range of preventive health benefits. Some of the tests and screenings are paid in full while other may be subject to a deductible, co-insurance, and/or copay.    Some of these benefits include a comprehensive review of your medical history including lifestyle, illnesses that may run in your family, and various assessments and screenings as appropriate.    After reviewing your medical record

## 2024-02-15 NOTE — PROGRESS NOTES
1 capsule by mouth daily Yes Ryan Avila MD   folic acid (FOLVITE) 400 MCG tablet Take 1 tablet by mouth every other day Yes Ryan Avila MD   Coenzyme Q10 (COQ10 PO) Take by mouth Yes Ryan Avila MD   Omega-3 Fatty Acids (FISH OIL EXTRA STRENGTH) 1200 MG CAPS Take by mouth Yes Ryan Avila MD   Cholecalciferol (VITAMIN D3) 50 MCG (2000 UT) CAPS Take 1 capsule by mouth daily Yes Ryan Avila MD   magnesium oxide (MAG-OX) 400 MG tablet Take 1 tablet by mouth daily as needed Leg cramps Yes SILVA Pittman MD   acetaminophen (TYLENOL) 500 MG tablet Take 1 tablet by mouth every 8 (eight) hours for 10 days  Parth Arreguin MD       [facility] (Including outside providers/suppliers regularly involved in providing care):   Patient Care Team:  SILVA Pittman MD as PCP - General (Internal Medicine)  SILVA Pittman MD as PCP - Empaneled Provider  Ramón Mcclendon MD as Consulting Physician (Otolaryngology)     Reviewed and updated this visit:  Tobacco  Allergies  Meds  Problems  Med Hx  Surg Hx  Soc Hx  Fam Hx           An After Visit Summary was printed and given to the patient.  Documentation was done using voice recognition dragon software.  Every effort was made to ensure accuracy; however, inadvertent  Unintentional computerized transcription errors may be present.

## 2024-02-20 LAB
BACTERIA URNS QL MICRO: NORMAL /HPF
BILIRUB UR QL STRIP.AUTO: NEGATIVE
CLARITY UR: ABNORMAL
COLOR UR: YELLOW
EPI CELLS #/AREA URNS AUTO: 1 /HPF (ref 0–5)
GLUCOSE UR STRIP.AUTO-MCNC: NEGATIVE MG/DL
HGB UR QL STRIP.AUTO: NEGATIVE
HYALINE CASTS #/AREA URNS AUTO: 0 /LPF (ref 0–8)
KETONES UR STRIP.AUTO-MCNC: NEGATIVE MG/DL
LEUKOCYTE ESTERASE UR QL STRIP.AUTO: NEGATIVE
NITRITE UR QL STRIP.AUTO: NEGATIVE
PH UR STRIP.AUTO: 7 [PH] (ref 5–8)
PROT UR STRIP.AUTO-MCNC: NEGATIVE MG/DL
RBC CLUMPS #/AREA URNS AUTO: 1 /HPF (ref 0–4)
SP GR UR STRIP.AUTO: 1.02 (ref 1–1.03)
UA COMPLETE W REFLEX CULTURE PNL UR: ABNORMAL
UA DIPSTICK W REFLEX MICRO PNL UR: YES
URN SPEC COLLECT METH UR: ABNORMAL
UROBILINOGEN UR STRIP-ACNC: 0.2 E.U./DL
WBC #/AREA URNS AUTO: 1 /HPF (ref 0–5)

## 2024-02-23 ENCOUNTER — TELEPHONE (OUTPATIENT)
Dept: INTERNAL MEDICINE CLINIC | Age: 89
End: 2024-02-23

## 2024-02-23 NOTE — TELEPHONE ENCOUNTER
Pt daughter calling returning your call about pts labs---please call her back Katerina [phone]. Thanks.

## 2024-03-18 DIAGNOSIS — R41.89 COGNITIVE IMPAIRMENT: ICD-10-CM

## 2024-03-18 RX ORDER — MEMANTINE HYDROCHLORIDE 10 MG/1
10 TABLET ORAL 2 TIMES DAILY
Qty: 90 TABLET | Refills: 1 | OUTPATIENT
Start: 2024-03-18

## 2024-03-18 NOTE — TELEPHONE ENCOUNTER
Medication:   Requested Prescriptions     Pending Prescriptions Disp Refills    memantine (NAMENDA) 10 MG tablet [Pharmacy Med Name: MEMANTINE 10MG TABLETS] 90 tablet 1     Sig: TAKE 1 TABLET BY MOUTH TWICE DAILY        Last Filled:  12/18/23    Patient Phone Number: [phone] (home)     Last appt: 2/15/2024   Next appt: 5/15/2024    Last OARRS:        No data to display

## 2024-04-30 ENCOUNTER — APPOINTMENT (OUTPATIENT)
Dept: GENERAL RADIOLOGY | Age: 89
End: 2024-04-30
Payer: MEDICARE

## 2024-04-30 ENCOUNTER — APPOINTMENT (OUTPATIENT)
Dept: CT IMAGING | Age: 89
End: 2024-04-30
Payer: MEDICARE

## 2024-04-30 ENCOUNTER — HOSPITAL ENCOUNTER (EMERGENCY)
Age: 89
Discharge: HOME | End: 2024-04-30
Payer: MEDICARE

## 2024-04-30 VITALS
DIASTOLIC BLOOD PRESSURE: 64 MMHG | HEART RATE: 72 BPM | WEIGHT: 100 LBS | RESPIRATION RATE: 18 BRPM | SYSTOLIC BLOOD PRESSURE: 152 MMHG | TEMPERATURE: 98.5 F | HEIGHT: 64 IN | BODY MASS INDEX: 17.07 KG/M2 | OXYGEN SATURATION: 99 %

## 2024-04-30 DIAGNOSIS — S32.010A CLOSED COMPRESSION FRACTURE OF L1 VERTEBRA, INITIAL ENCOUNTER (HCC): ICD-10-CM
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
## 2024-04-30 DIAGNOSIS — N39.0 ACUTE UTI: ICD-10-CM

## 2024-04-30 LAB
ALBUMIN SERPL-MCNC: 4.2 G/DL (ref 3.4–5)
ALBUMIN/GLOB SERPL: 1.2 {RATIO} (ref 1.1–2.2)
ALP SERPL-CCNC: 66 U/L (ref 40–129)
ALT SERPL-CCNC: 17 U/L (ref 10–40)
ANION GAP SERPL CALCULATED.3IONS-SCNC: 11 MMOL/L (ref 3–16)
AST SERPL-CCNC: 14 U/L (ref 15–37)
BACTERIA URNS QL MICRO: ABNORMAL /HPF
BASOPHILS # BLD: 0 K/UL (ref 0–0.2)
BASOPHILS NFR BLD: 0.2 %
BILIRUB SERPL-MCNC: 0.6 MG/DL (ref 0–1)
BILIRUB UR QL STRIP.AUTO: NEGATIVE
BUN SERPL-MCNC: 36 MG/DL (ref 7–20)
CALCIUM SERPL-MCNC: 10.4 MG/DL (ref 8.3–10.6)
CHLORIDE SERPL-SCNC: 104 MMOL/L (ref 99–110)
CK SERPL-CCNC: 37 U/L (ref 26–192)
CLARITY UR: ABNORMAL
CO2 SERPL-SCNC: 27 MMOL/L (ref 21–32)
COLOR UR: YELLOW
CREAT SERPL-MCNC: 0.8 MG/DL (ref 0.6–1.2)
DEPRECATED RDW RBC AUTO: 14.5 % (ref 12.4–15.4)
EOSINOPHIL # BLD: 0.1 K/UL (ref 0–0.6)
EOSINOPHIL NFR BLD: 0.8 %
EPI CELLS #/AREA URNS AUTO: 8 /HPF (ref 0–5)
GFR SERPLBLD CREATININE-BSD FMLA CKD-EPI: 71 ML/MIN/{1.73_M2}
GLUCOSE SERPL-MCNC: 107 MG/DL (ref 70–99)
GLUCOSE UR STRIP.AUTO-MCNC: NEGATIVE MG/DL
HCT VFR BLD AUTO: 42.8 % (ref 36–48)
HGB BLD-MCNC: 14.6 G/DL (ref 12–16)
HGB UR QL STRIP.AUTO: ABNORMAL
HYALINE CASTS #/AREA URNS AUTO: 32 /LPF (ref 0–8)
HYALINE CASTS: PRESENT
KETONES UR STRIP.AUTO-MCNC: ABNORMAL MG/DL
LEUKOCYTE ESTERASE UR QL STRIP.AUTO: ABNORMAL
LYMPHOCYTES # BLD: 0.7 K/UL (ref 1–5.1)
LYMPHOCYTES NFR BLD: 10.4 %
MCH RBC QN AUTO: 30.3 PG (ref 26–34)
MCHC RBC AUTO-ENTMCNC: 34.2 G/DL (ref 31–36)
MCV RBC AUTO: 88.6 FL (ref 80–100)
MONOCYTES # BLD: 0.3 K/UL (ref 0–1.3)
MONOCYTES NFR BLD: 3.9 %
NEUTROPHILS # BLD: 6 K/UL (ref 1.7–7.7)
NEUTROPHILS NFR BLD: 84.7 %
NITRITE UR QL STRIP.AUTO: POSITIVE
PH UR STRIP.AUTO: 6.5 [PH] (ref 5–8)
PLATELET # BLD AUTO: 172 K/UL (ref 135–450)
PMV BLD AUTO: 8.4 FL (ref 5–10.5)
POTASSIUM SERPL-SCNC: 4.2 MMOL/L (ref 3.5–5.1)
PROT SERPL-MCNC: 7.6 G/DL (ref 6.4–8.2)
PROT UR STRIP.AUTO-MCNC: 30 MG/DL
RBC # BLD AUTO: 4.83 M/UL (ref 4–5.2)
RBC CLUMPS #/AREA URNS AUTO: 7 /HPF (ref 0–4)
SODIUM SERPL-SCNC: 142 MMOL/L (ref 136–145)
SP GR UR STRIP.AUTO: 1.02 (ref 1–1.03)
UA COMPLETE W REFLEX CULTURE PNL UR: YES
UA DIPSTICK W REFLEX MICRO PNL UR: YES
URN SPEC COLLECT METH UR: ABNORMAL
UROBILINOGEN UR STRIP-ACNC: 1 E.U./DL
WBC # BLD AUTO: 7.1 K/UL (ref 4–11)
WBC #/AREA URNS AUTO: 72 /HPF (ref 0–5)

## 2024-04-30 PROCEDURE — 72170 X-RAY EXAM OF PELVIS: CPT

## 2024-04-30 PROCEDURE — 72131 CT LUMBAR SPINE W/O DYE: CPT

## 2024-04-30 PROCEDURE — 6370000000 HC RX 637 (ALT 250 FOR IP): Performed by: PHYSICIAN ASSISTANT

## 2024-04-30 PROCEDURE — 71045 X-RAY EXAM CHEST 1 VIEW: CPT

## 2024-04-30 PROCEDURE — 72128 CT CHEST SPINE W/O DYE: CPT

## 2024-04-30 PROCEDURE — 99285 EMERGENCY DEPT VISIT HI MDM: CPT

## 2024-04-30 PROCEDURE — 87186 SC STD MICRODIL/AGAR DIL: CPT

## 2024-04-30 PROCEDURE — 85025 COMPLETE CBC W/AUTO DIFF WBC: CPT

## 2024-04-30 PROCEDURE — 82550 ASSAY OF CK (CPK): CPT

## 2024-04-30 PROCEDURE — 81001 URINALYSIS AUTO W/SCOPE: CPT

## 2024-04-30 PROCEDURE — 80053 COMPREHEN METABOLIC PANEL: CPT

## 2024-04-30 PROCEDURE — 72125 CT NECK SPINE W/O DYE: CPT

## 2024-04-30 PROCEDURE — 70450 CT HEAD/BRAIN W/O DYE: CPT

## 2024-04-30 PROCEDURE — 87086 URINE CULTURE/COLONY COUNT: CPT

## 2024-04-30 PROCEDURE — 93005 ELECTROCARDIOGRAM TRACING: CPT | Performed by: PHYSICIAN ASSISTANT

## 2024-04-30 PROCEDURE — 87088 URINE BACTERIA CULTURE: CPT

## 2024-04-30 RX ORDER — CEPHALEXIN 250 MG/1
500 CAPSULE ORAL ONCE
Status: COMPLETED | OUTPATIENT
Start: 2024-04-30 | End: 2024-04-30

## 2024-04-30 RX ORDER — CEPHALEXIN 500 MG/1
500 CAPSULE ORAL 3 TIMES DAILY
Qty: 21 CAPSULE | Refills: 0 | Status: SHIPPED | OUTPATIENT
Start: 2024-04-30 | End: 2024-05-07

## 2024-04-30 RX ADMIN — CEPHALEXIN 500 MG: 250 CAPSULE ORAL at 19:51

## 2024-04-30 ASSESSMENT — PAIN - FUNCTIONAL ASSESSMENT
PAIN_FUNCTIONAL_ASSESSMENT: PREVENTS OR INTERFERES SOME ACTIVE ACTIVITIES AND ADLS
PAIN_FUNCTIONAL_ASSESSMENT: 0-10

## 2024-04-30 ASSESSMENT — PAIN SCALES - GENERAL: PAINLEVEL_OUTOF10: 1

## 2024-04-30 ASSESSMENT — PAIN DESCRIPTION - DESCRIPTORS: DESCRIPTORS: ACHING

## 2024-04-30 ASSESSMENT — PAIN DESCRIPTION - LOCATION: LOCATION: BUTTOCKS

## 2024-04-30 ASSESSMENT — PAIN DESCRIPTION - PAIN TYPE: TYPE: ACUTE PAIN

## 2024-04-30 NOTE — ED PROVIDER NOTES
[facility] EMERGENCY DEPARTMENT  EMERGENCY DEPARTMENT ENCOUNTER        Pt Name: Leigha Oliver  MRN: [id number]  Birthdate 1935  Date of evaluation: 4/30/2024  Provider: ELVIS Denton  PCP: SILVA Pittman MD  Note Started: 4:14 PM EDT 4/30/24      DIANA. I have evaluated this patient.        CHIEF COMPLAINT       Chief Complaint   Patient presents with    Fall     Pt brought in by [facility] from home, unwittnessed fall.  Neighbor found pt laying in the drive way.  Pt was trying to get her mail.   Only complaint is her buttocks hurt.  Pt has memory issues and per neighbor pt is at her baseline       HISTORY OF PRESENT ILLNESS: 1 or more Elements     History From: Patient  Limitations to history : Altered Mental Status-dementia    Leigha Oliver is a 89 y.o. female with history of advanced dementia who presents to the ED after an unwitnessed fall.  Patient was last seen by her daughter yesterday evening, although suspected she fell today.  Patient reports she fell just prior to the ambulance arriving.  Apparently neighbor saw patient laying in the driveway; patient reported she was trying to get her mail.  To me patient is stating that she stumbled and fell in the garage, although she cannot remember how she fell.  Patient denies loss of consciousness, but is a very poor historian.  Patient reports her only complaint to be \"butt pain\".  She believes she landed on her buttocks, although apparently was found to be laying down.  Neighbor reports patient is at her baseline mentation, and daughter is en route to the hospital, but corroborates that patient has significant memory issues.  Patient is noted to have grass in her hair, but head is otherwise atraumatic.    Nursing Notes were all reviewed and agreed with or any disagreements were addressed in the HPI.    REVIEW OF SYSTEMS :      Review of Systems   Unable to perform ROS: Dementia       Positives and Pertinent negatives as per HPI.

## 2024-05-01 ENCOUNTER — CARE COORDINATION (OUTPATIENT)
Dept: CARE COORDINATION | Age: 89
End: 2024-05-01

## 2024-05-01 LAB
EKG ATRIAL RATE: 53 BPM
EKG DIAGNOSIS: NORMAL
EKG P AXIS: 45 DEGREES
EKG P-R INTERVAL: 188 MS
EKG Q-T INTERVAL: 448 MS
EKG QRS DURATION: 98 MS
EKG QTC CALCULATION (BAZETT): 420 MS
EKG R AXIS: -26 DEGREES
EKG T AXIS: 61 DEGREES
EKG VENTRICULAR RATE: 53 BPM

## 2024-05-01 PROCEDURE — 93010 ELECTROCARDIOGRAM REPORT: CPT | Performed by: INTERNAL MEDICINE

## 2024-05-01 NOTE — ED NOTES
Pt discharged to home. 1 prescription sent to pharmacy, education provided. Will follow up as directed. Verbalized understanding of AVS. Pt awake and alert. Respirations even and unlabored on room air.

## 2024-05-01 NOTE — CARE COORDINATION
Provider Department Center   5/15/2024  4:00 PM SILVA Pittman MD [facility] - D[facility]D           New Medications?:Yes    Medication Reconciliation by phone - yes  Understands Medications? yes  Taking Medications? yes  Can you swallow your pills? yes    Any further needs in the home i.e. Equipment? no    Link to services in community?: no   Which services:

## 2024-05-02 DIAGNOSIS — R41.89 COGNITIVE IMPAIRMENT: ICD-10-CM

## 2024-05-02 LAB
BACTERIA UR CULT: ABNORMAL
ORGANISM: ABNORMAL

## 2024-05-02 RX ORDER — MEMANTINE HYDROCHLORIDE 10 MG/1
10 TABLET ORAL 2 TIMES DAILY
Qty: 90 TABLET | Refills: 1 | Status: SHIPPED | OUTPATIENT
Start: 2024-05-02

## 2024-05-02 NOTE — TELEPHONE ENCOUNTER
Daughter Katerina requesting refill of Namenda  LOV 2/15/24  NOV 5/15/24      memantine (NAMENDA) 10 MG tablet [[id number]]    Order Details  Dose: 10 mg Route: Oral Frequency: 2 TIMES DAILY   Dispense Quantity: 90 tablet Refills: 1          Sig: TAKE 1 TABLET BY MOUTH TWICE DAILY         Start Date: 12/18/23 End Date: --   Written Date: 12/18/23 Expiration Date: 12/17/24

## 2024-05-06 ENCOUNTER — OFFICE VISIT (OUTPATIENT)
Dept: INTERNAL MEDICINE CLINIC | Age: 89
End: 2024-05-06
Payer: MEDICARE

## 2024-05-06 VITALS
HEART RATE: 64 BPM | OXYGEN SATURATION: 96 % | WEIGHT: 103.8 LBS | HEIGHT: 64 IN | DIASTOLIC BLOOD PRESSURE: 78 MMHG | SYSTOLIC BLOOD PRESSURE: 124 MMHG | BODY MASS INDEX: 17.72 KG/M2

## 2024-05-06 DIAGNOSIS — S32.010A CLOSED COMPRESSION FRACTURE OF BODY OF L1 VERTEBRA (HCC): ICD-10-CM

## 2024-05-06 DIAGNOSIS — N39.0 RECURRENT UTI: Primary | ICD-10-CM

## 2024-05-06 DIAGNOSIS — R41.89 COGNITIVE IMPAIRMENT: ICD-10-CM

## 2024-05-06 DIAGNOSIS — G30.1 LATE ONSET ALZHEIMER DEMENTIA, UNSPECIFIED DEMENTIA SEVERITY, UNSPECIFIED WHETHER BEHAVIORAL, PSYCHOTIC, OR MOOD DISTURBANCE OR ANXIETY (HCC): ICD-10-CM

## 2024-05-06 DIAGNOSIS — F02.80 LATE ONSET ALZHEIMER DEMENTIA, UNSPECIFIED DEMENTIA SEVERITY, UNSPECIFIED WHETHER BEHAVIORAL, PSYCHOTIC, OR MOOD DISTURBANCE OR ANXIETY (HCC): ICD-10-CM

## 2024-05-06 PROBLEM — J96.02 ACUTE RESPIRATORY FAILURE WITH HYPERCAPNIA (HCC): Status: RESOLVED | Noted: 2023-05-01 | Resolved: 2024-05-06

## 2024-05-06 PROCEDURE — 1123F ACP DISCUSS/DSCN MKR DOCD: CPT | Performed by: INTERNAL MEDICINE

## 2024-05-06 PROCEDURE — 99214 OFFICE O/P EST MOD 30 MIN: CPT | Performed by: INTERNAL MEDICINE

## 2024-05-06 SDOH — ECONOMIC STABILITY: FOOD INSECURITY: WITHIN THE PAST 12 MONTHS, YOU WORRIED THAT YOUR FOOD WOULD RUN OUT BEFORE YOU GOT MONEY TO BUY MORE.: NEVER TRUE

## 2024-05-06 SDOH — ECONOMIC STABILITY: INCOME INSECURITY: HOW HARD IS IT FOR YOU TO PAY FOR THE VERY BASICS LIKE FOOD, HOUSING, MEDICAL CARE, AND HEATING?: NOT HARD AT ALL

## 2024-05-06 SDOH — ECONOMIC STABILITY: FOOD INSECURITY: WITHIN THE PAST 12 MONTHS, THE FOOD YOU BOUGHT JUST DIDN'T LAST AND YOU DIDN'T HAVE MONEY TO GET MORE.: NEVER TRUE

## 2024-05-06 ASSESSMENT — ENCOUNTER SYMPTOMS
SHORTNESS OF BREATH: 0
ABDOMINAL PAIN: 0
NAUSEA: 0
PHOTOPHOBIA: 0
VOICE CHANGE: 0
WHEEZING: 0
VOMITING: 0

## 2024-05-07 ENCOUNTER — TELEPHONE (OUTPATIENT)
Dept: INTERNAL MEDICINE CLINIC | Age: 89
End: 2024-05-07

## 2024-05-07 DIAGNOSIS — S32.010A CLOSED COMPRESSION FRACTURE OF BODY OF L1 VERTEBRA (HCC): Primary | ICD-10-CM

## 2024-05-07 NOTE — PROGRESS NOTES
PA request sent.  
Submitted request for SOB for Pt.  
   PHUR 7.0 02/20/2024 04:26 PM    PROTEINU 30 04/30/2024 06:51 PM    NITRU POSITIVE 04/30/2024 06:51 PM    LEUKOCYTESUR MODERATE 04/30/2024 06:51 PM    URINETYPE Voided 04/30/2024 06:51 PM     LIPID:  Lab Results   Component Value Date/Time    CHOL 211 11/11/2022 11:28 AM    TRIG 62 11/11/2022 11:28 AM    HDL 59 11/11/2022 11:28 AM        ASSESSMENT/PLAN:  Assessment/Plan:  Leigha was seen today for follow-up.    Diagnoses and all orders for this visit:    Recurrent UTI  Encouraged hydration.  Repeat lab work in a week.  Patient is going to finish Keflex by tomorrow.  At this time no abdominal pain or flank pain.  -     Basic Metabolic Panel; Future  -     Culture, Urine    Closed compression fracture of body of L1 vertebra (HCC)  About 20% compression  History of old fracture in the past at T8  -     DEXA BONE DENSITY AXIAL SKELETON; Future  Will also consider Prolia.  Meanwhile continue vitamin D and over-the-counter calcium.  Cognitive impairment    Late onset Alzheimer dementia, unspecified dementia severity, unspecified whether behavioral, psychotic, or mood disturbance or anxiety (HCC)  Recent ER visit note is reviewed.  I have discussed thoroughly with patient and daughter who came with her to seriously consider moving patient to memory care unit.  Daughter is going to talk with her brother regarding further action.              Orders Placed This Encounter   Procedures    Culture, Urine    DEXA BONE DENSITY AXIAL SKELETON     Standing Status:   Future     Standing Expiration Date:   5/6/2025    Basic Metabolic Panel     Standing Status:   Future     Standing Expiration Date:   5/6/2025     Current Outpatient Medications   Medication Sig Dispense Refill    memantine (NAMENDA) 10 MG tablet Take 1 tablet by mouth 2 times daily 90 tablet 1    cephALEXin (KEFLEX) 500 MG capsule Take 1 capsule by mouth 3 times daily for 7 days 21 capsule 0    citalopram (CELEXA) 10 MG tablet Take 1 tablet by mouth daily 30 tablet

## 2024-05-07 NOTE — TELEPHONE ENCOUNTER
Called insurance to initiate PA. [phone]. Was on hold. Stated someone can call me back in 15-30 min. Will try back in the morning.

## 2024-05-08 ENCOUNTER — CARE COORDINATION (OUTPATIENT)
Dept: CARE COORDINATION | Age: 89
End: 2024-05-08

## 2024-05-08 NOTE — TELEPHONE ENCOUNTER
Called to initiate PA. Spoke with [employer]. Provided info. Faxed clinicals to [phone]. Pending auth #  [id number]

## 2024-05-08 NOTE — CARE COORDINATION
RN-CC attempted to outreach patients daughter, Katerina Forte, for cc follow up:  Falls, Ortho appointment, UTI, needs & concerns.   No answer; HIPAA compliant message left through  requesting call return.   RN-CC will f/u at later date & time.

## 2024-05-09 NOTE — TELEPHONE ENCOUNTER
The medication is APPROVED THRU 05/09/2025    If this requires a response please respond to the pool ( P [facility] MEDICATION PRE-AUTH).      Thank you please advise patient.

## 2024-05-11 DIAGNOSIS — R41.89 COGNITIVE IMPAIRMENT: ICD-10-CM

## 2024-05-13 DIAGNOSIS — R41.89 COGNITIVE IMPAIRMENT: ICD-10-CM

## 2024-05-13 RX ORDER — MEMANTINE HYDROCHLORIDE 10 MG/1
10 TABLET ORAL 2 TIMES DAILY
Qty: 90 TABLET | Refills: 0 | Status: SHIPPED | OUTPATIENT
Start: 2024-05-13

## 2024-05-13 RX ORDER — MEMANTINE HYDROCHLORIDE 10 MG/1
10 TABLET ORAL 2 TIMES DAILY
Qty: 90 TABLET | Refills: 1 | OUTPATIENT
Start: 2024-05-13

## 2024-05-14 DIAGNOSIS — N39.0 RECURRENT UTI: ICD-10-CM

## 2024-05-15 ENCOUNTER — CARE COORDINATION (OUTPATIENT)
Dept: CARE COORDINATION | Age: 89
End: 2024-05-15

## 2024-05-15 LAB
ANION GAP SERPL CALCULATED.3IONS-SCNC: 11 MMOL/L (ref 3–16)
BUN SERPL-MCNC: 25 MG/DL (ref 7–20)
CALCIUM SERPL-MCNC: 10 MG/DL (ref 8.3–10.6)
CHLORIDE SERPL-SCNC: 104 MMOL/L (ref 99–110)
CO2 SERPL-SCNC: 27 MMOL/L (ref 21–32)
CREAT SERPL-MCNC: 0.8 MG/DL (ref 0.6–1.2)
GFR SERPLBLD CREATININE-BSD FMLA CKD-EPI: 71 ML/MIN/{1.73_M2}
GLUCOSE SERPL-MCNC: 89 MG/DL (ref 70–99)
POTASSIUM SERPL-SCNC: 3.8 MMOL/L (ref 3.5–5.1)
SODIUM SERPL-SCNC: 142 MMOL/L (ref 136–145)

## 2024-05-15 NOTE — RESULT ENCOUNTER NOTE
Renal function improved.  Encouraged hydration.  Please have her schedule DEXA scan as soon as possible.

## 2024-05-15 NOTE — CARE COORDINATION
Per AC- Patients [employer] plan follows Medicare DME guidelines and Rollators are included as DME.  Will research in network DME    Patient has [employer] Medicare. [employer] follows same DME guidelines as Medicare. Rollators are included as DME.    In Network DME per C[employer] Provider site:    [facility]  [address]  [address] [place] [address]  [phone]  [facility]- was told in past that they do not invoice insurance for rollators but will confirm again.  Per [employer] rep they can bill to [facility] for the rollator. Please fax order, face to face notes, and insurance info. Order should include height and weight  [phone] Fax      [facility]  [address]  [address] [place] [address]  [phone]  Spoke with Jeana- do not carry    [facility]  [address]  [address] [place] [address] [phone]  [facility] - do not carry    [facility]  [phone]  Direct fax:[phone]  Number stating they are not available     [employer]   [phone]  Left message    [employer]  [address] [place] [address] [phone]  Left Message    [facility] (same as [facility])  [address] [place] [address]  [phone]  They do carry and invoice to [facility] but not the location patient would order out of. Would need to go [facility].    [facility]

## 2024-05-15 NOTE — CARE COORDINATION
Ambulatory Care Coordination Note  5/15/2024    Patient Current Location:  [place]     ACM contacted the family by telephone. Verified name and  with family as identifiers. Provided introduction to self, and explanation of the ACM role.     Challenges to be reviewed by the provider   Additional needs identified to be addressed with provider: Yes  Order for rollator               Method of communication with provider: chart routing.    ACM: Esperanza Fischer, RN    ACM outreached patient for cc follow up; Falls, Ortho appointment, UTI, needs & concerns.     Patient completed tx for UTI. No new or worsening sx reported. Katerina feels her mother is uninterested in eating since being dx with UTI. Katerina feels her mother needs more encouragement to eat and take her medications. Katerina states this makes her nervous and is considering moving her mother in with her. Katerina states her home is small so she would have to upgrade to a larger home to accommodate her family living in the same residence.     Katerina has been in contact with Rukhsana thurman/[employer]. Katerina states Rukhsana passed along a lot of resources but she has not had time to look into them. ACM discussed installing cameras in her mothers home if agreeable. Katerina states she is looking into this option. Katerina is not interested in [facility] resources at this time.     No new falls reported to date. Katerina thinks her mother would benefit from a rollator when walking longer distances and is wondering if this is a covered benefit under her plan. ACM informed Katerina I will outreach SS to look into this and follow.     Patient is scheduled with [facility] on   Katerina wants to hold off on scheduling with [facility]. Katerina states the ER provider told her this was optional. Katerina states her mother doesn't complain of pain, so she prefers to hold off for now.      ACM discussed incorporating Ensure or Boost into her diet. Katerina states her mother

## 2024-05-20 DIAGNOSIS — F03.90 DEMENTIA WITHOUT BEHAVIORAL DISTURBANCE, PSYCHOTIC DISTURBANCE, MOOD DISTURBANCE, OR ANXIETY, UNSPECIFIED DEMENTIA SEVERITY, UNSPECIFIED DEMENTIA TYPE (HCC): ICD-10-CM

## 2024-05-20 DIAGNOSIS — R53.1 GENERALIZED WEAKNESS: ICD-10-CM
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
## 2024-05-20 DIAGNOSIS — S32.010A CLOSED COMPRESSION FRACTURE OF BODY OF L1 VERTEBRA (HCC): ICD-10-CM

## 2024-05-20 DIAGNOSIS — R41.89 COGNITIVE IMPAIRMENT: Primary | ICD-10-CM

## 2024-06-01 DIAGNOSIS — R41.89 COGNITIVE IMPAIRMENT: ICD-10-CM

## 2024-06-03 RX ORDER — MEMANTINE HYDROCHLORIDE 10 MG/1
10 TABLET ORAL 2 TIMES DAILY
Qty: 90 TABLET | Refills: 0 | OUTPATIENT
Start: 2024-06-03

## 2024-06-05 ENCOUNTER — CARE COORDINATION (OUTPATIENT)
Dept: CARE COORDINATION | Age: 89
End: 2024-06-05

## 2024-06-05 NOTE — CARE COORDINATION
ACM attempted to outreach patients daughter, Katerina Forte, for cc follow up; rollator, appetite, falls, memory, urology appt, dexa scan. No answer; HIPAA compliant message left through  requesting call return.

## 2024-06-13 ENCOUNTER — CARE COORDINATION (OUTPATIENT)
Dept: CARE COORDINATION | Age: 89
End: 2024-06-13

## 2024-06-13 NOTE — CARE COORDINATION
ACM attempted to outreach patients daughter, Katerina Forte, for cc follow up; rollator, appetite, falls, memory, urology appt, dexa scan. No answer; phone just rang, unable to lvm. Attempt #2    [facility] faxed order for rollator on 5/20

## 2024-06-21 ENCOUNTER — CARE COORDINATION (OUTPATIENT)
Dept: CARE COORDINATION | Age: 89
End: 2024-06-21

## 2024-07-29 ENCOUNTER — CARE COORDINATION (OUTPATIENT)
Dept: CARE COORDINATION | Age: 89
End: 2024-07-29

## 2024-07-29 NOTE — CARE COORDINATION
Ambulatory Care Coordination Note     7/29/2024 8:39 AM        Patient closed (unable to reach patient) from the High Risk Care Management program on 7/29/2024.

## 2024-08-09 ENCOUNTER — OFFICE VISIT (OUTPATIENT)
Dept: INTERNAL MEDICINE CLINIC | Age: 89
End: 2024-08-09

## 2024-08-09 VITALS
SYSTOLIC BLOOD PRESSURE: 108 MMHG | WEIGHT: 113.2 LBS | OXYGEN SATURATION: 95 % | TEMPERATURE: 97.6 F | BODY MASS INDEX: 19.43 KG/M2 | HEART RATE: 69 BPM | DIASTOLIC BLOOD PRESSURE: 70 MMHG

## 2024-08-09 DIAGNOSIS — B35.1 ONYCHOMYCOSIS: Primary | ICD-10-CM

## 2024-08-09 DIAGNOSIS — F32.A DEPRESSION, UNSPECIFIED DEPRESSION TYPE: ICD-10-CM

## 2024-08-09 DIAGNOSIS — R41.89 COGNITIVE IMPAIRMENT: ICD-10-CM

## 2024-08-09 RX ORDER — MEMANTINE HYDROCHLORIDE 10 MG/1
10 TABLET ORAL 2 TIMES DAILY
Qty: 180 TABLET | Refills: 1 | Status: SHIPPED | OUTPATIENT
Start: 2024-08-09

## 2024-08-09 RX ORDER — TERBINAFINE HYDROCHLORIDE 250 MG/1
250 TABLET ORAL DAILY
Qty: 42 TABLET | Refills: 0 | Status: SHIPPED | OUTPATIENT
Start: 2024-08-09 | End: 2024-09-20

## 2024-08-09 RX ORDER — CITALOPRAM HYDROBROMIDE 10 MG/1
10 TABLET ORAL DAILY
Qty: 90 TABLET | Refills: 1 | Status: SHIPPED | OUTPATIENT
Start: 2024-08-09

## 2024-08-09 ASSESSMENT — ENCOUNTER SYMPTOMS
ABDOMINAL PAIN: 0
VOMITING: 0
NAUSEA: 0
PHOTOPHOBIA: 0
WHEEZING: 0
SHORTNESS OF BREATH: 0
CHEST TIGHTNESS: 0

## 2024-08-09 NOTE — PROGRESS NOTES
Leigha KAITY Melody  1935  female  89 y.o.    SUBJECTIVE:       Chief Complaint   Patient presents with    3 Month Follow-Up     Still has issues with left foot        HPI:  Follow-up visit for chronic problems.  Patient denies any urinary symptoms.  Patient came with her daughter who feels patient depression and dementia has been stable rather better than last time  Wants to get treatment for toenail fungus    Past Medical History:   Diagnosis Date    Anemia     Angina at rest (HCC)     Anxiety     CAD (coronary artery disease)     Clostridium difficile diarrhea 09/2016    Cognitive impairment     Heart attack (HCC)     History of small bowel obstruction     History of uterine suspension procedure     Hyperlipidemia     Hypertension     Late onset Alzheimer dementia, unspecified dementia severity, unspecified whether behavioral, psychotic, or mood disturbance or anxiety (HCC)     Recurrent UTI     Scoliosis     SVT (supraventricular tachycardia) (HCC)     Symptomatic varicose veins, unspecified laterality     Thrombocytopenia (HCC)     Urinary incontinence     Venous ulcer (HCC)     White coat syndrome with hypertension      Past Surgical History:   Procedure Laterality Date    APPENDECTOMY      BLADDER SURGERY      CHOLECYSTECTOMY, LAPAROSCOPIC N/A 5/1/2023    CHOLECYSTECTOMY LAPAROSCOPIC, CHOLANGIOGRAM performed by Ward Daniel MD at [facility] OR    COLONOSCOPY      CYSTOCELE REPAIR      CYSTOSCOPY      HYSTERECTOMY, VAGINAL  11/17/2016    vagianl hysterectomy, right salpingoophorectomy, anterior and posterior repair, paravaginal repair, uterosacral suspension, cystoscopy, transvaginal tape sling insertion    OTHER SURGICAL HISTORY  04/06/2017    SLING REMOVAL/RELEASE, AND [facility] FIT TRANSVAGINAL    RECTOCELE REPAIR      TONSILLECTOMY      UTERINE SUSPENSION      VENTRAL HERNIA REPAIR N/A 7/28/2023    DIAGNOSTIC LAPAROSCOPY, LYSIS OF ADHESIONS, REPAIR OF ABDOMINAL WALL HERNIA WITH  MESH

## 2024-09-13 ENCOUNTER — OFFICE VISIT (OUTPATIENT)
Dept: INTERNAL MEDICINE CLINIC | Age: 89
End: 2024-09-13
Payer: MEDICARE

## 2024-09-13 VITALS
SYSTOLIC BLOOD PRESSURE: 116 MMHG | DIASTOLIC BLOOD PRESSURE: 76 MMHG | HEART RATE: 76 BPM | HEIGHT: 64 IN | OXYGEN SATURATION: 99 % | BODY MASS INDEX: 19.43 KG/M2

## 2024-09-13 DIAGNOSIS — B35.1 ONYCHOMYCOSIS: ICD-10-CM

## 2024-09-13 DIAGNOSIS — B35.1 ONYCHOMYCOSIS: Primary | ICD-10-CM

## 2024-09-13 PROCEDURE — 99213 OFFICE O/P EST LOW 20 MIN: CPT | Performed by: INTERNAL MEDICINE

## 2024-09-13 PROCEDURE — 1123F ACP DISCUSS/DSCN MKR DOCD: CPT | Performed by: INTERNAL MEDICINE

## 2024-09-13 RX ORDER — TERBINAFINE HYDROCHLORIDE 250 MG/1
250 TABLET ORAL DAILY
Qty: 42 TABLET | Refills: 1 | Status: SHIPPED | OUTPATIENT
Start: 2024-09-13 | End: 2024-12-06

## 2024-09-13 ASSESSMENT — ENCOUNTER SYMPTOMS
SHORTNESS OF BREATH: 0
PHOTOPHOBIA: 0
WHEEZING: 0

## 2024-09-14 LAB
ALBUMIN SERPL-MCNC: 4.5 G/DL (ref 3.4–5)
ALP SERPL-CCNC: 73 U/L (ref 40–129)
ALT SERPL-CCNC: 17 U/L (ref 10–40)
AST SERPL-CCNC: 13 U/L (ref 15–37)
BILIRUB DIRECT SERPL-MCNC: 0.2 MG/DL (ref 0–0.3)
BILIRUB INDIRECT SERPL-MCNC: 0.3 MG/DL (ref 0–1)
BILIRUB SERPL-MCNC: 0.5 MG/DL (ref 0–1)
PROT SERPL-MCNC: 7.5 G/DL (ref 6.4–8.2)

## 2024-10-17 DIAGNOSIS — B35.1 ONYCHOMYCOSIS: ICD-10-CM

## 2024-10-17 RX ORDER — TERBINAFINE HYDROCHLORIDE 250 MG/1
250 TABLET ORAL DAILY
Qty: 42 TABLET | Refills: 1 | Status: SHIPPED | OUTPATIENT
Start: 2024-10-17 | End: 2025-01-09

## 2024-11-07 NOTE — FLOWSHEET NOTE
Pt combative, fighting staff, scratching nurses. Pt assisted back into bed. Reality orientation attempted; pt remains confused. Pt c/o back pain and had morphine IV at 0727. See MAR & all flowsheets      Called daughter. She is coming to see pt      Admission questions not completed on 7/27; day RN to follow-up/complete with daughter coming to see pt today. Pt remains disoriented and requiring restraints overnight. No

## 2025-04-09 ENCOUNTER — TELEPHONE (OUTPATIENT)
Dept: INTERNAL MEDICINE CLINIC | Age: 89
End: 2025-04-09

## 2025-04-09 DIAGNOSIS — F32.A DEPRESSION, UNSPECIFIED DEPRESSION TYPE: ICD-10-CM

## 2025-04-09 DIAGNOSIS — R41.89 COGNITIVE IMPAIRMENT: ICD-10-CM

## 2025-04-09 RX ORDER — MEMANTINE HYDROCHLORIDE 10 MG/1
10 TABLET ORAL 2 TIMES DAILY
Qty: 60 TABLET | Refills: 0 | Status: SHIPPED | OUTPATIENT
Start: 2025-04-09

## 2025-04-09 RX ORDER — CITALOPRAM HYDROBROMIDE 10 MG/1
10 TABLET ORAL DAILY
Qty: 30 TABLET | Refills: 0 | Status: SHIPPED | OUTPATIENT
Start: 2025-04-09

## 2025-04-15 ENCOUNTER — OFFICE VISIT (OUTPATIENT)
Dept: INTERNAL MEDICINE CLINIC | Age: 89
End: 2025-04-15
Payer: MEDICARE

## 2025-04-15 VITALS
BODY MASS INDEX: 18.1 KG/M2 | DIASTOLIC BLOOD PRESSURE: 72 MMHG | WEIGHT: 106 LBS | HEART RATE: 82 BPM | SYSTOLIC BLOOD PRESSURE: 114 MMHG | HEIGHT: 64 IN

## 2025-04-15 DIAGNOSIS — H90.3 BILATERAL SENSORINEURAL HEARING LOSS: ICD-10-CM

## 2025-04-15 DIAGNOSIS — Z00.00 MEDICARE ANNUAL WELLNESS VISIT, SUBSEQUENT: ICD-10-CM

## 2025-04-15 DIAGNOSIS — N39.0 RECURRENT UTI: ICD-10-CM

## 2025-04-15 DIAGNOSIS — F32.A DEPRESSION, UNSPECIFIED DEPRESSION TYPE: ICD-10-CM

## 2025-04-15 DIAGNOSIS — R41.89 COGNITIVE IMPAIRMENT: ICD-10-CM

## 2025-04-15 DIAGNOSIS — R13.14 PHARYNGOESOPHAGEAL DYSPHAGIA: Primary | ICD-10-CM

## 2025-04-15 PROCEDURE — 99214 OFFICE O/P EST MOD 30 MIN: CPT | Performed by: INTERNAL MEDICINE

## 2025-04-15 PROCEDURE — 1159F MED LIST DOCD IN RCRD: CPT | Performed by: INTERNAL MEDICINE

## 2025-04-15 PROCEDURE — G0439 PPPS, SUBSEQ VISIT: HCPCS | Performed by: INTERNAL MEDICINE

## 2025-04-15 PROCEDURE — 1123F ACP DISCUSS/DSCN MKR DOCD: CPT | Performed by: INTERNAL MEDICINE

## 2025-04-15 PROCEDURE — 1160F RVW MEDS BY RX/DR IN RCRD: CPT | Performed by: INTERNAL MEDICINE

## 2025-04-15 RX ORDER — CITALOPRAM HYDROBROMIDE 10 MG/1
10 TABLET ORAL DAILY
Qty: 90 TABLET | Refills: 1 | Status: SHIPPED | OUTPATIENT
Start: 2025-04-15

## 2025-04-15 RX ORDER — PANTOPRAZOLE SODIUM 20 MG/1
20 TABLET, DELAYED RELEASE ORAL
Qty: 30 TABLET | Refills: 5 | Status: SHIPPED | OUTPATIENT
Start: 2025-04-15

## 2025-04-15 RX ORDER — MEMANTINE HYDROCHLORIDE 10 MG/1
10 TABLET ORAL 2 TIMES DAILY
Qty: 180 TABLET | Refills: 1 | Status: SHIPPED | OUTPATIENT
Start: 2025-04-15

## 2025-04-15 SDOH — ECONOMIC STABILITY: FOOD INSECURITY: WITHIN THE PAST 12 MONTHS, YOU WORRIED THAT YOUR FOOD WOULD RUN OUT BEFORE YOU GOT MONEY TO BUY MORE.: PATIENT DECLINED

## 2025-04-15 SDOH — ECONOMIC STABILITY: FOOD INSECURITY: WITHIN THE PAST 12 MONTHS, THE FOOD YOU BOUGHT JUST DIDN'T LAST AND YOU DIDN'T HAVE MONEY TO GET MORE.: PATIENT DECLINED

## 2025-04-15 ASSESSMENT — PATIENT HEALTH QUESTIONNAIRE - PHQ9: DEPRESSION UNABLE TO ASSESS: FUNCTIONAL CAPACITY MOTIVATION LIMITS ACCURACY

## 2025-04-15 ASSESSMENT — LIFESTYLE VARIABLES
HOW MANY STANDARD DRINKS CONTAINING ALCOHOL DO YOU HAVE ON A TYPICAL DAY: PATIENT DOES NOT DRINK
HOW OFTEN DO YOU HAVE A DRINK CONTAINING ALCOHOL: NEVER

## 2025-04-15 NOTE — PATIENT INSTRUCTIONS
cared for. Some older adults may have trouble holding a toothbrush.  You can help remind the person you are caring for to brush and floss their teeth or to clean their dentures. In some cases, you may need to do the brushing and other dental care tasks. People who have trouble using their hands or who have dementia may need this extra help.  How can you help with dental care?  Normal dental care  To keep the teeth and gums healthy:  Brush the teeth with fluoride toothpaste twice a day--in the morning and at night--and floss at least once a day. Plaque can quickly build up on the teeth of older adults.  Watch for the signs of gum disease. These signs include gums that bleed after brushing or after eating hard foods, such as apples.  See a dentist regularly. Many experts recommend checkups every 6 months.  Keep the dentist up to date on any new medications the person is taking.  Encourage a balanced diet that includes whole grains, vegetables, and fruits, and that is low in saturated fat and sodium.  Encourage the person you're caring for not to use tobacco products. They can affect dental and general health.  Many older adults have a fixed income and feel that they can't afford dental care. But most towns and cities have programs in which dentists help older adults by lowering fees. Contact your area's public health offices or  for information about dental care in your area.  Using a toothbrush  Older adults with arthritis sometimes have trouble brushing their teeth because they can't easily hold the toothbrush. Their hands and fingers may be stiff, painful, or weak. If this is the case, you can:  Offer an electric toothbrush.  Enlarge the handle of a non-electric toothbrush by wrapping a sponge, an elastic bandage, or adhesive tape around it.  Push the toothbrush handle through a ball made of rubber or soft foam.  Make the handle longer and thicker by taping Popsicle sticks or tongue depressors to

## 2025-04-16 ENCOUNTER — RESULTS FOLLOW-UP (OUTPATIENT)
Dept: INTERNAL MEDICINE CLINIC | Age: 89
End: 2025-04-16

## 2025-04-16 NOTE — RESULT ENCOUNTER NOTE
Slightly elevated BUN.  Please advise patient to drink generous fluid  Minor other lab changes.  No change of medicine

## 2025-05-27 ENCOUNTER — TELEPHONE (OUTPATIENT)
Dept: INTERNAL MEDICINE CLINIC | Age: 89
End: 2025-05-27

## 2025-05-27 NOTE — TELEPHONE ENCOUNTER
Patient daughter called back and stated that patient has been depressed all her life and has no plans on any suicidal ideations and no plans to harm self. Patient daughter states that patient just makes comments saying that things would be better if she was . Patient daughter states that she is watching patient very closely and if anything changes she will call the office and or go to ER.

## 2025-05-27 NOTE — TELEPHONE ENCOUNTER
SILVA Pittman MD  P [facility] Internal Medicine Practice Staff  Please call patient and discuss about depression, suicidal risk.  If any suicidal plan  or though she needs to go to emergency room.  Otherwise make sooner appointment in office

## (undated) DEVICE — SUTURE MCRYL + SZ 4-0 L27IN ABSRB UD L19MM PS-2 3/8 CIR MCP426H

## (undated) DEVICE — HYPODERMIC SAFETY NEEDLE: Brand: MAGELLAN

## (undated) DEVICE — Device

## (undated) DEVICE — TOTAL TRAY, DB, 100% SILI FOLEY, 16FR 10: Brand: MEDLINE

## (undated) DEVICE — STAPLER INT DIA5MM 25 ABSRB STRP FIX DISP FOR HERN MESH

## (undated) DEVICE — TROCAR SLEEVE: Brand: KII ® LOW PROFILE SLEEVE

## (undated) DEVICE — INDICATED FOR USE DURING OPEN AND LAPAROSCOPIC CHOLECYSTECTOMY PROCEDURES TO INJECT RADIOPAQUE MEDIA THROUGH THE CYSTIC DUCT INTO THE BILIARY TREE.: Brand: AEROSTAT®

## (undated) DEVICE — TROCAR: Brand: KII FIOS FIRST ENTRY

## (undated) DEVICE — LOOP LIG SUT SZ 0 L18IN ABSRB POLYDIOXANONE MFIL PDS II

## (undated) DEVICE — PMI DISPOSABLE PUNCTURE CLOSURE DEVICE / SUTURE GRASPER: Brand: PMI

## (undated) DEVICE — SUTURE VCRL + SZ 0 L27IN ABSRB VLT L26MM UR-6 5/8 CIR VCP603H

## (undated) DEVICE — GOWN SIRUS NONREIN LG W/TWL: Brand: MEDLINE INDUSTRIES, INC.

## (undated) DEVICE — LAPAROSCOPY PACK: Brand: MEDLINE INDUSTRIES, INC.

## (undated) DEVICE — LIQUIBAND RAPID ADHESIVE 36/CS 0.8ML: Brand: MEDLINE

## (undated) DEVICE — CORD ES L10FT MPLR LAP

## (undated) DEVICE — BLANKET WRM W40.2XL55.9IN IORT LO BODY + MISTRAL AIR

## (undated) DEVICE — ADHESIVE SKIN CLSR 0.7ML TOP DERMBND ADV

## (undated) DEVICE — SHEET,DRAPE,53X77,STERILE: Brand: MEDLINE

## (undated) DEVICE — APPLICATOR MEDICATED 26 CC SOLUTION HI LT ORNG CHLORAPREP

## (undated) DEVICE — LAPAROSCOPIC TROCAR SLEEVE/SINGLE USE: Brand: KII® LOW PROFILE OPTICAL ACCESS SYSTEM

## (undated) DEVICE — SYRINGE MED 10ML TRNSLUC BRL PLUNG BLK MRK POLYPR CTRL

## (undated) DEVICE — SOLUTION IRRIG 1000ML 0.9% SOD CHL USP POUR PLAS BTL

## (undated) DEVICE — DRAPE,LAP,CHOLE,W/TROUGHS,STERILE: Brand: MEDLINE

## (undated) DEVICE — SUTURE VCRL + SZ 0 L18IN ABSRB CLR VCP112G

## (undated) DEVICE — APPLIER CLP M/L SHFT DIA5MM 15 LIG LIGAMAX 5

## (undated) DEVICE — GLOVE SURG SZ 6.5 L11.2IN FNGR THK9.8MIL STRW LTX POLYMER

## (undated) DEVICE — BLANKET WRM W29.9XL79.1IN UP BODY FORC AIR MISTRAL-AIR

## (undated) DEVICE — BAG RETRIEVAL SPECIMEN SUPERBAG 10 MEDIUM NYLON ITRODUCER

## (undated) DEVICE — 3M™ IOBAN™ 2 ANTIMICROBIAL INCISE DRAPE 6650EZ: Brand: IOBAN™ 2

## (undated) DEVICE — SEALER LAP L37CM MARYLAND JAW OPN NANO COAT MULTIFUNCTIONAL

## (undated) DEVICE — COVER LT HNDL BLU PLAS

## (undated) DEVICE — MERCY FAIRFIELD TURNOVER KIT: Brand: MEDLINE INDUSTRIES, INC.

## (undated) DEVICE — SYRINGE MED 30ML STD CLR PLAS LUERLOCK TIP N CTRL DISP

## (undated) DEVICE — 30978 SEE SHARP - ENHANCED INTRAOPERATIVE LAPAROSCOPE CLEANING & DEFOGGING: Brand: 30978 SEE SHARP - ENHANCED INTRAOPERATIVE LAPAROSCOPE CLEANING & DEFOGGING

## (undated) DEVICE — C-ARM: Brand: UNBRANDED